# Patient Record
Sex: FEMALE | Race: WHITE | NOT HISPANIC OR LATINO | Employment: OTHER | RURAL
[De-identification: names, ages, dates, MRNs, and addresses within clinical notes are randomized per-mention and may not be internally consistent; named-entity substitution may affect disease eponyms.]

---

## 2017-01-20 ENCOUNTER — HISTORICAL (OUTPATIENT)
Dept: ADMINISTRATIVE | Facility: HOSPITAL | Age: 77
End: 2017-01-20

## 2017-01-23 LAB
LAB AP CLINICAL INFORMATION: NORMAL
LAB AP COMMENTS: NORMAL
LAB AP DIAGNOSIS - HISTORICAL: NORMAL
LAB AP GROSS PATHOLOGY - HISTORICAL: NORMAL
LAB AP SPECIMEN SUBMITTED - HISTORICAL: NORMAL

## 2018-04-06 ENCOUNTER — HISTORICAL (OUTPATIENT)
Dept: ADMINISTRATIVE | Facility: HOSPITAL | Age: 78
End: 2018-04-06

## 2018-04-10 LAB
LAB AP CLINICAL INFORMATION: NORMAL
LAB AP DIAGNOSIS - HISTORICAL: NORMAL
LAB AP GROSS PATHOLOGY - HISTORICAL: NORMAL
LAB AP SPECIMEN SUBMITTED - HISTORICAL: NORMAL

## 2019-03-25 ENCOUNTER — HISTORICAL (OUTPATIENT)
Dept: ADMINISTRATIVE | Facility: HOSPITAL | Age: 79
End: 2019-03-25

## 2019-03-27 LAB
LAB AP COMMENTS: NORMAL
LAB AP DIAGNOSIS - HISTORICAL: NORMAL
LAB AP GROSS PATHOLOGY - HISTORICAL: NORMAL
LAB AP SPECIMEN SUBMITTED - HISTORICAL: NORMAL

## 2019-04-03 ENCOUNTER — HISTORICAL (OUTPATIENT)
Dept: ADMINISTRATIVE | Facility: HOSPITAL | Age: 79
End: 2019-04-03

## 2019-04-09 LAB
LAB AP CAP CHECKLIST - HISTORICAL: NORMAL
LAB AP CLINICAL INFORMATION: NORMAL
LAB AP COMMENTS: NORMAL
LAB AP DIAGNOSIS - HISTORICAL: NORMAL
LAB AP GROSS PATHOLOGY - HISTORICAL: NORMAL
LAB AP SPECIMEN SUBMITTED - HISTORICAL: NORMAL

## 2019-07-16 ENCOUNTER — HISTORICAL (OUTPATIENT)
Dept: ADMINISTRATIVE | Facility: HOSPITAL | Age: 79
End: 2019-07-16

## 2020-03-23 ENCOUNTER — HISTORICAL (OUTPATIENT)
Dept: ADMINISTRATIVE | Facility: HOSPITAL | Age: 80
End: 2020-03-23

## 2020-03-25 LAB

## 2020-07-29 ENCOUNTER — HISTORICAL (OUTPATIENT)
Dept: ADMINISTRATIVE | Facility: HOSPITAL | Age: 80
End: 2020-07-29

## 2020-07-29 LAB
ALBUMIN SERPL BCP-MCNC: 3.8 G/DL (ref 3.5–5)
ALBUMIN/GLOB SERPL: 1 {RATIO}
ALP SERPL-CCNC: 104 U/L (ref 55–142)
ALT SERPL W P-5'-P-CCNC: 46 U/L (ref 13–56)
ANION GAP SERPL CALCULATED.3IONS-SCNC: 12 MMOL/L
APTT PPP: 29.8 SECONDS (ref 25.2–37.3)
AST SERPL W P-5'-P-CCNC: 55 U/L (ref 15–37)
BASOPHILS # BLD AUTO: 0.06 X10E3/UL (ref 0–0.2)
BASOPHILS NFR BLD AUTO: 0.5 % (ref 0–1)
BILIRUB SERPL-MCNC: 0.4 MG/DL (ref 0–1.2)
BILIRUB UR QL STRIP: NEGATIVE MG/DL
BUN SERPL-MCNC: 36 MG/DL (ref 7–18)
BUN/CREAT SERPL: 21.8
CALCIUM SERPL-MCNC: 8.9 MG/DL (ref 8.5–10.1)
CHLORIDE SERPL-SCNC: 96 MMOL/L (ref 98–107)
CK MB SERPL-MCNC: 22.6 NG/ML (ref 1–3.6)
CK SERPL-CCNC: 1158 U/L (ref 26–192)
CLARITY UR: CLEAR
CO2 SERPL-SCNC: 25 MMOL/L (ref 21–32)
COLOR UR: ABNORMAL
CREAT SERPL-MCNC: 1.65 MG/DL (ref 0.55–1.02)
EOSINOPHIL # BLD AUTO: 0.1 X10E3/UL (ref 0–0.5)
EOSINOPHIL NFR BLD AUTO: 0.8 % (ref 1–4)
ERYTHROCYTE [DISTWIDTH] IN BLOOD BY AUTOMATED COUNT: 13.3 % (ref 11.5–14.5)
GLOBULIN SER-MCNC: 3.9 G/DL (ref 2–4)
GLUCOSE SERPL-MCNC: 218 MG/DL (ref 74–106)
GLUCOSE UR STRIP-MCNC: NEGATIVE MG/DL
HCT VFR BLD AUTO: 38.7 % (ref 38–47)
HGB BLD-MCNC: 12.7 G/DL (ref 12–16)
IMM GRANULOCYTES # BLD AUTO: 0.06 X10E3/UL (ref 0–0.04)
IMM GRANULOCYTES NFR BLD: 0.5 % (ref 0–0.4)
INR BLD: 0.99 (ref 0–3.3)
KETONES UR STRIP-SCNC: NEGATIVE MG/DL
LEUKOCYTE ESTERASE UR QL STRIP: NEGATIVE LEU/UL
LYMPHOCYTES # BLD AUTO: 2.39 X10E3/UL (ref 1–4.8)
LYMPHOCYTES NFR BLD AUTO: 19.6 % (ref 27–41)
MAGNESIUM SERPL-MCNC: 2.2 MG/DL (ref 1.7–2.3)
MCH RBC QN AUTO: 30.1 PG (ref 27–31)
MCHC RBC AUTO-ENTMCNC: 32.8 G/DL (ref 32–36)
MCV RBC AUTO: 91.7 FL (ref 80–96)
MONOCYTES # BLD AUTO: 0.94 X10E3/UL (ref 0–0.8)
MONOCYTES NFR BLD AUTO: 7.7 % (ref 2–6)
MPC BLD CALC-MCNC: 10.2 FL (ref 9.4–12.4)
MYOGLOBIN SERPL-MCNC: 500 NG/ML (ref 13–71)
NEUTROPHILS # BLD AUTO: 8.65 X10E3/UL (ref 1.8–7.7)
NEUTROPHILS NFR BLD AUTO: 70.9 % (ref 53–65)
NITRITE UR QL STRIP: NEGATIVE
NRBC # BLD AUTO: 0 X10E3/UL (ref 0–0)
NRBC, AUTO (.00): 0 /100 (ref 0–0)
PH UR STRIP: 6 PH UNITS (ref 5–8)
PLATELET # BLD AUTO: 288 X10E3/UL (ref 150–400)
POTASSIUM SERPL-SCNC: 4.4 MMOL/L (ref 3.5–5.1)
PROT SERPL-MCNC: 7.7 G/DL (ref 6.4–8.2)
PROT UR QL STRIP: NEGATIVE MG/DL
PROTHROMBIN TIME: 12.6 SECONDS (ref 11.7–14.7)
RBC # BLD AUTO: 4.22 X10E6/UL (ref 4.2–5.4)
RBC # UR STRIP: NEGATIVE ERY/UL
SODIUM SERPL-SCNC: 129 MMOL/L (ref 136–145)
SP GR UR STRIP: 1.01 (ref 1–1.03)
TROPONIN I SERPL-MCNC: <0.017 NG/ML (ref 0–0.06)
UROBILINOGEN UR STRIP-ACNC: 0.2 EU/DL
WBC # BLD AUTO: 12.2 X10E3/UL (ref 4.5–11)

## 2020-08-11 ENCOUNTER — HISTORICAL (OUTPATIENT)
Dept: ADMINISTRATIVE | Facility: HOSPITAL | Age: 80
End: 2020-08-11

## 2020-08-11 LAB
ANION GAP SERPL CALCULATED.3IONS-SCNC: 12 MMOL/L (ref 7–16)
BUN SERPL-MCNC: 26 MG/DL (ref 7–18)
CALCIUM SERPL-MCNC: 9 MG/DL (ref 8.5–10.1)
CHLORIDE SERPL-SCNC: 97 MMOL/L (ref 98–107)
CO2 SERPL-SCNC: 27 MMOL/L (ref 21–32)
CREAT SERPL-MCNC: 1.2 MG/DL (ref 0.5–1.02)
EST. AVERAGE GLUCOSE BLD GHB EST-MCNC: 204 MG/DL
GLUCOSE SERPL-MCNC: 298 MG/DL (ref 74–106)
HBA1C MFR BLD HPLC: 8.7 %
POTASSIUM SERPL-SCNC: 4.3 MMOL/L (ref 3.5–5.1)
SODIUM SERPL-SCNC: 132 MMOL/L (ref 136–145)

## 2020-09-04 ENCOUNTER — HISTORICAL (OUTPATIENT)
Dept: ADMINISTRATIVE | Facility: HOSPITAL | Age: 80
End: 2020-09-04

## 2020-10-07 ENCOUNTER — HISTORICAL (OUTPATIENT)
Dept: ADMINISTRATIVE | Facility: HOSPITAL | Age: 80
End: 2020-10-07

## 2020-10-07 LAB — GLUCOSE SERPL-MCNC: 205 MG/DL (ref 70–105)

## 2020-10-13 ENCOUNTER — HISTORICAL (OUTPATIENT)
Dept: ADMINISTRATIVE | Facility: HOSPITAL | Age: 80
End: 2020-10-13

## 2020-10-13 LAB — SARS-COV+SARS-COV-2 AG RESP QL IA.RAPID: NEGATIVE

## 2020-12-11 ENCOUNTER — HISTORICAL (OUTPATIENT)
Dept: ADMINISTRATIVE | Facility: HOSPITAL | Age: 80
End: 2020-12-11

## 2020-12-14 LAB
INSULIN SERPL-ACNC: NORMAL U[IU]/ML
LAB AP CLINICAL INFORMATION: NORMAL
LAB AP COMMENTS: NORMAL
LAB AP DIAGNOSIS - HISTORICAL: NORMAL
LAB AP GROSS PATHOLOGY - HISTORICAL: NORMAL
LAB AP SPECIMEN SUBMITTED - HISTORICAL: NORMAL

## 2021-01-26 ENCOUNTER — HISTORICAL (OUTPATIENT)
Dept: ADMINISTRATIVE | Facility: HOSPITAL | Age: 81
End: 2021-01-26

## 2021-02-18 ENCOUNTER — HISTORICAL (OUTPATIENT)
Dept: ADMINISTRATIVE | Facility: HOSPITAL | Age: 81
End: 2021-02-18

## 2021-02-19 LAB

## 2021-03-15 ENCOUNTER — OFFICE VISIT (OUTPATIENT)
Dept: FAMILY MEDICINE | Facility: CLINIC | Age: 81
End: 2021-03-15
Payer: COMMERCIAL

## 2021-03-15 DIAGNOSIS — Z79.4 TYPE 2 DIABETES MELLITUS WITH HYPERGLYCEMIA, WITH LONG-TERM CURRENT USE OF INSULIN: ICD-10-CM

## 2021-03-15 DIAGNOSIS — Z79.899 ENCOUNTER FOR LONG-TERM (CURRENT) USE OF OTHER MEDICATIONS: Primary | ICD-10-CM

## 2021-03-15 DIAGNOSIS — E11.65 TYPE 2 DIABETES MELLITUS WITH HYPERGLYCEMIA, WITH LONG-TERM CURRENT USE OF INSULIN: ICD-10-CM

## 2021-03-15 PROCEDURE — 83036 HEMOGLOBIN GLYCOSYLATED A1C: CPT

## 2021-03-16 LAB
ALBUMIN SERPL BCP-MCNC: 3.8 G/DL (ref 3.5–5)
ALBUMIN/GLOB SERPL: 1.1 {RATIO}
ALP SERPL-CCNC: 111 U/L (ref 55–142)
ALT SERPL W P-5'-P-CCNC: 15 U/L (ref 13–56)
ANION GAP SERPL CALCULATED.3IONS-SCNC: 14.9 MMOL/L (ref 7–16)
AST SERPL W P-5'-P-CCNC: 12 U/L (ref 15–37)
BASOPHILS # BLD AUTO: 0.09 X10E3/UL (ref 0–0.2)
BASOPHILS NFR BLD AUTO: 0.9 % (ref 0–1)
BILIRUB SERPL-MCNC: 0.4 MG/DL (ref 0–1.2)
BUN SERPL-MCNC: 17 MG/DL (ref 7–18)
BUN/CREAT SERPL: 15
CALCIUM SERPL-MCNC: 9.3 MG/DL (ref 8.5–10.1)
CHLORIDE SERPL-SCNC: 99 MMOL/L (ref 98–107)
CHOLEST SERPL-MCNC: 170 MG/DL
CO2 SERPL-SCNC: 25 MMOL/L (ref 21–32)
CREAT SERPL-MCNC: 1.15 MG/DL (ref 0.5–1.02)
EOSINOPHIL # BLD AUTO: 0.36 X10E3/UL (ref 0–0.5)
EOSINOPHIL NFR BLD AUTO: 3.4 % (ref 1–4)
ERYTHROCYTE [DISTWIDTH] IN BLOOD BY AUTOMATED COUNT: 12.9 % (ref 11.5–14.5)
EST. AVERAGE GLUCOSE BLD GHB EST-MCNC: 184 MG/DL
GLOBULIN SER-MCNC: 3.6 G/DL (ref 2–4)
GLUCOSE SERPL-MCNC: 215 MG/DL (ref 74–106)
HBA1C MFR BLD HPLC: 8.1 %
HCT VFR BLD AUTO: 42.3 % (ref 38–47)
HDLC SERPL-MCNC: 37 MG/DL
HGB BLD-MCNC: 13.3 G/DL (ref 12–16)
IMM GRANULOCYTES # BLD AUTO: 0.03 X10E3/UL (ref 0–0.04)
IMM GRANULOCYTES NFR BLD: 0.3 % (ref 0–0.4)
LDLC SERPL CALC-MCNC: 69 MG/DL
LYMPHOCYTES # BLD AUTO: 2.69 X10E3/UL (ref 1–4.8)
LYMPHOCYTES NFR BLD AUTO: 25.7 % (ref 27–41)
MCH RBC QN AUTO: 29.6 PG (ref 27–31)
MCHC RBC AUTO-ENTMCNC: 31.4 G/DL (ref 32–36)
MCV RBC AUTO: 94 FL (ref 80–96)
MONOCYTES # BLD AUTO: 0.63 X10E3/UL (ref 0–0.8)
MONOCYTES NFR BLD AUTO: 6 % (ref 2–6)
MPC BLD CALC-MCNC: 11.4 FL (ref 9.4–12.4)
NEUTROPHILS # BLD AUTO: 6.68 X10E3/UL (ref 1.8–7.7)
NEUTROPHILS NFR BLD AUTO: 63.7 % (ref 53–65)
NONHDLC SERPL-MCNC: 133 MG/DL
NRBC # BLD AUTO: 0 X10E3/UL (ref 0–0)
NRBC, AUTO (.00): 0 /100 (ref 0–0)
PLATELET # BLD AUTO: 288 X10E3/UL (ref 150–400)
POTASSIUM SERPL-SCNC: 4.9 MMOL/L (ref 3.5–5.1)
PROT SERPL-MCNC: 7.4 G/DL (ref 6.4–8.2)
RBC # BLD AUTO: 4.5 X10E6/UL (ref 4.2–5.4)
SODIUM SERPL-SCNC: 134 MMOL/L (ref 136–145)
TRIGL SERPL-MCNC: 321 MG/DL
VLDLC SERPL-MCNC: 64 MG/DL
WBC # BLD AUTO: 10.48 X10E3/UL (ref 4.5–11)

## 2021-03-24 ENCOUNTER — TELEPHONE (OUTPATIENT)
Dept: FAMILY MEDICINE | Facility: CLINIC | Age: 81
End: 2021-03-24

## 2021-04-20 ENCOUNTER — TELEPHONE (OUTPATIENT)
Dept: FAMILY MEDICINE | Facility: CLINIC | Age: 81
End: 2021-04-20

## 2021-04-20 RX ORDER — GLIPIZIDE 5 MG/1
5 TABLET ORAL
COMMUNITY
End: 2021-08-03 | Stop reason: ALTCHOICE

## 2021-04-20 RX ORDER — LISINOPRIL 5 MG/1
5 TABLET ORAL DAILY
COMMUNITY
End: 2021-08-03 | Stop reason: SDUPTHER

## 2021-04-20 RX ORDER — GABAPENTIN 100 MG/1
100 CAPSULE ORAL 3 TIMES DAILY
COMMUNITY
End: 2021-07-21 | Stop reason: SDUPTHER

## 2021-04-20 RX ORDER — NITROGLYCERIN 0.4 MG/1
0.4 TABLET SUBLINGUAL EVERY 5 MIN PRN
COMMUNITY
End: 2022-03-31 | Stop reason: SDUPTHER

## 2021-04-20 RX ORDER — ASCORBIC ACID 500 MG
500 TABLET ORAL DAILY
COMMUNITY

## 2021-04-20 RX ORDER — ESOMEPRAZOLE MAGNESIUM 40 MG/1
40 CAPSULE, DELAYED RELEASE ORAL
COMMUNITY
End: 2021-07-13

## 2021-04-20 RX ORDER — MONTELUKAST SODIUM 10 MG/1
10 TABLET ORAL NIGHTLY
COMMUNITY
End: 2021-08-03 | Stop reason: SDUPTHER

## 2021-04-20 RX ORDER — ASPIRIN 81 MG/1
81 TABLET ORAL DAILY
COMMUNITY
End: 2022-08-03

## 2021-04-20 RX ORDER — VIT C/E/ZN/COPPR/LUTEIN/ZEAXAN 250MG-90MG
2000 CAPSULE ORAL 2 TIMES DAILY
COMMUNITY

## 2021-04-20 RX ORDER — ROSUVASTATIN CALCIUM 20 MG/1
20 TABLET, COATED ORAL DAILY
COMMUNITY
End: 2021-08-03 | Stop reason: SDUPTHER

## 2021-04-20 RX ORDER — SPIRONOLACTONE 25 MG/1
25 TABLET ORAL DAILY
COMMUNITY
End: 2021-08-03 | Stop reason: SDUPTHER

## 2021-04-20 RX ORDER — CARVEDILOL 3.12 MG/1
3.12 TABLET ORAL 2 TIMES DAILY WITH MEALS
COMMUNITY
End: 2021-08-03 | Stop reason: SDUPTHER

## 2021-04-20 RX ORDER — ONDANSETRON 4 MG/1
4 TABLET, FILM COATED ORAL EVERY 8 HOURS PRN
COMMUNITY
End: 2021-09-20

## 2021-04-20 RX ORDER — FUROSEMIDE 40 MG/1
40 TABLET ORAL DAILY
COMMUNITY
End: 2021-08-03 | Stop reason: SDUPTHER

## 2021-04-20 RX ORDER — SERTRALINE HYDROCHLORIDE 50 MG/1
50 TABLET, FILM COATED ORAL DAILY
COMMUNITY
End: 2021-06-01

## 2021-04-20 RX ORDER — LEVOTHYROXINE SODIUM 25 UG/1
25 TABLET ORAL
COMMUNITY
End: 2022-06-27 | Stop reason: SDUPTHER

## 2021-04-21 ENCOUNTER — OFFICE VISIT (OUTPATIENT)
Dept: PAIN MEDICINE | Facility: CLINIC | Age: 81
End: 2021-04-21
Attending: PAIN MEDICINE
Payer: COMMERCIAL

## 2021-04-21 VITALS
HEART RATE: 98 BPM | BODY MASS INDEX: 28.53 KG/M2 | SYSTOLIC BLOOD PRESSURE: 146 MMHG | DIASTOLIC BLOOD PRESSURE: 80 MMHG | HEIGHT: 63 IN | WEIGHT: 161 LBS

## 2021-04-21 DIAGNOSIS — M54.12 CERVICAL RADICULOPATHY: ICD-10-CM

## 2021-04-21 DIAGNOSIS — Z79.899 ENCOUNTER FOR LONG-TERM (CURRENT) USE OF OTHER MEDICATIONS: Primary | ICD-10-CM

## 2021-04-21 DIAGNOSIS — M47.812 CERVICAL SPONDYLOSIS: ICD-10-CM

## 2021-04-21 DIAGNOSIS — G89.4 CHRONIC PAIN DISORDER: ICD-10-CM

## 2021-04-21 LAB
CTP QC/QA: YES
POC (AMP) AMPHETAMINE: NEGATIVE
POC (BAR) BARBITURATES: NEGATIVE
POC (BUP) BUPRENORPHINE: NEGATIVE
POC (BZO) BENZODIAZEPINES: ABNORMAL
POC (COC) COCAINE: NEGATIVE
POC (MDMA) METHYLENEDIOXYMETHAMPHETAMINE 3,4: NEGATIVE
POC (MET) METHAMPHETAMINE: NEGATIVE
POC (MOP) OPIATES: ABNORMAL
POC (MTD) METHADONE: NEGATIVE
POC (OXY) OXYCODONE: NEGATIVE
POC (PCP) PHENCYCLIDINE: NEGATIVE
POC (TCA) TRICYCLIC ANTIDEPRESSANTS: NEGATIVE
POC TEMPERATURE (URINE): 94
POC THC: NEGATIVE

## 2021-04-21 PROCEDURE — G0481 DRUG SCREEN DEFINITIVE 14, URINE: ICD-10-PCS | Mod: ,,, | Performed by: CLINICAL MEDICAL LABORATORY

## 2021-04-21 PROCEDURE — 99213 OFFICE O/P EST LOW 20 MIN: CPT | Mod: S$PBB,,, | Performed by: PAIN MEDICINE

## 2021-04-21 PROCEDURE — 99215 OFFICE O/P EST HI 40 MIN: CPT | Mod: PBBFAC | Performed by: PAIN MEDICINE

## 2021-04-21 PROCEDURE — 99213 PR OFFICE/OUTPT VISIT, EST, LEVL III, 20-29 MIN: ICD-10-PCS | Mod: S$PBB,,, | Performed by: PAIN MEDICINE

## 2021-04-21 PROCEDURE — 99999 PR PBB SHADOW E&M-EST. PATIENT-LVL V: ICD-10-PCS | Mod: PBBFAC,,, | Performed by: PAIN MEDICINE

## 2021-04-21 PROCEDURE — 99999 PR PBB SHADOW E&M-EST. PATIENT-LVL V: CPT | Mod: PBBFAC,,, | Performed by: PAIN MEDICINE

## 2021-04-21 PROCEDURE — 80305 DRUG TEST PRSMV DIR OPT OBS: CPT | Mod: PBBFAC | Performed by: PAIN MEDICINE

## 2021-04-21 PROCEDURE — G0481 DRUG TEST DEF 8-14 CLASSES: HCPCS | Mod: ,,, | Performed by: CLINICAL MEDICAL LABORATORY

## 2021-04-21 RX ORDER — HYDROCODONE BITARTRATE AND ACETAMINOPHEN 5; 325 MG/1; MG/1
1 TABLET ORAL 3 TIMES DAILY
COMMUNITY
End: 2021-12-15

## 2021-04-21 RX ORDER — HYDROCODONE BITARTRATE AND ACETAMINOPHEN 5; 325 MG/1; MG/1
1 TABLET ORAL EVERY 8 HOURS PRN
Qty: 90 TABLET | Refills: 0 | Status: SHIPPED | OUTPATIENT
Start: 2021-04-23 | End: 2021-05-23

## 2021-04-21 RX ORDER — HYDROCODONE BITARTRATE AND ACETAMINOPHEN 5; 325 MG/1; MG/1
1 TABLET ORAL EVERY 8 HOURS PRN
Qty: 90 TABLET | Refills: 0 | Status: SHIPPED | OUTPATIENT
Start: 2021-05-23 | End: 2021-06-22

## 2021-04-21 RX ORDER — HYDROCODONE BITARTRATE AND ACETAMINOPHEN 5; 325 MG/1; MG/1
1 TABLET ORAL EVERY 8 HOURS PRN
Qty: 90 TABLET | Refills: 0 | Status: SHIPPED | OUTPATIENT
Start: 2021-06-22 | End: 2021-07-22

## 2021-04-23 LAB
6-ACETYLMORPHINE, URINE (RUSH): NEGATIVE 10 NG/ML
7-AMINOCLONAZEPAM, URINE (RUSH): NEGATIVE 25 NG/ML
A-HYDROXYALPRAZOLAM, URINE (RUSH): NEGATIVE 25 NG/ML
ACETYL FENTANYL, URINE (RUSH): NEGATIVE 2.5 NG/ML
ACETYL NORFENTANYL OXALATE, URINE (RUSH): NEGATIVE 5 NG/ML
AMPHET UR QL SCN: NEGATIVE
BENZOYLECGONINE, URINE (RUSH): NEGATIVE 100 NG/ML
BUPRENORPHINE UR QL SCN: NEGATIVE 25 NG/ML
CODEINE, URINE (RUSH): 302.2 25 NG/ML
CREAT UR-MCNC: 78 MG/DL (ref 28–219)
EDDP, URINE (RUSH): NEGATIVE 25 NG/ML
FENTANYL, URINE (RUSH): NEGATIVE 2.5 NG/ML
HYDROCODONE, URINE (RUSH): >250 25 NG/ML
HYDROMORPHONE, URINE (RUSH): >250 25 NG/ML
LORAZEPAM, URINE (RUSH): NEGATIVE 25 NG/ML
METHADONE UR QL SCN: NEGATIVE
METHAMPHET UR QL SCN: NEGATIVE
MORPHINE, URINE (RUSH): 30.2 25 NG/ML
NORBUPRENORPHINE, URINE (RUSH): NEGATIVE 25 NG/ML
NORDIAZEPAM, URINE (RUSH): NEGATIVE 25 NG/ML
NORFENTANYL OXALATE, URINE (RUSH): NEGATIVE 5 NG/ML
NORHYDROCODONE, URINE (RUSH): >500 50 NG/ML
NOROXYCODONE HCL, URINE (RUSH): NEGATIVE 50 NG/ML
OXAZEPAM, URINE (RUSH): NEGATIVE 25 NG/ML
OXYCODONE UR QL SCN: NEGATIVE
OXYMORPHONE, URINE (RUSH): NEGATIVE 25 NG/ML
PH UR STRIP: 6 PH UNITS
SP GR UR STRIP: 1.01
TAPENTADOL, URINE (RUSH): NEGATIVE 25 NG/ML
TEMAZEPAM, URINE (RUSH): NEGATIVE 25 NG/ML
THC-COOH, URINE (RUSH): NEGATIVE 25 NG/ML
TRAMADOL, URINE (RUSH): NEGATIVE 100 NG/ML

## 2021-05-20 ENCOUNTER — TELEPHONE (OUTPATIENT)
Dept: FAMILY MEDICINE | Facility: CLINIC | Age: 81
End: 2021-05-20

## 2021-07-13 RX ORDER — ESOMEPRAZOLE MAGNESIUM 40 MG/1
40 CAPSULE, DELAYED RELEASE ORAL
OUTPATIENT
Start: 2021-07-13

## 2021-07-15 RX ORDER — METHOCARBAMOL 500 MG/1
500 TABLET, FILM COATED ORAL 2 TIMES DAILY
Qty: 40 TABLET | Refills: 0 | Status: SHIPPED | OUTPATIENT
Start: 2021-07-15 | End: 2021-08-03 | Stop reason: SDUPTHER

## 2021-07-21 ENCOUNTER — OFFICE VISIT (OUTPATIENT)
Dept: PAIN MEDICINE | Facility: CLINIC | Age: 81
End: 2021-07-21
Payer: COMMERCIAL

## 2021-07-21 VITALS
DIASTOLIC BLOOD PRESSURE: 84 MMHG | BODY MASS INDEX: 28.53 KG/M2 | WEIGHT: 161 LBS | SYSTOLIC BLOOD PRESSURE: 137 MMHG | HEART RATE: 103 BPM | HEIGHT: 63 IN

## 2021-07-21 DIAGNOSIS — G89.4 CHRONIC PAIN DISORDER: Chronic | ICD-10-CM

## 2021-07-21 DIAGNOSIS — M47.812 CERVICAL SPONDYLOSIS: Primary | Chronic | ICD-10-CM

## 2021-07-21 DIAGNOSIS — Z79.899 OTHER LONG TERM (CURRENT) DRUG THERAPY: ICD-10-CM

## 2021-07-21 LAB
CTP QC/QA: YES
POC (AMP) AMPHETAMINE: NEGATIVE
POC (BAR) BARBITURATES: NEGATIVE
POC (BUP) BUPRENORPHINE: NEGATIVE
POC (BZO) BENZODIAZEPINES: NEGATIVE
POC (COC) COCAINE: NEGATIVE
POC (MDMA) METHYLENEDIOXYMETHAMPHETAMINE 3,4: NEGATIVE
POC (MET) METHAMPHETAMINE: NEGATIVE
POC (MOP) OPIATES: ABNORMAL
POC (MTD) METHADONE: NEGATIVE
POC (OXY) OXYCODONE: NEGATIVE
POC (PCP) PHENCYCLIDINE: NEGATIVE
POC (TCA) TRICYCLIC ANTIDEPRESSANTS: NEGATIVE
POC TEMPERATURE (URINE): 94
POC THC: NEGATIVE

## 2021-07-21 PROCEDURE — 99214 OFFICE O/P EST MOD 30 MIN: CPT | Mod: S$PBB,,, | Performed by: PAIN MEDICINE

## 2021-07-21 PROCEDURE — 99215 OFFICE O/P EST HI 40 MIN: CPT | Mod: PBBFAC | Performed by: PAIN MEDICINE

## 2021-07-21 PROCEDURE — 99214 PR OFFICE/OUTPT VISIT, EST, LEVL IV, 30-39 MIN: ICD-10-PCS | Mod: S$PBB,,, | Performed by: PAIN MEDICINE

## 2021-07-21 PROCEDURE — 80305 DRUG TEST PRSMV DIR OPT OBS: CPT | Mod: PBBFAC | Performed by: PAIN MEDICINE

## 2021-07-21 RX ORDER — HYDROCODONE BITARTRATE AND ACETAMINOPHEN 5; 325 MG/1; MG/1
1 TABLET ORAL EVERY 8 HOURS PRN
Qty: 90 TABLET | Refills: 0 | Status: SHIPPED | OUTPATIENT
Start: 2021-09-20 | End: 2021-10-20

## 2021-07-21 RX ORDER — HYDROCODONE BITARTRATE AND ACETAMINOPHEN 5; 325 MG/1; MG/1
1 TABLET ORAL EVERY 8 HOURS PRN
Qty: 90 TABLET | Refills: 0 | Status: SHIPPED | OUTPATIENT
Start: 2021-08-21 | End: 2021-09-20

## 2021-07-21 RX ORDER — GABAPENTIN 100 MG/1
100 CAPSULE ORAL 3 TIMES DAILY
Qty: 270 CAPSULE | Refills: 0 | Status: SHIPPED | OUTPATIENT
Start: 2021-07-21 | End: 2021-10-19

## 2021-07-21 RX ORDER — HYDROCODONE BITARTRATE AND ACETAMINOPHEN 5; 325 MG/1; MG/1
1 TABLET ORAL EVERY 8 HOURS PRN
Qty: 90 TABLET | Refills: 0 | Status: SHIPPED | OUTPATIENT
Start: 2021-07-22 | End: 2021-08-21

## 2021-08-03 ENCOUNTER — OFFICE VISIT (OUTPATIENT)
Dept: FAMILY MEDICINE | Facility: CLINIC | Age: 81
End: 2021-08-03
Payer: COMMERCIAL

## 2021-08-03 ENCOUNTER — DOCUMENTATION ONLY (OUTPATIENT)
Dept: PAIN MEDICINE | Facility: CLINIC | Age: 81
End: 2021-08-03

## 2021-08-03 VITALS
HEIGHT: 63 IN | TEMPERATURE: 98 F | HEART RATE: 80 BPM | RESPIRATION RATE: 20 BRPM | BODY MASS INDEX: 28.35 KG/M2 | DIASTOLIC BLOOD PRESSURE: 66 MMHG | OXYGEN SATURATION: 96 % | SYSTOLIC BLOOD PRESSURE: 106 MMHG | WEIGHT: 160 LBS

## 2021-08-03 DIAGNOSIS — E03.9 HYPOTHYROIDISM, UNSPECIFIED TYPE: ICD-10-CM

## 2021-08-03 DIAGNOSIS — F32.A DEPRESSION, UNSPECIFIED DEPRESSION TYPE: ICD-10-CM

## 2021-08-03 DIAGNOSIS — E11.9 CONTROLLED TYPE 2 DIABETES MELLITUS WITHOUT COMPLICATION, WITHOUT LONG-TERM CURRENT USE OF INSULIN: Primary | ICD-10-CM

## 2021-08-03 DIAGNOSIS — I25.10 CORONARY ARTERY DISEASE, ANGINA PRESENCE UNSPECIFIED, UNSPECIFIED VESSEL OR LESION TYPE, UNSPECIFIED WHETHER NATIVE OR TRANSPLANTED HEART: ICD-10-CM

## 2021-08-03 DIAGNOSIS — J44.9 CHRONIC OBSTRUCTIVE PULMONARY DISEASE, UNSPECIFIED COPD TYPE: ICD-10-CM

## 2021-08-03 DIAGNOSIS — E78.5 DYSLIPIDEMIA: ICD-10-CM

## 2021-08-03 LAB
ALBUMIN SERPL BCP-MCNC: 3.8 G/DL (ref 3.5–5)
ALBUMIN/GLOB SERPL: 1 {RATIO}
ALP SERPL-CCNC: 99 U/L (ref 55–142)
ALT SERPL W P-5'-P-CCNC: 15 U/L (ref 13–56)
ANION GAP SERPL CALCULATED.3IONS-SCNC: 8 MMOL/L (ref 7–16)
AST SERPL W P-5'-P-CCNC: 10 U/L (ref 15–37)
BASOPHILS # BLD AUTO: 0.09 K/UL (ref 0–0.2)
BASOPHILS NFR BLD AUTO: 1 % (ref 0–1)
BILIRUB SERPL-MCNC: 0.4 MG/DL (ref 0–1.2)
BUN SERPL-MCNC: 18 MG/DL (ref 7–18)
BUN/CREAT SERPL: 16 (ref 6–20)
CALCIUM SERPL-MCNC: 9.5 MG/DL (ref 8.5–10.1)
CHLORIDE SERPL-SCNC: 102 MMOL/L (ref 98–107)
CHOLEST SERPL-MCNC: 168 MG/DL (ref 0–200)
CHOLEST/HDLC SERPL: 4.7 {RATIO}
CO2 SERPL-SCNC: 28 MMOL/L (ref 21–32)
CREAT SERPL-MCNC: 1.14 MG/DL (ref 0.55–1.02)
DIFFERENTIAL METHOD BLD: ABNORMAL
EOSINOPHIL # BLD AUTO: 0.44 K/UL (ref 0–0.5)
EOSINOPHIL NFR BLD AUTO: 5 % (ref 1–4)
ERYTHROCYTE [DISTWIDTH] IN BLOOD BY AUTOMATED COUNT: 13 % (ref 11.5–14.5)
EST. AVERAGE GLUCOSE BLD GHB EST-MCNC: 174 MG/DL
GLOBULIN SER-MCNC: 3.9 G/DL (ref 2–4)
GLUCOSE SERPL-MCNC: 152 MG/DL (ref 74–106)
HBA1C MFR BLD HPLC: 7.8 % (ref 4.5–6.6)
HCT VFR BLD AUTO: 39.2 % (ref 38–47)
HDLC SERPL-MCNC: 36 MG/DL (ref 40–60)
HGB BLD-MCNC: 12.9 G/DL (ref 12–16)
IMM GRANULOCYTES # BLD AUTO: 0.03 K/UL (ref 0–0.04)
IMM GRANULOCYTES NFR BLD: 0.3 % (ref 0–0.4)
LDLC SERPL CALC-MCNC: 74 MG/DL
LDLC/HDLC SERPL: 2.1 {RATIO}
LYMPHOCYTES # BLD AUTO: 2.25 K/UL (ref 1–4.8)
LYMPHOCYTES NFR BLD AUTO: 25.3 % (ref 27–41)
MCH RBC QN AUTO: 30.1 PG (ref 27–31)
MCHC RBC AUTO-ENTMCNC: 32.9 G/DL (ref 32–36)
MCV RBC AUTO: 91.6 FL (ref 80–96)
MONOCYTES # BLD AUTO: 0.55 K/UL (ref 0–0.8)
MONOCYTES NFR BLD AUTO: 6.2 % (ref 2–6)
MPC BLD CALC-MCNC: 11.1 FL (ref 9.4–12.4)
NEUTROPHILS # BLD AUTO: 5.52 K/UL (ref 1.8–7.7)
NEUTROPHILS NFR BLD AUTO: 62.2 % (ref 53–65)
NONHDLC SERPL-MCNC: 132 MG/DL
NRBC # BLD AUTO: 0 X10E3/UL
NRBC, AUTO (.00): 0 %
PLATELET # BLD AUTO: 259 K/UL (ref 150–400)
POTASSIUM SERPL-SCNC: 4 MMOL/L (ref 3.5–5.1)
PROT SERPL-MCNC: 7.7 G/DL (ref 6.4–8.2)
RBC # BLD AUTO: 4.28 M/UL (ref 4.2–5.4)
SODIUM SERPL-SCNC: 134 MMOL/L (ref 136–145)
TRIGL SERPL-MCNC: 291 MG/DL (ref 35–150)
TSH SERPL DL<=0.005 MIU/L-ACNC: 3.13 UIU/ML (ref 0.36–3.74)
VLDLC SERPL-MCNC: 58 MG/DL
WBC # BLD AUTO: 8.88 K/UL (ref 4.5–11)

## 2021-08-03 PROCEDURE — 80061 LIPID PANEL: CPT | Mod: QW,,, | Performed by: CLINICAL MEDICAL LABORATORY

## 2021-08-03 PROCEDURE — 80061 LIPID PANEL: ICD-10-PCS | Mod: QW,,, | Performed by: CLINICAL MEDICAL LABORATORY

## 2021-08-03 PROCEDURE — 85025 CBC WITH DIFFERENTIAL: ICD-10-PCS | Mod: QW,,, | Performed by: CLINICAL MEDICAL LABORATORY

## 2021-08-03 PROCEDURE — 84443 ASSAY THYROID STIM HORMONE: CPT | Mod: QW,,, | Performed by: CLINICAL MEDICAL LABORATORY

## 2021-08-03 PROCEDURE — 99214 OFFICE O/P EST MOD 30 MIN: CPT | Mod: ,,, | Performed by: NURSE PRACTITIONER

## 2021-08-03 PROCEDURE — 80053 COMPREHENSIVE METABOLIC PANEL: ICD-10-PCS | Mod: QW,,, | Performed by: CLINICAL MEDICAL LABORATORY

## 2021-08-03 PROCEDURE — 80053 COMPREHEN METABOLIC PANEL: CPT | Mod: QW,,, | Performed by: CLINICAL MEDICAL LABORATORY

## 2021-08-03 PROCEDURE — 83036 HEMOGLOBIN GLYCOSYLATED A1C: CPT | Mod: QW,,, | Performed by: CLINICAL MEDICAL LABORATORY

## 2021-08-03 PROCEDURE — 84443 TSH: ICD-10-PCS | Mod: QW,,, | Performed by: CLINICAL MEDICAL LABORATORY

## 2021-08-03 PROCEDURE — 99214 PR OFFICE/OUTPT VISIT, EST, LEVL IV, 30-39 MIN: ICD-10-PCS | Mod: ,,, | Performed by: NURSE PRACTITIONER

## 2021-08-03 PROCEDURE — 83036 HEMOGLOBIN A1C: ICD-10-PCS | Mod: QW,,, | Performed by: CLINICAL MEDICAL LABORATORY

## 2021-08-03 PROCEDURE — 85025 COMPLETE CBC W/AUTO DIFF WBC: CPT | Mod: QW,,, | Performed by: CLINICAL MEDICAL LABORATORY

## 2021-08-03 RX ORDER — FUROSEMIDE 40 MG/1
40 TABLET ORAL DAILY
Qty: 90 TABLET | Refills: 1 | Status: SHIPPED | OUTPATIENT
Start: 2021-08-03 | End: 2021-09-20 | Stop reason: SDUPTHER

## 2021-08-03 RX ORDER — AMITRIPTYLINE HYDROCHLORIDE 25 MG/1
25 TABLET, FILM COATED ORAL NIGHTLY
Qty: 90 TABLET | Refills: 1 | Status: SHIPPED | OUTPATIENT
Start: 2021-08-03 | End: 2021-08-03

## 2021-08-03 RX ORDER — MONTELUKAST SODIUM 10 MG/1
10 TABLET ORAL NIGHTLY
Qty: 90 TABLET | Refills: 1 | Status: SHIPPED | OUTPATIENT
Start: 2021-08-03 | End: 2021-09-20 | Stop reason: SDUPTHER

## 2021-08-03 RX ORDER — METHOCARBAMOL 500 MG/1
500 TABLET, FILM COATED ORAL 2 TIMES DAILY
Qty: 40 TABLET | Refills: 2 | Status: SHIPPED | OUTPATIENT
Start: 2021-08-03 | End: 2021-09-20 | Stop reason: SDUPTHER

## 2021-08-03 RX ORDER — ROSUVASTATIN CALCIUM 20 MG/1
20 TABLET, COATED ORAL DAILY
Qty: 90 TABLET | Refills: 1 | Status: SHIPPED | OUTPATIENT
Start: 2021-08-03 | End: 2021-09-20 | Stop reason: SDUPTHER

## 2021-08-03 RX ORDER — CARVEDILOL 3.12 MG/1
3.12 TABLET ORAL 2 TIMES DAILY WITH MEALS
Qty: 90 TABLET | Refills: 1 | Status: SHIPPED | OUTPATIENT
Start: 2021-08-03 | End: 2021-09-20 | Stop reason: SDUPTHER

## 2021-08-03 RX ORDER — SPIRONOLACTONE 25 MG/1
25 TABLET ORAL DAILY
Qty: 90 TABLET | Refills: 1 | Status: SHIPPED | OUTPATIENT
Start: 2021-08-03 | End: 2021-09-20 | Stop reason: SDUPTHER

## 2021-08-03 RX ORDER — LISINOPRIL 5 MG/1
5 TABLET ORAL DAILY
Qty: 90 TABLET | Refills: 1 | Status: SHIPPED | OUTPATIENT
Start: 2021-08-03 | End: 2021-09-20 | Stop reason: SDUPTHER

## 2021-08-03 RX ORDER — SERTRALINE HYDROCHLORIDE 50 MG/1
50 TABLET, FILM COATED ORAL DAILY
Qty: 90 TABLET | Refills: 1 | Status: SHIPPED | OUTPATIENT
Start: 2021-08-03 | End: 2021-09-20 | Stop reason: SDUPTHER

## 2021-08-05 ENCOUNTER — HOSPITAL ENCOUNTER (OUTPATIENT)
Facility: HOSPITAL | Age: 81
Discharge: HOME | End: 2021-08-05
Attending: PAIN MEDICINE | Admitting: PAIN MEDICINE
Payer: COMMERCIAL

## 2021-08-05 ENCOUNTER — ANESTHESIA (OUTPATIENT)
Dept: PAIN MEDICINE | Facility: HOSPITAL | Age: 81
End: 2021-08-05
Payer: COMMERCIAL

## 2021-08-05 ENCOUNTER — ANESTHESIA EVENT (OUTPATIENT)
Dept: PAIN MEDICINE | Facility: HOSPITAL | Age: 81
End: 2021-08-05
Payer: COMMERCIAL

## 2021-08-05 VITALS
TEMPERATURE: 97 F | WEIGHT: 162.81 LBS | HEART RATE: 66 BPM | OXYGEN SATURATION: 98 % | SYSTOLIC BLOOD PRESSURE: 103 MMHG | RESPIRATION RATE: 15 BRPM | DIASTOLIC BLOOD PRESSURE: 56 MMHG | HEIGHT: 63 IN | BODY MASS INDEX: 28.85 KG/M2

## 2021-08-05 DIAGNOSIS — M47.812 SPONDYLOSIS OF CERVICAL REGION WITHOUT MYELOPATHY OR RADICULOPATHY: Primary | ICD-10-CM

## 2021-08-05 DIAGNOSIS — M47.812 SPONDYLOSIS OF CERVICAL JOINT WITHOUT MYELOPATHY: ICD-10-CM

## 2021-08-05 LAB — GLUCOSE SERPL-MCNC: 148 MG/DL (ref 70–110)

## 2021-08-05 PROCEDURE — 64634 DESTROY C/TH FACET JNT ADDL: CPT | Mod: 59,GZ | Performed by: NURSE ANESTHETIST, CERTIFIED REGISTERED

## 2021-08-05 PROCEDURE — 27201423 OPTIME MED/SURG SUP & DEVICES STERILE SUPPLY: Performed by: PAIN MEDICINE

## 2021-08-05 PROCEDURE — 25000003 PHARM REV CODE 250: Performed by: NURSE ANESTHETIST, CERTIFIED REGISTERED

## 2021-08-05 PROCEDURE — 63600175 PHARM REV CODE 636 W HCPCS: Performed by: PAIN MEDICINE

## 2021-08-05 PROCEDURE — 37000009 HC ANESTHESIA EA ADD 15 MINS: Performed by: PAIN MEDICINE

## 2021-08-05 PROCEDURE — D9220A PRA ANESTHESIA: Mod: ,,, | Performed by: NURSE ANESTHETIST, CERTIFIED REGISTERED

## 2021-08-05 PROCEDURE — 64633 DESTROY CERV/THOR FACET JNT: CPT | Mod: LT,,, | Performed by: PAIN MEDICINE

## 2021-08-05 PROCEDURE — 82962 GLUCOSE BLOOD TEST: CPT

## 2021-08-05 PROCEDURE — 63600175 PHARM REV CODE 636 W HCPCS: Performed by: NURSE ANESTHETIST, CERTIFIED REGISTERED

## 2021-08-05 PROCEDURE — 64634 DESTROY C/TH FACET JNT ADDL: CPT | Performed by: PAIN MEDICINE

## 2021-08-05 PROCEDURE — 64633 PR DESTROY CERV/THOR FACET JNT: ICD-10-PCS | Mod: LT,,, | Performed by: PAIN MEDICINE

## 2021-08-05 PROCEDURE — 25000003 PHARM REV CODE 250: Performed by: PAIN MEDICINE

## 2021-08-05 PROCEDURE — 64633 DESTROY CERV/THOR FACET JNT: CPT | Performed by: PAIN MEDICINE

## 2021-08-05 PROCEDURE — 64634 PR DESTROY C/TH FACET JNT ADDL: ICD-10-PCS | Mod: 59,GZ,LT, | Performed by: PAIN MEDICINE

## 2021-08-05 PROCEDURE — 37000008 HC ANESTHESIA 1ST 15 MINUTES: Performed by: PAIN MEDICINE

## 2021-08-05 PROCEDURE — D9220A PRA ANESTHESIA: ICD-10-PCS | Mod: ,,, | Performed by: NURSE ANESTHETIST, CERTIFIED REGISTERED

## 2021-08-05 PROCEDURE — 64634 DESTROY C/TH FACET JNT ADDL: CPT | Mod: LT,,, | Performed by: PAIN MEDICINE

## 2021-08-05 RX ORDER — LIDOCAINE HYDROCHLORIDE 20 MG/ML
INJECTION, SOLUTION EPIDURAL; INFILTRATION; INTRACAUDAL; PERINEURAL
Status: DISCONTINUED | OUTPATIENT
Start: 2021-08-05 | End: 2021-08-05

## 2021-08-05 RX ORDER — TRIAMCINOLONE ACETONIDE 40 MG/ML
INJECTION, SUSPENSION INTRA-ARTICULAR; INTRAMUSCULAR
Status: DISCONTINUED | OUTPATIENT
Start: 2021-08-05 | End: 2021-08-05 | Stop reason: HOSPADM

## 2021-08-05 RX ORDER — PROPOFOL 10 MG/ML
VIAL (ML) INTRAVENOUS
Status: DISCONTINUED | OUTPATIENT
Start: 2021-08-05 | End: 2021-08-05

## 2021-08-05 RX ORDER — BUPIVACAINE HYDROCHLORIDE 2.5 MG/ML
INJECTION, SOLUTION EPIDURAL; INFILTRATION; INTRACAUDAL
Status: DISCONTINUED | OUTPATIENT
Start: 2021-08-05 | End: 2021-08-05 | Stop reason: HOSPADM

## 2021-08-05 RX ORDER — SODIUM CHLORIDE 9 MG/ML
INJECTION, SOLUTION INTRAVENOUS CONTINUOUS
Status: DISCONTINUED | OUTPATIENT
Start: 2021-08-05 | End: 2021-08-05 | Stop reason: HOSPADM

## 2021-08-05 RX ADMIN — SODIUM CHLORIDE: 9 INJECTION, SOLUTION INTRAVENOUS at 08:08

## 2021-08-05 RX ADMIN — PROPOFOL 25 MG: 10 INJECTION, EMULSION INTRAVENOUS at 09:08

## 2021-08-05 RX ADMIN — PROPOFOL 50 MG: 10 INJECTION, EMULSION INTRAVENOUS at 09:08

## 2021-08-05 RX ADMIN — LIDOCAINE HYDROCHLORIDE 100 MG: 20 INJECTION, SOLUTION INTRAVENOUS at 09:08

## 2021-08-05 RX ADMIN — SODIUM CHLORIDE: 9 INJECTION, SOLUTION INTRAVENOUS at 09:08

## 2021-08-06 ENCOUNTER — TELEPHONE (OUTPATIENT)
Dept: FAMILY MEDICINE | Facility: CLINIC | Age: 81
End: 2021-08-06

## 2021-08-24 ENCOUNTER — HOSPITAL ENCOUNTER (OUTPATIENT)
Facility: HOSPITAL | Age: 81
Discharge: HOME | End: 2021-08-24
Attending: PAIN MEDICINE | Admitting: PAIN MEDICINE
Payer: COMMERCIAL

## 2021-08-24 ENCOUNTER — ANESTHESIA EVENT (OUTPATIENT)
Dept: PAIN MEDICINE | Facility: HOSPITAL | Age: 81
End: 2021-08-24
Payer: COMMERCIAL

## 2021-08-24 ENCOUNTER — ANESTHESIA (OUTPATIENT)
Dept: PAIN MEDICINE | Facility: HOSPITAL | Age: 81
End: 2021-08-24
Payer: COMMERCIAL

## 2021-08-24 VITALS
SYSTOLIC BLOOD PRESSURE: 96 MMHG | BODY MASS INDEX: 27.57 KG/M2 | TEMPERATURE: 99 F | RESPIRATION RATE: 8 BRPM | OXYGEN SATURATION: 97 % | DIASTOLIC BLOOD PRESSURE: 52 MMHG | HEART RATE: 77 BPM | HEIGHT: 63 IN | WEIGHT: 155.63 LBS

## 2021-08-24 DIAGNOSIS — M47.812 SPONDYLOSIS OF CERVICAL REGION WITHOUT MYELOPATHY OR RADICULOPATHY: ICD-10-CM

## 2021-08-24 LAB
GLUCOSE SERPL-MCNC: 216 MG/DL (ref 70–105)
GLUCOSE SERPL-MCNC: 216 MG/DL (ref 70–110)

## 2021-08-24 PROCEDURE — 25000003 PHARM REV CODE 250: Performed by: NURSE ANESTHETIST, CERTIFIED REGISTERED

## 2021-08-24 PROCEDURE — 64633 PR DESTROY CERV/THOR FACET JNT: ICD-10-PCS | Mod: RT,,, | Performed by: PAIN MEDICINE

## 2021-08-24 PROCEDURE — D9220A PRA ANESTHESIA: Mod: ,,, | Performed by: NURSE ANESTHETIST, CERTIFIED REGISTERED

## 2021-08-24 PROCEDURE — 63600175 PHARM REV CODE 636 W HCPCS: Performed by: NURSE ANESTHETIST, CERTIFIED REGISTERED

## 2021-08-24 PROCEDURE — 27201423 OPTIME MED/SURG SUP & DEVICES STERILE SUPPLY: Performed by: PAIN MEDICINE

## 2021-08-24 PROCEDURE — 25000003 PHARM REV CODE 250: Performed by: PAIN MEDICINE

## 2021-08-24 PROCEDURE — 64633 DESTROY CERV/THOR FACET JNT: CPT | Performed by: PAIN MEDICINE

## 2021-08-24 PROCEDURE — 63600175 PHARM REV CODE 636 W HCPCS: Performed by: PAIN MEDICINE

## 2021-08-24 PROCEDURE — 82962 GLUCOSE BLOOD TEST: CPT

## 2021-08-24 PROCEDURE — 37000009 HC ANESTHESIA EA ADD 15 MINS: Performed by: PAIN MEDICINE

## 2021-08-24 PROCEDURE — 64634 DESTROY C/TH FACET JNT ADDL: CPT | Mod: RT,,, | Performed by: PAIN MEDICINE

## 2021-08-24 PROCEDURE — D9220A PRA ANESTHESIA: ICD-10-PCS | Mod: ,,, | Performed by: NURSE ANESTHETIST, CERTIFIED REGISTERED

## 2021-08-24 PROCEDURE — 37000008 HC ANESTHESIA 1ST 15 MINUTES: Performed by: PAIN MEDICINE

## 2021-08-24 PROCEDURE — 64634 PR DESTROY C/TH FACET JNT ADDL: ICD-10-PCS | Mod: RT,,, | Performed by: PAIN MEDICINE

## 2021-08-24 PROCEDURE — 64633 DESTROY CERV/THOR FACET JNT: CPT | Mod: RT,,, | Performed by: PAIN MEDICINE

## 2021-08-24 PROCEDURE — 64634 DESTROY C/TH FACET JNT ADDL: CPT | Performed by: PAIN MEDICINE

## 2021-08-24 RX ORDER — TRIAMCINOLONE ACETONIDE 40 MG/ML
INJECTION, SUSPENSION INTRA-ARTICULAR; INTRAMUSCULAR
Status: DISCONTINUED | OUTPATIENT
Start: 2021-08-24 | End: 2021-08-24 | Stop reason: HOSPADM

## 2021-08-24 RX ORDER — LIDOCAINE HYDROCHLORIDE 20 MG/ML
INJECTION, SOLUTION EPIDURAL; INFILTRATION; INTRACAUDAL; PERINEURAL
Status: DISCONTINUED | OUTPATIENT
Start: 2021-08-24 | End: 2021-08-24

## 2021-08-24 RX ORDER — SODIUM CHLORIDE 9 MG/ML
INJECTION, SOLUTION INTRAVENOUS CONTINUOUS
Status: DISCONTINUED | OUTPATIENT
Start: 2021-08-24 | End: 2021-08-24 | Stop reason: HOSPADM

## 2021-08-24 RX ORDER — PHENYLEPHRINE HYDROCHLORIDE 10 MG/ML
INJECTION INTRAVENOUS
Status: DISCONTINUED | OUTPATIENT
Start: 2021-08-24 | End: 2021-08-24

## 2021-08-24 RX ORDER — PROPOFOL 10 MG/ML
VIAL (ML) INTRAVENOUS
Status: DISCONTINUED | OUTPATIENT
Start: 2021-08-24 | End: 2021-08-24

## 2021-08-24 RX ORDER — ORPHENADRINE CITRATE 30 MG/ML
INJECTION INTRAMUSCULAR; INTRAVENOUS
Status: DISCONTINUED | OUTPATIENT
Start: 2021-08-24 | End: 2021-08-24

## 2021-08-24 RX ORDER — BUPIVACAINE HYDROCHLORIDE 2.5 MG/ML
INJECTION, SOLUTION INFILTRATION; PERINEURAL
Status: DISCONTINUED | OUTPATIENT
Start: 2021-08-24 | End: 2021-08-24 | Stop reason: HOSPADM

## 2021-08-24 RX ADMIN — ORPHENADRINE CITRATE 60 MG: 30 INJECTION INTRAMUSCULAR; INTRAVENOUS at 09:08

## 2021-08-24 RX ADMIN — PHENYLEPHRINE HYDROCHLORIDE 100 MCG: 10 INJECTION INTRAVENOUS at 09:08

## 2021-08-24 RX ADMIN — PROPOFOL 75 MG: 10 INJECTION, EMULSION INTRAVENOUS at 08:08

## 2021-08-24 RX ADMIN — PROPOFOL 75 MG: 10 INJECTION, EMULSION INTRAVENOUS at 09:08

## 2021-08-24 RX ADMIN — SODIUM CHLORIDE: 9 INJECTION, SOLUTION INTRAVENOUS at 08:08

## 2021-08-24 RX ADMIN — LIDOCAINE HYDROCHLORIDE 100 MG: 20 INJECTION, SOLUTION INTRAVENOUS at 09:08

## 2021-09-20 ENCOUNTER — OFFICE VISIT (OUTPATIENT)
Dept: FAMILY MEDICINE | Facility: CLINIC | Age: 81
End: 2021-09-20
Payer: COMMERCIAL

## 2021-09-20 ENCOUNTER — OFFICE VISIT (OUTPATIENT)
Dept: PAIN MEDICINE | Facility: CLINIC | Age: 81
End: 2021-09-20
Payer: COMMERCIAL

## 2021-09-20 VITALS
WEIGHT: 150 LBS | DIASTOLIC BLOOD PRESSURE: 66 MMHG | SYSTOLIC BLOOD PRESSURE: 99 MMHG | BODY MASS INDEX: 26.58 KG/M2 | HEART RATE: 110 BPM | HEIGHT: 63 IN

## 2021-09-20 VITALS
RESPIRATION RATE: 18 BRPM | OXYGEN SATURATION: 96 % | HEIGHT: 63 IN | HEART RATE: 87 BPM | BODY MASS INDEX: 26.58 KG/M2 | DIASTOLIC BLOOD PRESSURE: 76 MMHG | TEMPERATURE: 98 F | WEIGHT: 150 LBS | SYSTOLIC BLOOD PRESSURE: 116 MMHG

## 2021-09-20 DIAGNOSIS — R73.9 HYPERGLYCEMIA: ICD-10-CM

## 2021-09-20 DIAGNOSIS — M47.812 SPONDYLOSIS OF CERVICAL REGION WITHOUT MYELOPATHY OR RADICULOPATHY: Primary | Chronic | ICD-10-CM

## 2021-09-20 DIAGNOSIS — E11.69 TYPE 2 DIABETES MELLITUS WITH OTHER SPECIFIED COMPLICATION, WITHOUT LONG-TERM CURRENT USE OF INSULIN: Primary | ICD-10-CM

## 2021-09-20 DIAGNOSIS — G89.4 CHRONIC PAIN DISORDER: Chronic | ICD-10-CM

## 2021-09-20 DIAGNOSIS — G89.29 CHRONIC PAIN OF BOTH KNEES: Chronic | ICD-10-CM

## 2021-09-20 DIAGNOSIS — M25.562 CHRONIC PAIN OF BOTH KNEES: Chronic | ICD-10-CM

## 2021-09-20 DIAGNOSIS — Z79.899 OTHER LONG TERM (CURRENT) DRUG THERAPY: ICD-10-CM

## 2021-09-20 DIAGNOSIS — M25.561 CHRONIC PAIN OF BOTH KNEES: Chronic | ICD-10-CM

## 2021-09-20 LAB
BASOPHILS # BLD AUTO: 0.08 K/UL (ref 0–0.2)
BASOPHILS NFR BLD AUTO: 0.6 % (ref 0–1)
BILIRUB SERPL-MCNC: ABNORMAL MG/DL
BLOOD URINE, POC: ABNORMAL
COLOR, POC UA: ABNORMAL
DIFFERENTIAL METHOD BLD: ABNORMAL
EOSINOPHIL # BLD AUTO: 0.08 K/UL (ref 0–0.5)
EOSINOPHIL NFR BLD AUTO: 0.6 % (ref 1–4)
ERYTHROCYTE [DISTWIDTH] IN BLOOD BY AUTOMATED COUNT: 12.8 % (ref 11.5–14.5)
GLUCOSE UR QL STRIP: 100
HCT VFR BLD AUTO: 41 % (ref 38–47)
HGB BLD-MCNC: 13.7 G/DL (ref 12–16)
IMM GRANULOCYTES # BLD AUTO: 0.06 K/UL (ref 0–0.04)
IMM GRANULOCYTES NFR BLD: 0.4 % (ref 0–0.4)
KETONES UR QL STRIP: ABNORMAL
LEUKOCYTE ESTERASE URINE, POC: ABNORMAL
LYMPHOCYTES # BLD AUTO: 3.65 K/UL (ref 1–4.8)
LYMPHOCYTES NFR BLD AUTO: 26.6 % (ref 27–41)
MCH RBC QN AUTO: 30.8 PG (ref 27–31)
MCHC RBC AUTO-ENTMCNC: 33.4 G/DL (ref 32–36)
MCV RBC AUTO: 92.1 FL (ref 80–96)
MONOCYTES # BLD AUTO: 0.83 K/UL (ref 0–0.8)
MONOCYTES NFR BLD AUTO: 6 % (ref 2–6)
MPC BLD CALC-MCNC: 11.8 FL (ref 9.4–12.4)
NEUTROPHILS # BLD AUTO: 9.02 K/UL (ref 1.8–7.7)
NEUTROPHILS NFR BLD AUTO: 65.8 % (ref 53–65)
NITRITE, POC UA: ABNORMAL
NRBC # BLD AUTO: 0 X10E3/UL
NRBC, AUTO (.00): 0 %
PH, POC UA: 6
PLATELET # BLD AUTO: 315 K/UL (ref 150–400)
PROTEIN, POC: 30
RBC # BLD AUTO: 4.45 M/UL (ref 4.2–5.4)
SPECIFIC GRAVITY, POC UA: 1.01
UROBILINOGEN, POC UA: 1
WBC # BLD AUTO: 13.72 K/UL (ref 4.5–11)

## 2021-09-20 PROCEDURE — 85025 CBC WITH DIFFERENTIAL: ICD-10-PCS | Mod: QW,,, | Performed by: CLINICAL MEDICAL LABORATORY

## 2021-09-20 PROCEDURE — 99213 OFFICE O/P EST LOW 20 MIN: CPT | Mod: ,,, | Performed by: NURSE PRACTITIONER

## 2021-09-20 PROCEDURE — 99213 PR OFFICE/OUTPT VISIT, EST, LEVL III, 20-29 MIN: ICD-10-PCS | Mod: ,,, | Performed by: NURSE PRACTITIONER

## 2021-09-20 PROCEDURE — 99215 OFFICE O/P EST HI 40 MIN: CPT | Mod: PBBFAC | Performed by: PAIN MEDICINE

## 2021-09-20 PROCEDURE — 81003 POCT URINALYSIS W/O SCOPE: ICD-10-PCS | Mod: QW,,, | Performed by: NURSE PRACTITIONER

## 2021-09-20 PROCEDURE — 99214 PR OFFICE/OUTPT VISIT, EST, LEVL IV, 30-39 MIN: ICD-10-PCS | Mod: S$PBB,,, | Performed by: PAIN MEDICINE

## 2021-09-20 PROCEDURE — 85025 COMPLETE CBC W/AUTO DIFF WBC: CPT | Mod: QW,,, | Performed by: CLINICAL MEDICAL LABORATORY

## 2021-09-20 PROCEDURE — 81003 URINALYSIS AUTO W/O SCOPE: CPT | Mod: QW,,, | Performed by: NURSE PRACTITIONER

## 2021-09-20 PROCEDURE — 99214 OFFICE O/P EST MOD 30 MIN: CPT | Mod: S$PBB,,, | Performed by: PAIN MEDICINE

## 2021-09-20 RX ORDER — METHOCARBAMOL 500 MG/1
500 TABLET, FILM COATED ORAL 2 TIMES DAILY
Qty: 180 TABLET | Refills: 1 | Status: SHIPPED | OUTPATIENT
Start: 2021-09-20 | End: 2022-02-03 | Stop reason: SDUPTHER

## 2021-09-20 RX ORDER — HYDROCODONE BITARTRATE AND ACETAMINOPHEN 5; 325 MG/1; MG/1
1 TABLET ORAL EVERY 8 HOURS PRN
Qty: 90 TABLET | Refills: 0 | Status: SHIPPED | OUTPATIENT
Start: 2021-11-25 | End: 2021-12-15

## 2021-09-20 RX ORDER — GABAPENTIN 100 MG/1
100 CAPSULE ORAL 3 TIMES DAILY
Qty: 270 CAPSULE | Refills: 1 | Status: SHIPPED | OUTPATIENT
Start: 2021-09-20 | End: 2021-12-15 | Stop reason: SDUPTHER

## 2021-09-20 RX ORDER — CARVEDILOL 3.12 MG/1
3.12 TABLET ORAL 2 TIMES DAILY WITH MEALS
Qty: 90 TABLET | Refills: 1 | Status: SHIPPED | OUTPATIENT
Start: 2021-09-20 | End: 2022-02-03 | Stop reason: SDUPTHER

## 2021-09-20 RX ORDER — DULAGLUTIDE 0.75 MG/.5ML
INJECTION, SOLUTION SUBCUTANEOUS
Qty: 4 PEN | Refills: 2 | Status: SHIPPED | OUTPATIENT
Start: 2021-09-20 | End: 2021-10-06

## 2021-09-20 RX ORDER — HYDROCODONE BITARTRATE AND ACETAMINOPHEN 5; 325 MG/1; MG/1
1 TABLET ORAL EVERY 8 HOURS PRN
Qty: 90 TABLET | Refills: 0 | Status: SHIPPED | OUTPATIENT
Start: 2021-10-26 | End: 2021-11-25

## 2021-09-20 RX ORDER — HYDROCODONE BITARTRATE AND ACETAMINOPHEN 5; 325 MG/1; MG/1
1 TABLET ORAL EVERY 8 HOURS PRN
Qty: 90 TABLET | Refills: 0 | Status: SHIPPED | OUTPATIENT
Start: 2021-09-26 | End: 2021-10-26

## 2021-09-20 RX ORDER — AMITRIPTYLINE HYDROCHLORIDE 25 MG/1
25 TABLET, FILM COATED ORAL NIGHTLY PRN
COMMUNITY
End: 2021-09-20 | Stop reason: SDUPTHER

## 2021-09-20 RX ORDER — FUROSEMIDE 40 MG/1
40 TABLET ORAL DAILY
Qty: 90 TABLET | Refills: 1 | Status: SHIPPED | OUTPATIENT
Start: 2021-09-20 | End: 2022-02-03 | Stop reason: SDUPTHER

## 2021-09-20 RX ORDER — SERTRALINE HYDROCHLORIDE 50 MG/1
50 TABLET, FILM COATED ORAL DAILY
Qty: 90 TABLET | Refills: 1 | Status: SHIPPED | OUTPATIENT
Start: 2021-09-20 | End: 2022-01-20 | Stop reason: SDUPTHER

## 2021-09-20 RX ORDER — ESOMEPRAZOLE MAGNESIUM 40 MG/1
40 CAPSULE, DELAYED RELEASE ORAL DAILY
Qty: 90 CAPSULE | Refills: 1 | Status: SHIPPED | OUTPATIENT
Start: 2021-09-20 | End: 2022-02-03 | Stop reason: SDUPTHER

## 2021-09-20 RX ORDER — LISINOPRIL 5 MG/1
5 TABLET ORAL DAILY
Qty: 90 TABLET | Refills: 1 | Status: SHIPPED | OUTPATIENT
Start: 2021-09-20 | End: 2021-12-21 | Stop reason: SDUPTHER

## 2021-09-20 RX ORDER — SPIRONOLACTONE 25 MG/1
25 TABLET ORAL DAILY
Qty: 90 TABLET | Refills: 1 | Status: SHIPPED | OUTPATIENT
Start: 2021-09-20 | End: 2021-12-21 | Stop reason: SDUPTHER

## 2021-09-20 RX ORDER — METOCLOPRAMIDE 10 MG/1
10 TABLET ORAL 3 TIMES DAILY PRN
Qty: 270 TABLET | Refills: 1 | Status: CANCELLED | OUTPATIENT
Start: 2021-09-20

## 2021-09-20 RX ORDER — AMITRIPTYLINE HYDROCHLORIDE 25 MG/1
25 TABLET, FILM COATED ORAL NIGHTLY PRN
Qty: 90 TABLET | Refills: 1 | Status: SHIPPED | OUTPATIENT
Start: 2021-09-20 | End: 2022-02-03 | Stop reason: SDUPTHER

## 2021-09-20 RX ORDER — MONTELUKAST SODIUM 10 MG/1
10 TABLET ORAL NIGHTLY
Qty: 90 TABLET | Refills: 1 | Status: SHIPPED | OUTPATIENT
Start: 2021-09-20 | End: 2021-12-21 | Stop reason: SDUPTHER

## 2021-09-20 RX ORDER — ROSUVASTATIN CALCIUM 20 MG/1
20 TABLET, COATED ORAL DAILY
Qty: 90 TABLET | Refills: 1 | Status: SHIPPED | OUTPATIENT
Start: 2021-09-20 | End: 2022-01-20 | Stop reason: SDUPTHER

## 2021-09-23 ENCOUNTER — OFFICE VISIT (OUTPATIENT)
Dept: FAMILY MEDICINE | Facility: CLINIC | Age: 81
End: 2021-09-23
Payer: COMMERCIAL

## 2021-09-23 VITALS
TEMPERATURE: 99 F | HEART RATE: 83 BPM | OXYGEN SATURATION: 95 % | SYSTOLIC BLOOD PRESSURE: 118 MMHG | WEIGHT: 153 LBS | DIASTOLIC BLOOD PRESSURE: 70 MMHG | HEIGHT: 63 IN | BODY MASS INDEX: 27.11 KG/M2 | RESPIRATION RATE: 18 BRPM

## 2021-09-23 DIAGNOSIS — E11.69 TYPE 2 DIABETES MELLITUS WITH OTHER SPECIFIED COMPLICATION, WITHOUT LONG-TERM CURRENT USE OF INSULIN: ICD-10-CM

## 2021-09-23 DIAGNOSIS — R73.9 HYPERGLYCEMIA: Primary | ICD-10-CM

## 2021-09-23 PROCEDURE — 99212 OFFICE O/P EST SF 10 MIN: CPT | Mod: ,,, | Performed by: NURSE PRACTITIONER

## 2021-09-23 PROCEDURE — 99212 PR OFFICE/OUTPT VISIT, EST, LEVL II, 10-19 MIN: ICD-10-PCS | Mod: ,,, | Performed by: NURSE PRACTITIONER

## 2021-10-01 ENCOUNTER — TELEPHONE (OUTPATIENT)
Dept: FAMILY MEDICINE | Facility: CLINIC | Age: 81
End: 2021-10-01

## 2021-10-04 ENCOUNTER — TELEPHONE (OUTPATIENT)
Dept: FAMILY MEDICINE | Facility: CLINIC | Age: 81
End: 2021-10-04

## 2021-10-06 ENCOUNTER — OFFICE VISIT (OUTPATIENT)
Dept: FAMILY MEDICINE | Facility: CLINIC | Age: 81
End: 2021-10-06
Payer: COMMERCIAL

## 2021-10-06 VITALS
HEART RATE: 87 BPM | BODY MASS INDEX: 27.11 KG/M2 | OXYGEN SATURATION: 98 % | SYSTOLIC BLOOD PRESSURE: 100 MMHG | WEIGHT: 153 LBS | RESPIRATION RATE: 18 BRPM | DIASTOLIC BLOOD PRESSURE: 62 MMHG | TEMPERATURE: 99 F | HEIGHT: 63 IN

## 2021-10-06 DIAGNOSIS — Z23 NEEDS FLU SHOT: Primary | ICD-10-CM

## 2021-10-06 DIAGNOSIS — E11.9 DIABETES MELLITUS WITHOUT COMPLICATION: ICD-10-CM

## 2021-10-06 PROCEDURE — G0008 FLU VACCINE - QUADRIVALENT - HIGH DOSE (65+) PRESERVATIVE FREE IM: ICD-10-PCS | Mod: ,,, | Performed by: FAMILY MEDICINE

## 2021-10-06 PROCEDURE — 99213 PR OFFICE/OUTPT VISIT, EST, LEVL III, 20-29 MIN: ICD-10-PCS | Mod: ,,, | Performed by: FAMILY MEDICINE

## 2021-10-06 PROCEDURE — G0008 ADMIN INFLUENZA VIRUS VAC: HCPCS | Mod: ,,, | Performed by: FAMILY MEDICINE

## 2021-10-06 PROCEDURE — 90662 IIV NO PRSV INCREASED AG IM: CPT | Mod: ,,, | Performed by: FAMILY MEDICINE

## 2021-10-06 PROCEDURE — 90662 FLU VACCINE - QUADRIVALENT - HIGH DOSE (65+) PRESERVATIVE FREE IM: ICD-10-PCS | Mod: ,,, | Performed by: FAMILY MEDICINE

## 2021-10-06 PROCEDURE — 99213 OFFICE O/P EST LOW 20 MIN: CPT | Mod: ,,, | Performed by: FAMILY MEDICINE

## 2021-10-06 RX ORDER — INSULIN DEGLUDEC 100 U/ML
10 INJECTION, SOLUTION SUBCUTANEOUS
COMMUNITY
End: 2022-02-03 | Stop reason: SDUPTHER

## 2021-10-06 RX ORDER — DULAGLUTIDE 1.5 MG/.5ML
INJECTION, SOLUTION SUBCUTANEOUS
COMMUNITY
End: 2021-10-06 | Stop reason: SDUPTHER

## 2021-10-06 RX ORDER — DULAGLUTIDE 1.5 MG/.5ML
1.5 INJECTION, SOLUTION SUBCUTANEOUS
Qty: 4 PEN | Refills: 3 | Status: SHIPPED | OUTPATIENT
Start: 2021-10-06 | End: 2022-02-03 | Stop reason: SDUPTHER

## 2021-10-21 ENCOUNTER — OFFICE VISIT (OUTPATIENT)
Dept: FAMILY MEDICINE | Facility: CLINIC | Age: 81
End: 2021-10-21
Payer: COMMERCIAL

## 2021-10-21 VITALS
WEIGHT: 153 LBS | HEART RATE: 102 BPM | DIASTOLIC BLOOD PRESSURE: 60 MMHG | BODY MASS INDEX: 27.11 KG/M2 | SYSTOLIC BLOOD PRESSURE: 88 MMHG | TEMPERATURE: 99 F | HEIGHT: 63 IN | OXYGEN SATURATION: 96 % | RESPIRATION RATE: 20 BRPM

## 2021-10-21 DIAGNOSIS — E11.9 DIABETES MELLITUS WITHOUT COMPLICATION: Primary | ICD-10-CM

## 2021-10-21 PROCEDURE — 99212 OFFICE O/P EST SF 10 MIN: CPT | Mod: ,,, | Performed by: FAMILY MEDICINE

## 2021-10-21 PROCEDURE — 99212 PR OFFICE/OUTPT VISIT, EST, LEVL II, 10-19 MIN: ICD-10-PCS | Mod: ,,, | Performed by: FAMILY MEDICINE

## 2021-12-15 PROBLEM — M47.812 SPONDYLOSIS OF CERVICAL JOINT WITHOUT MYELOPATHY: Chronic | Status: ACTIVE | Noted: 2021-08-05

## 2021-12-20 ENCOUNTER — OFFICE VISIT (OUTPATIENT)
Dept: PAIN MEDICINE | Facility: CLINIC | Age: 81
End: 2021-12-20
Payer: COMMERCIAL

## 2021-12-20 VITALS
WEIGHT: 152 LBS | DIASTOLIC BLOOD PRESSURE: 56 MMHG | HEART RATE: 102 BPM | BODY MASS INDEX: 26.93 KG/M2 | SYSTOLIC BLOOD PRESSURE: 113 MMHG | RESPIRATION RATE: 20 BRPM | HEIGHT: 63 IN

## 2021-12-20 DIAGNOSIS — Z79.899 ENCOUNTER FOR LONG-TERM (CURRENT) USE OF OTHER MEDICATIONS: ICD-10-CM

## 2021-12-20 DIAGNOSIS — M47.812 SPONDYLOSIS OF CERVICAL JOINT WITHOUT MYELOPATHY: Primary | Chronic | ICD-10-CM

## 2021-12-20 DIAGNOSIS — M54.16 LUMBAR RADICULOPATHY: Chronic | ICD-10-CM

## 2021-12-20 DIAGNOSIS — M89.49 OSTEOARTHROSIS MULTIPLE SITES, NOT SPECIFIED AS GENERALIZED: Chronic | ICD-10-CM

## 2021-12-20 LAB
CTP QC/QA: YES
POC (AMP) AMPHETAMINE: NEGATIVE
POC (BAR) BARBITURATES: NEGATIVE
POC (BUP) BUPRENORPHINE: NEGATIVE
POC (BZO) BENZODIAZEPINES: NEGATIVE
POC (COC) COCAINE: NEGATIVE
POC (MDMA) METHYLENEDIOXYMETHAMPHETAMINE 3,4: NEGATIVE
POC (MET) METHAMPHETAMINE: NEGATIVE
POC (MOP) OPIATES: ABNORMAL
POC (MTD) METHADONE: NEGATIVE
POC (OXY) OXYCODONE: NEGATIVE
POC (PCP) PHENCYCLIDINE: NEGATIVE
POC (TCA) TRICYCLIC ANTIDEPRESSANTS: NEGATIVE
POC TEMPERATURE (URINE): 92
POC THC: NEGATIVE

## 2021-12-20 PROCEDURE — 80305 DRUG TEST PRSMV DIR OPT OBS: CPT | Mod: PBBFAC | Performed by: PHYSICIAN ASSISTANT

## 2021-12-20 PROCEDURE — 99214 PR OFFICE/OUTPT VISIT, EST, LEVL IV, 30-39 MIN: ICD-10-PCS | Mod: S$PBB,,, | Performed by: PHYSICIAN ASSISTANT

## 2021-12-20 PROCEDURE — 99214 OFFICE O/P EST MOD 30 MIN: CPT | Mod: S$PBB,,, | Performed by: PHYSICIAN ASSISTANT

## 2021-12-20 PROCEDURE — 99215 OFFICE O/P EST HI 40 MIN: CPT | Mod: PBBFAC | Performed by: PHYSICIAN ASSISTANT

## 2021-12-20 RX ORDER — GABAPENTIN 100 MG/1
100 CAPSULE ORAL EVERY 8 HOURS
Qty: 270 CAPSULE | Refills: 0 | Status: SHIPPED | OUTPATIENT
Start: 2021-12-20 | End: 2022-03-24 | Stop reason: SDUPTHER

## 2021-12-20 RX ORDER — HYDROCODONE BITARTRATE AND ACETAMINOPHEN 5; 325 MG/1; MG/1
1 TABLET ORAL EVERY 8 HOURS
Qty: 90 TABLET | Refills: 0 | Status: SHIPPED | OUTPATIENT
Start: 2022-02-24 | End: 2022-03-24 | Stop reason: SDUPTHER

## 2021-12-20 RX ORDER — HYDROCODONE BITARTRATE AND ACETAMINOPHEN 5; 325 MG/1; MG/1
1 TABLET ORAL EVERY 8 HOURS
Qty: 90 TABLET | Refills: 0 | Status: SHIPPED | OUTPATIENT
Start: 2021-12-24 | End: 2022-01-23

## 2021-12-20 RX ORDER — HYDROCODONE BITARTRATE AND ACETAMINOPHEN 5; 325 MG/1; MG/1
1 TABLET ORAL EVERY 8 HOURS
Qty: 90 TABLET | Refills: 0 | Status: SHIPPED | OUTPATIENT
Start: 2022-01-25 | End: 2022-02-24

## 2021-12-21 ENCOUNTER — OFFICE VISIT (OUTPATIENT)
Dept: FAMILY MEDICINE | Facility: CLINIC | Age: 81
End: 2021-12-21
Payer: COMMERCIAL

## 2021-12-21 VITALS
TEMPERATURE: 97 F | SYSTOLIC BLOOD PRESSURE: 104 MMHG | WEIGHT: 153 LBS | HEART RATE: 86 BPM | RESPIRATION RATE: 18 BRPM | HEIGHT: 63 IN | OXYGEN SATURATION: 97 % | DIASTOLIC BLOOD PRESSURE: 66 MMHG | BODY MASS INDEX: 27.11 KG/M2

## 2021-12-21 DIAGNOSIS — E03.9 HYPOTHYROIDISM, UNSPECIFIED TYPE: ICD-10-CM

## 2021-12-21 DIAGNOSIS — E11.9 CONTROLLED TYPE 2 DIABETES MELLITUS WITHOUT COMPLICATION, WITHOUT LONG-TERM CURRENT USE OF INSULIN: ICD-10-CM

## 2021-12-21 DIAGNOSIS — E78.5 DYSLIPIDEMIA: ICD-10-CM

## 2021-12-21 DIAGNOSIS — F32.A DEPRESSION, UNSPECIFIED DEPRESSION TYPE: ICD-10-CM

## 2021-12-21 DIAGNOSIS — J44.9 CHRONIC OBSTRUCTIVE PULMONARY DISEASE, UNSPECIFIED COPD TYPE: ICD-10-CM

## 2021-12-21 DIAGNOSIS — I10 HYPERTENSION, UNSPECIFIED TYPE: Primary | ICD-10-CM

## 2021-12-21 PROCEDURE — 99214 PR OFFICE/OUTPT VISIT, EST, LEVL IV, 30-39 MIN: ICD-10-PCS | Mod: ,,, | Performed by: NURSE PRACTITIONER

## 2021-12-21 PROCEDURE — 99214 OFFICE O/P EST MOD 30 MIN: CPT | Mod: ,,, | Performed by: NURSE PRACTITIONER

## 2021-12-21 RX ORDER — LISINOPRIL 5 MG/1
5 TABLET ORAL DAILY
Qty: 90 TABLET | Refills: 1 | Status: SHIPPED | OUTPATIENT
Start: 2021-12-21 | End: 2022-02-03 | Stop reason: SDUPTHER

## 2021-12-21 RX ORDER — MONTELUKAST SODIUM 10 MG/1
10 TABLET ORAL NIGHTLY
Qty: 90 TABLET | Refills: 1 | Status: SHIPPED | OUTPATIENT
Start: 2021-12-21 | End: 2022-02-03 | Stop reason: SDUPTHER

## 2021-12-21 RX ORDER — SPIRONOLACTONE 25 MG/1
25 TABLET ORAL DAILY
Qty: 90 TABLET | Refills: 1 | Status: SHIPPED | OUTPATIENT
Start: 2021-12-21 | End: 2022-02-03 | Stop reason: SDUPTHER

## 2022-01-20 ENCOUNTER — OFFICE VISIT (OUTPATIENT)
Dept: FAMILY MEDICINE | Facility: CLINIC | Age: 82
End: 2022-01-20
Payer: COMMERCIAL

## 2022-01-20 VITALS
DIASTOLIC BLOOD PRESSURE: 56 MMHG | WEIGHT: 153 LBS | BODY MASS INDEX: 27.11 KG/M2 | HEIGHT: 63 IN | HEART RATE: 90 BPM | OXYGEN SATURATION: 98 % | TEMPERATURE: 98 F | SYSTOLIC BLOOD PRESSURE: 92 MMHG | RESPIRATION RATE: 17 BRPM

## 2022-01-20 DIAGNOSIS — U07.1 COVID: ICD-10-CM

## 2022-01-20 DIAGNOSIS — Z20.822 CLOSE EXPOSURE TO COVID-19 VIRUS: Primary | ICD-10-CM

## 2022-01-20 DIAGNOSIS — E78.5 DYSLIPIDEMIA: ICD-10-CM

## 2022-01-20 DIAGNOSIS — F32.A DEPRESSION, UNSPECIFIED DEPRESSION TYPE: ICD-10-CM

## 2022-01-20 LAB
CTP QC/QA: YES
SARS-COV-2 AG RESP QL IA.RAPID: POSITIVE

## 2022-01-20 PROCEDURE — 3074F SYST BP LT 130 MM HG: CPT | Mod: ,,, | Performed by: FAMILY MEDICINE

## 2022-01-20 PROCEDURE — 99213 OFFICE O/P EST LOW 20 MIN: CPT | Mod: ,,, | Performed by: FAMILY MEDICINE

## 2022-01-20 PROCEDURE — 3074F PR MOST RECENT SYSTOLIC BLOOD PRESSURE < 130 MM HG: ICD-10-PCS | Mod: ,,, | Performed by: FAMILY MEDICINE

## 2022-01-20 PROCEDURE — 87426 SARSCOV CORONAVIRUS AG IA: CPT | Mod: QW,,, | Performed by: FAMILY MEDICINE

## 2022-01-20 PROCEDURE — 1159F PR MEDICATION LIST DOCUMENTED IN MEDICAL RECORD: ICD-10-PCS | Mod: ,,, | Performed by: FAMILY MEDICINE

## 2022-01-20 PROCEDURE — 3288F PR FALLS RISK ASSESSMENT DOCUMENTED: ICD-10-PCS | Mod: ,,, | Performed by: FAMILY MEDICINE

## 2022-01-20 PROCEDURE — 1101F PR PT FALLS ASSESS DOC 0-1 FALLS W/OUT INJ PAST YR: ICD-10-PCS | Mod: ,,, | Performed by: FAMILY MEDICINE

## 2022-01-20 PROCEDURE — 99213 PR OFFICE/OUTPT VISIT, EST, LEVL III, 20-29 MIN: ICD-10-PCS | Mod: ,,, | Performed by: FAMILY MEDICINE

## 2022-01-20 PROCEDURE — 1101F PT FALLS ASSESS-DOCD LE1/YR: CPT | Mod: ,,, | Performed by: FAMILY MEDICINE

## 2022-01-20 PROCEDURE — 3078F DIAST BP <80 MM HG: CPT | Mod: ,,, | Performed by: FAMILY MEDICINE

## 2022-01-20 PROCEDURE — 1160F RVW MEDS BY RX/DR IN RCRD: CPT | Mod: ,,, | Performed by: FAMILY MEDICINE

## 2022-01-20 PROCEDURE — 3078F PR MOST RECENT DIASTOLIC BLOOD PRESSURE < 80 MM HG: ICD-10-PCS | Mod: ,,, | Performed by: FAMILY MEDICINE

## 2022-01-20 PROCEDURE — 1160F PR REVIEW ALL MEDS BY PRESCRIBER/CLIN PHARMACIST DOCUMENTED: ICD-10-PCS | Mod: ,,, | Performed by: FAMILY MEDICINE

## 2022-01-20 PROCEDURE — 87426 SARS CORONAVIRUS 2 ANTIGEN POCT: ICD-10-PCS | Mod: QW,,, | Performed by: FAMILY MEDICINE

## 2022-01-20 PROCEDURE — 3288F FALL RISK ASSESSMENT DOCD: CPT | Mod: ,,, | Performed by: FAMILY MEDICINE

## 2022-01-20 PROCEDURE — 1159F MED LIST DOCD IN RCRD: CPT | Mod: ,,, | Performed by: FAMILY MEDICINE

## 2022-01-20 RX ORDER — AZITHROMYCIN 250 MG/1
TABLET, FILM COATED ORAL
Qty: 6 TABLET | Refills: 0 | Status: SHIPPED | OUTPATIENT
Start: 2022-01-20 | End: 2022-01-25

## 2022-01-20 RX ORDER — SERTRALINE HYDROCHLORIDE 50 MG/1
50 TABLET, FILM COATED ORAL DAILY
Qty: 90 TABLET | Refills: 1 | Status: SHIPPED | OUTPATIENT
Start: 2022-01-20 | End: 2022-06-27 | Stop reason: SDUPTHER

## 2022-01-20 RX ORDER — BENZONATATE 100 MG/1
100 CAPSULE ORAL 3 TIMES DAILY PRN
Qty: 60 CAPSULE | Refills: 1 | Status: SHIPPED | OUTPATIENT
Start: 2022-01-20 | End: 2022-01-30

## 2022-01-20 RX ORDER — ROSUVASTATIN CALCIUM 20 MG/1
20 TABLET, COATED ORAL DAILY
Qty: 90 TABLET | Refills: 1 | Status: SHIPPED | OUTPATIENT
Start: 2022-01-20 | End: 2022-02-10

## 2022-01-20 NOTE — PROGRESS NOTES
Sharee Elizondo is a 81 y.o. female seen today for follow-up on her depression and elevated cholesterol.  She has been directly exposed to COVID and unfortunately did test positive today.  She was seen as a car visit today.  She reports some minor fatigue but no fever and she has had her vaccine x2.      Past Medical History:   Diagnosis Date    Atrial fibrillation     Automatic implantable cardiac defibrillator in situ     Bilateral primary osteoarthritis of knee     Cervical spondylosis without myelopathy     CHF (congestive heart failure)     Chronic ischemic heart disease     Chronic kidney disease (CKD), stage III (moderate)     Chronic pain syndrome     COPD (chronic obstructive pulmonary disease)     Coronary arteriosclerosis     Depressive disorder     GERD (gastroesophageal reflux disease)     Hypothyroidism     Low back pain     Lumbar radiculopathy     Metabolic syndrome     Myocardial ischemia     Other long term (current) drug therapy     Radiculopathy, cervical region     Type 2 diabetes mellitus      Family History   Problem Relation Age of Onset    Hypertension Mother     Diabetes Mellitus Mother      Current Outpatient Medications on File Prior to Visit   Medication Sig Dispense Refill    amitriptyline (ELAVIL) 25 MG tablet Take 1 tablet (25 mg total) by mouth nightly as needed for Insomnia. 90 tablet 1    ascorbic acid, vitamin C, (VITAMIN C) 500 MG tablet Take 500 mg by mouth once daily.      aspirin (ECOTRIN) 81 MG EC tablet Take 81 mg by mouth once daily.      carvediloL (COREG) 3.125 MG tablet Take 1 tablet (3.125 mg total) by mouth 2 (two) times daily with meals. 90 tablet 1    cholecalciferol, vitamin D3, (VITAMIN D3) 25 mcg (1,000 unit) capsule Take 1,000 Units by mouth once daily.      dulaglutide (TRULICITY) 1.5 mg/0.5 mL pen injector Inject 1.5 mg into the skin every 7 days. 4 pen 3    esomeprazole (NEXIUM) 40 MG capsule Take 1 capsule (40 mg total) by mouth once  daily. 90 capsule 1    furosemide (LASIX) 40 MG tablet Take 1 tablet (40 mg total) by mouth once daily. 90 tablet 1    gabapentin (NEURONTIN) 100 MG capsule Take 1 capsule (100 mg total) by mouth every 8 (eight) hours. 270 capsule 0    HYDROcodone-acetaminophen (NORCO) 5-325 mg per tablet Take 1 tablet by mouth every 8 (eight) hours. 90 tablet 0    [START ON 1/25/2022] HYDROcodone-acetaminophen (NORCO) 5-325 mg per tablet Take 1 tablet by mouth every 8 (eight) hours. 90 tablet 0    [START ON 2/24/2022] HYDROcodone-acetaminophen (NORCO) 5-325 mg per tablet Take 1 tablet by mouth every 8 (eight) hours. 90 tablet 0    insulin degludec (TRESIBA FLEXTOUCH U-100) 100 unit/mL (3 mL) insulin pen Inject 10 Units into the skin. Inject 10 units into skin daily      levothyroxine (SYNTHROID) 25 MCG tablet Take 12.5 mcg by mouth before breakfast.      lisinopriL (PRINIVIL,ZESTRIL) 5 MG tablet Take 1 tablet (5 mg total) by mouth once daily. 90 tablet 1    MELATONIN, BULK, MISC by Misc.(Non-Drug; Combo Route) route.      methocarbamoL (ROBAXIN) 500 MG Tab Take 1 tablet (500 mg total) by mouth 2 (two) times daily. 180 tablet 1    metoclopramide HCl (REGLAN) 10 MG tablet TAKE 1 TABLET BY MOUTH 3 TIMES A DAY AS NEEDED 90 tablet 0    montelukast (SINGULAIR) 10 mg tablet Take 1 tablet (10 mg total) by mouth every evening. 90 tablet 1    nitroGLYCERIN (NITROSTAT) 0.4 MG SL tablet Place 0.4 mg under the tongue every 5 (five) minutes as needed.      spironolactone (ALDACTONE) 25 MG tablet Take 1 tablet (25 mg total) by mouth once daily. 90 tablet 1    [DISCONTINUED] rosuvastatin (CRESTOR) 20 MG tablet Take 1 tablet (20 mg total) by mouth once daily. 90 tablet 1    [DISCONTINUED] sertraline (ZOLOFT) 50 MG tablet Take 1 tablet (50 mg total) by mouth once daily. 90 tablet 1     No current facility-administered medications on file prior to visit.     Immunization History   Administered Date(s) Administered    COVID-19, MRNA,  LN-S, PF (MODERNA FULL 0.5 ML DOSE) 03/10/2021, 04/07/2021    Influenza - Quadrivalent - High Dose - PF (65 years and older) 10/06/2021       Review of Systems   Constitutional: Positive for malaise/fatigue. Negative for fever and weight loss.   HENT: Negative for congestion.    Respiratory: Negative for cough and shortness of breath.    Cardiovascular: Negative for chest pain and palpitations.   Gastrointestinal: Negative for nausea and vomiting.   Psychiatric/Behavioral: Negative for depression.        Vitals:    01/20/22 0950   BP: (!) 92/56   Pulse: 90   Resp: 17   Temp: 97.9 °F (36.6 °C)       Physical Exam  Eyes:      Conjunctiva/sclera: Conjunctivae normal.      Pupils: Pupils are equal, round, and reactive to light.   Pulmonary:      Effort: Pulmonary effort is normal.   Neurological:      Mental Status: She is alert.          Assessment and Plan  Close exposure to COVID-19 virus  -     SARS Coronavirus 2 Antigen, POCT    COVID  -     azithromycin (Z-MARIE) 250 MG tablet; Take 2 tablets by mouth on day 1; Take 1 tablet by mouth on days 2-5  Dispense: 6 tablet; Refill: 0  -     benzonatate (TESSALON) 100 MG capsule; Take 1 capsule (100 mg total) by mouth 3 (three) times daily as needed for Cough.  Dispense: 60 capsule; Refill: 1    Dyslipidemia  -     rosuvastatin (CRESTOR) 20 MG tablet; Take 1 tablet (20 mg total) by mouth once daily.  Dispense: 90 tablet; Refill: 1    Depression, unspecified depression type  -     sertraline (ZOLOFT) 50 MG tablet; Take 1 tablet (50 mg total) by mouth once daily.  Dispense: 90 tablet; Refill: 1            Return to clinic if symptoms start to worsen.  At that point may consider the infusion.  Patient will rest, push her fluids and use Mucinex 600 mg b.i.d. She will follow-up in 2 weeks for re-evaluation and lab work.    Health Maintenance Topics with due status: Not Due       Topic Last Completion Date    Lipid Panel 08/03/2021    Hemoglobin A1c 08/03/2021

## 2022-01-31 ENCOUNTER — HOSPITAL ENCOUNTER (OUTPATIENT)
Dept: RADIOLOGY | Facility: HOSPITAL | Age: 82
Discharge: HOME | End: 2022-01-31
Payer: COMMERCIAL

## 2022-01-31 DIAGNOSIS — Z12.31 VISIT FOR SCREENING MAMMOGRAM: ICD-10-CM

## 2022-01-31 PROCEDURE — 77067 SCR MAMMO BI INCL CAD: CPT | Mod: TC

## 2022-02-03 ENCOUNTER — OFFICE VISIT (OUTPATIENT)
Dept: FAMILY MEDICINE | Facility: CLINIC | Age: 82
End: 2022-02-03
Payer: COMMERCIAL

## 2022-02-03 VITALS
RESPIRATION RATE: 18 BRPM | TEMPERATURE: 98 F | WEIGHT: 149 LBS | SYSTOLIC BLOOD PRESSURE: 106 MMHG | DIASTOLIC BLOOD PRESSURE: 62 MMHG | BODY MASS INDEX: 26.4 KG/M2 | HEART RATE: 71 BPM | OXYGEN SATURATION: 97 % | HEIGHT: 63 IN

## 2022-02-03 DIAGNOSIS — E11.9 CONTROLLED TYPE 2 DIABETES MELLITUS WITHOUT COMPLICATION, WITHOUT LONG-TERM CURRENT USE OF INSULIN: ICD-10-CM

## 2022-02-03 DIAGNOSIS — E11.9 DIABETES MELLITUS WITHOUT COMPLICATION: ICD-10-CM

## 2022-02-03 DIAGNOSIS — F32.A DEPRESSION, UNSPECIFIED DEPRESSION TYPE: ICD-10-CM

## 2022-02-03 DIAGNOSIS — E03.9 HYPOTHYROIDISM, UNSPECIFIED TYPE: ICD-10-CM

## 2022-02-03 DIAGNOSIS — I10 HYPERTENSION, UNSPECIFIED TYPE: Primary | ICD-10-CM

## 2022-02-03 DIAGNOSIS — E78.5 DYSLIPIDEMIA: ICD-10-CM

## 2022-02-03 LAB
ALBUMIN SERPL BCP-MCNC: 3.7 G/DL (ref 3.5–5)
ALBUMIN/GLOB SERPL: 1 {RATIO}
ALP SERPL-CCNC: 101 U/L (ref 55–142)
ALT SERPL W P-5'-P-CCNC: 15 U/L (ref 13–56)
ANION GAP SERPL CALCULATED.3IONS-SCNC: 12 MMOL/L (ref 7–16)
AST SERPL W P-5'-P-CCNC: 10 U/L (ref 15–37)
BASOPHILS # BLD AUTO: 0.11 K/UL (ref 0–0.2)
BASOPHILS NFR BLD AUTO: 1.3 % (ref 0–1)
BILIRUB SERPL-MCNC: 0.4 MG/DL (ref 0–1.2)
BUN SERPL-MCNC: 19 MG/DL (ref 7–18)
BUN/CREAT SERPL: 18 (ref 6–20)
CALCIUM SERPL-MCNC: 9.5 MG/DL (ref 8.5–10.1)
CHLORIDE SERPL-SCNC: 101 MMOL/L (ref 98–107)
CHOLEST SERPL-MCNC: 294 MG/DL (ref 0–200)
CHOLEST/HDLC SERPL: 8.4 {RATIO}
CO2 SERPL-SCNC: 24 MMOL/L (ref 21–32)
CREAT SERPL-MCNC: 1.04 MG/DL (ref 0.55–1.02)
DIFFERENTIAL METHOD BLD: ABNORMAL
EOSINOPHIL # BLD AUTO: 0.32 K/UL (ref 0–0.5)
EOSINOPHIL NFR BLD AUTO: 3.7 % (ref 1–4)
ERYTHROCYTE [DISTWIDTH] IN BLOOD BY AUTOMATED COUNT: 13.2 % (ref 11.5–14.5)
EST. AVERAGE GLUCOSE BLD GHB EST-MCNC: 127 MG/DL
GLOBULIN SER-MCNC: 3.8 G/DL (ref 2–4)
GLUCOSE SERPL-MCNC: 106 MG/DL (ref 74–106)
HBA1C MFR BLD HPLC: 6.4 % (ref 4.5–6.6)
HCT VFR BLD AUTO: 40 % (ref 38–47)
HDLC SERPL-MCNC: 35 MG/DL (ref 40–60)
HGB BLD-MCNC: 13.4 G/DL (ref 12–16)
IMM GRANULOCYTES # BLD AUTO: 0.03 K/UL (ref 0–0.04)
IMM GRANULOCYTES NFR BLD: 0.4 % (ref 0–0.4)
LDLC SERPL CALC-MCNC: 200 MG/DL
LDLC/HDLC SERPL: 5.7 {RATIO}
LYMPHOCYTES # BLD AUTO: 2.52 K/UL (ref 1–4.8)
LYMPHOCYTES NFR BLD AUTO: 29.4 % (ref 27–41)
MCH RBC QN AUTO: 30 PG (ref 27–31)
MCHC RBC AUTO-ENTMCNC: 33.5 G/DL (ref 32–36)
MCV RBC AUTO: 89.7 FL (ref 80–96)
MONOCYTES # BLD AUTO: 0.55 K/UL (ref 0–0.8)
MONOCYTES NFR BLD AUTO: 6.4 % (ref 2–6)
MPC BLD CALC-MCNC: 11.3 FL (ref 9.4–12.4)
NEUTROPHILS # BLD AUTO: 5.03 K/UL (ref 1.8–7.7)
NEUTROPHILS NFR BLD AUTO: 58.8 % (ref 53–65)
NONHDLC SERPL-MCNC: 259 MG/DL
NRBC # BLD AUTO: 0 X10E3/UL
NRBC, AUTO (.00): 0 %
PLATELET # BLD AUTO: 311 K/UL (ref 150–400)
POTASSIUM SERPL-SCNC: 3.9 MMOL/L (ref 3.5–5.1)
PROT SERPL-MCNC: 7.5 G/DL (ref 6.4–8.2)
RBC # BLD AUTO: 4.46 M/UL (ref 4.2–5.4)
SODIUM SERPL-SCNC: 133 MMOL/L (ref 136–145)
TRIGL SERPL-MCNC: 296 MG/DL (ref 35–150)
TSH SERPL DL<=0.005 MIU/L-ACNC: 4.95 UIU/ML (ref 0.36–3.74)
VLDLC SERPL-MCNC: 59 MG/DL
WBC # BLD AUTO: 8.56 K/UL (ref 4.5–11)

## 2022-02-03 PROCEDURE — 99214 PR OFFICE/OUTPT VISIT, EST, LEVL IV, 30-39 MIN: ICD-10-PCS | Mod: ,,, | Performed by: NURSE PRACTITIONER

## 2022-02-03 PROCEDURE — 1126F PR PAIN SEVERITY QUANTIFIED, NO PAIN PRESENT: ICD-10-PCS | Mod: ,,, | Performed by: NURSE PRACTITIONER

## 2022-02-03 PROCEDURE — 1159F MED LIST DOCD IN RCRD: CPT | Mod: ,,, | Performed by: NURSE PRACTITIONER

## 2022-02-03 PROCEDURE — 1159F PR MEDICATION LIST DOCUMENTED IN MEDICAL RECORD: ICD-10-PCS | Mod: ,,, | Performed by: NURSE PRACTITIONER

## 2022-02-03 PROCEDURE — 1160F PR REVIEW ALL MEDS BY PRESCRIBER/CLIN PHARMACIST DOCUMENTED: ICD-10-PCS | Mod: ,,, | Performed by: NURSE PRACTITIONER

## 2022-02-03 PROCEDURE — 3074F SYST BP LT 130 MM HG: CPT | Mod: ,,, | Performed by: NURSE PRACTITIONER

## 2022-02-03 PROCEDURE — 3078F DIAST BP <80 MM HG: CPT | Mod: ,,, | Performed by: NURSE PRACTITIONER

## 2022-02-03 PROCEDURE — 3288F PR FALLS RISK ASSESSMENT DOCUMENTED: ICD-10-PCS | Mod: ,,, | Performed by: NURSE PRACTITIONER

## 2022-02-03 PROCEDURE — 80053 COMPREHENSIVE METABOLIC PANEL: ICD-10-PCS | Mod: ,,, | Performed by: CLINICAL MEDICAL LABORATORY

## 2022-02-03 PROCEDURE — 1100F PR PT FALLS ASSESS DOC 2+ FALLS/FALL W/INJURY/YR: ICD-10-PCS | Mod: ,,, | Performed by: NURSE PRACTITIONER

## 2022-02-03 PROCEDURE — 99214 OFFICE O/P EST MOD 30 MIN: CPT | Mod: ,,, | Performed by: NURSE PRACTITIONER

## 2022-02-03 PROCEDURE — 1160F RVW MEDS BY RX/DR IN RCRD: CPT | Mod: ,,, | Performed by: NURSE PRACTITIONER

## 2022-02-03 PROCEDURE — 83036 HEMOGLOBIN A1C: ICD-10-PCS | Mod: ,,, | Performed by: CLINICAL MEDICAL LABORATORY

## 2022-02-03 PROCEDURE — 85025 CBC WITH DIFFERENTIAL: ICD-10-PCS | Mod: ,,, | Performed by: CLINICAL MEDICAL LABORATORY

## 2022-02-03 PROCEDURE — 3051F HG A1C>EQUAL 7.0%<8.0%: CPT | Mod: ,,, | Performed by: NURSE PRACTITIONER

## 2022-02-03 PROCEDURE — 3074F PR MOST RECENT SYSTOLIC BLOOD PRESSURE < 130 MM HG: ICD-10-PCS | Mod: ,,, | Performed by: NURSE PRACTITIONER

## 2022-02-03 PROCEDURE — 3288F FALL RISK ASSESSMENT DOCD: CPT | Mod: ,,, | Performed by: NURSE PRACTITIONER

## 2022-02-03 PROCEDURE — 84443 ASSAY THYROID STIM HORMONE: CPT | Mod: ,,, | Performed by: CLINICAL MEDICAL LABORATORY

## 2022-02-03 PROCEDURE — 85025 COMPLETE CBC W/AUTO DIFF WBC: CPT | Mod: ,,, | Performed by: CLINICAL MEDICAL LABORATORY

## 2022-02-03 PROCEDURE — 83036 HEMOGLOBIN GLYCOSYLATED A1C: CPT | Mod: ,,, | Performed by: CLINICAL MEDICAL LABORATORY

## 2022-02-03 PROCEDURE — 1126F AMNT PAIN NOTED NONE PRSNT: CPT | Mod: ,,, | Performed by: NURSE PRACTITIONER

## 2022-02-03 PROCEDURE — 80053 COMPREHEN METABOLIC PANEL: CPT | Mod: ,,, | Performed by: CLINICAL MEDICAL LABORATORY

## 2022-02-03 PROCEDURE — 80061 LIPID PANEL: ICD-10-PCS | Mod: ,,, | Performed by: CLINICAL MEDICAL LABORATORY

## 2022-02-03 PROCEDURE — 1100F PTFALLS ASSESS-DOCD GE2>/YR: CPT | Mod: ,,, | Performed by: NURSE PRACTITIONER

## 2022-02-03 PROCEDURE — 80061 LIPID PANEL: CPT | Mod: ,,, | Performed by: CLINICAL MEDICAL LABORATORY

## 2022-02-03 PROCEDURE — 84443 TSH: ICD-10-PCS | Mod: ,,, | Performed by: CLINICAL MEDICAL LABORATORY

## 2022-02-03 PROCEDURE — 3078F PR MOST RECENT DIASTOLIC BLOOD PRESSURE < 80 MM HG: ICD-10-PCS | Mod: ,,, | Performed by: NURSE PRACTITIONER

## 2022-02-03 PROCEDURE — 3051F PR MOST RECENT HEMOGLOBIN A1C LEVEL 7.0 - < 8.0%: ICD-10-PCS | Mod: ,,, | Performed by: NURSE PRACTITIONER

## 2022-02-03 RX ORDER — LISINOPRIL 5 MG/1
5 TABLET ORAL DAILY
Qty: 90 TABLET | Refills: 1 | Status: SHIPPED | OUTPATIENT
Start: 2022-02-03 | End: 2022-06-27 | Stop reason: SDUPTHER

## 2022-02-03 RX ORDER — METHOCARBAMOL 500 MG/1
500 TABLET, FILM COATED ORAL 2 TIMES DAILY
Qty: 180 TABLET | Refills: 1 | Status: SHIPPED | OUTPATIENT
Start: 2022-02-03 | End: 2022-06-27 | Stop reason: SDUPTHER

## 2022-02-03 RX ORDER — AMITRIPTYLINE HYDROCHLORIDE 25 MG/1
25 TABLET, FILM COATED ORAL NIGHTLY PRN
Qty: 90 TABLET | Refills: 1 | Status: SHIPPED | OUTPATIENT
Start: 2022-02-03 | End: 2022-06-27 | Stop reason: SDUPTHER

## 2022-02-03 RX ORDER — INSULIN DEGLUDEC 100 U/ML
INJECTION, SOLUTION SUBCUTANEOUS
Qty: 12 PEN | Refills: 1 | Status: SHIPPED | OUTPATIENT
Start: 2022-02-03 | End: 2022-08-03 | Stop reason: SDUPTHER

## 2022-02-03 RX ORDER — FUROSEMIDE 40 MG/1
40 TABLET ORAL DAILY
Qty: 90 TABLET | Refills: 1 | Status: SHIPPED | OUTPATIENT
Start: 2022-02-03 | End: 2022-06-27 | Stop reason: SDUPTHER

## 2022-02-03 RX ORDER — ESOMEPRAZOLE MAGNESIUM 40 MG/1
40 CAPSULE, DELAYED RELEASE ORAL DAILY
Qty: 90 CAPSULE | Refills: 1 | Status: SHIPPED | OUTPATIENT
Start: 2022-02-03 | End: 2022-06-27 | Stop reason: SDUPTHER

## 2022-02-03 RX ORDER — MONTELUKAST SODIUM 10 MG/1
10 TABLET ORAL NIGHTLY
Qty: 90 TABLET | Refills: 1 | Status: SHIPPED | OUTPATIENT
Start: 2022-02-03 | End: 2022-06-27 | Stop reason: SDUPTHER

## 2022-02-03 RX ORDER — CARVEDILOL 3.12 MG/1
3.12 TABLET ORAL 2 TIMES DAILY WITH MEALS
Qty: 90 TABLET | Refills: 1 | Status: SHIPPED | OUTPATIENT
Start: 2022-02-03 | End: 2022-06-27 | Stop reason: SDUPTHER

## 2022-02-03 RX ORDER — DULAGLUTIDE 1.5 MG/.5ML
1.5 INJECTION, SOLUTION SUBCUTANEOUS
Qty: 4 PEN | Refills: 3 | Status: SHIPPED | OUTPATIENT
Start: 2022-02-03 | End: 2022-06-27 | Stop reason: SDUPTHER

## 2022-02-03 RX ORDER — SPIRONOLACTONE 25 MG/1
25 TABLET ORAL DAILY
Qty: 90 TABLET | Refills: 1 | Status: SHIPPED | OUTPATIENT
Start: 2022-02-03 | End: 2022-06-27 | Stop reason: SDUPTHER

## 2022-02-03 NOTE — PROGRESS NOTES
Subjective:       Patient ID: Sharee Elizondo is a 82 y.o. female.    Chief Complaint: Follow-up (6 month, htn\dm, reports she is fasting) and Medication Refill    Ms. Elizondo presents to clinic for fasting labs, she had HTN, DM, HLD, hypothyroidism, depression - overall she reports she is doing well, she notes that she recently made dinner for her great grandchildren. She was positive for Covid recently but states she had very mild symptoms.    Review of Systems   Constitutional: Negative for appetite change and unexpected weight change.   Respiratory: Negative.    Cardiovascular: Negative.    Gastrointestinal: Negative.    Genitourinary: Negative.    Musculoskeletal: Negative.    Neurological: Negative.    Psychiatric/Behavioral: Negative for confusion and dysphoric mood.         Objective:      Physical Exam  Vitals and nursing note reviewed.   Constitutional:       Appearance: Normal appearance.   HENT:      Head: Normocephalic and atraumatic.   Eyes:      Pupils: Pupils are equal, round, and reactive to light.   Cardiovascular:      Rate and Rhythm: Normal rate and regular rhythm.      Pulses: Normal pulses.      Heart sounds: Normal heart sounds.   Pulmonary:      Effort: Pulmonary effort is normal.      Breath sounds: Normal breath sounds.   Abdominal:      General: Bowel sounds are normal.      Palpations: Abdomen is soft.   Musculoskeletal:         General: Normal range of motion.      Cervical back: Normal range of motion.   Skin:     General: Skin is warm and dry.   Neurological:      Mental Status: She is alert and oriented to person, place, and time.   Psychiatric:         Behavior: Behavior normal.         Assessment:       Problem List Items Addressed This Visit    None     Visit Diagnoses     Hypertension, unspecified type    -  Primary    Depression, unspecified depression type        Diabetes mellitus without complication        Relevant Medications    insulin degludec (TRESIBA FLEXTOUCH U-100) 100 unit/mL  (3 mL) insulin pen    dulaglutide (TRULICITY) 1.5 mg/0.5 mL pen injector    Dyslipidemia        Hypothyroidism, unspecified type        Controlled type 2 diabetes mellitus without complication, without long-term current use of insulin        Relevant Medications    insulin degludec (TRESIBA FLEXTOUCH U-100) 100 unit/mL (3 mL) insulin pen    dulaglutide (TRULICITY) 1.5 mg/0.5 mL pen injector          Plan:        1. Discussed mammogram results, benign repeat in one year.   2. Fasting labs today, we will notify you of results.   3. Medication refills, 3 month follow up .

## 2022-02-09 ENCOUNTER — TELEPHONE (OUTPATIENT)
Dept: FAMILY MEDICINE | Facility: CLINIC | Age: 82
End: 2022-02-09
Payer: COMMERCIAL

## 2022-02-09 NOTE — TELEPHONE ENCOUNTER
----- Message from JOSELITO Mora sent at 2/7/2022  4:55 PM CST -----  TSH is elevated, cholesterol very high - is she taking statin medication? May need appt. To address and change levothyroxine dose.

## 2022-02-10 ENCOUNTER — OFFICE VISIT (OUTPATIENT)
Dept: FAMILY MEDICINE | Facility: CLINIC | Age: 82
End: 2022-02-10
Payer: COMMERCIAL

## 2022-02-10 VITALS
WEIGHT: 150 LBS | HEART RATE: 83 BPM | TEMPERATURE: 99 F | OXYGEN SATURATION: 98 % | RESPIRATION RATE: 18 BRPM | HEIGHT: 63 IN | BODY MASS INDEX: 26.58 KG/M2 | SYSTOLIC BLOOD PRESSURE: 110 MMHG | DIASTOLIC BLOOD PRESSURE: 60 MMHG

## 2022-02-10 DIAGNOSIS — L81.9 DISCOLORATION OF SKIN: ICD-10-CM

## 2022-02-10 DIAGNOSIS — E78.5 DYSLIPIDEMIA: ICD-10-CM

## 2022-02-10 DIAGNOSIS — I10 HYPERTENSION, UNSPECIFIED TYPE: ICD-10-CM

## 2022-02-10 DIAGNOSIS — E03.9 HYPOTHYROIDISM, UNSPECIFIED TYPE: ICD-10-CM

## 2022-02-10 DIAGNOSIS — I25.10 CORONARY ARTERY DISEASE, UNSPECIFIED VESSEL OR LESION TYPE, UNSPECIFIED WHETHER ANGINA PRESENT, UNSPECIFIED WHETHER NATIVE OR TRANSPLANTED HEART: ICD-10-CM

## 2022-02-10 DIAGNOSIS — Z85.820 H/O MALIGNANT MELANOMA OF SKIN: ICD-10-CM

## 2022-02-10 DIAGNOSIS — E78.1 HYPERTRIGLYCERIDEMIA: Primary | ICD-10-CM

## 2022-02-10 PROCEDURE — 99213 PR OFFICE/OUTPT VISIT, EST, LEVL III, 20-29 MIN: ICD-10-PCS | Mod: ,,, | Performed by: NURSE PRACTITIONER

## 2022-02-10 PROCEDURE — 1160F RVW MEDS BY RX/DR IN RCRD: CPT | Mod: ,,, | Performed by: NURSE PRACTITIONER

## 2022-02-10 PROCEDURE — 3078F DIAST BP <80 MM HG: CPT | Mod: ,,, | Performed by: NURSE PRACTITIONER

## 2022-02-10 PROCEDURE — 3288F PR FALLS RISK ASSESSMENT DOCUMENTED: ICD-10-PCS | Mod: ,,, | Performed by: NURSE PRACTITIONER

## 2022-02-10 PROCEDURE — 99213 OFFICE O/P EST LOW 20 MIN: CPT | Mod: ,,, | Performed by: NURSE PRACTITIONER

## 2022-02-10 PROCEDURE — 1100F PR PT FALLS ASSESS DOC 2+ FALLS/FALL W/INJURY/YR: ICD-10-PCS | Mod: ,,, | Performed by: NURSE PRACTITIONER

## 2022-02-10 PROCEDURE — 3288F FALL RISK ASSESSMENT DOCD: CPT | Mod: ,,, | Performed by: NURSE PRACTITIONER

## 2022-02-10 PROCEDURE — 1126F PR PAIN SEVERITY QUANTIFIED, NO PAIN PRESENT: ICD-10-PCS | Mod: ,,, | Performed by: NURSE PRACTITIONER

## 2022-02-10 PROCEDURE — 1160F PR REVIEW ALL MEDS BY PRESCRIBER/CLIN PHARMACIST DOCUMENTED: ICD-10-PCS | Mod: ,,, | Performed by: NURSE PRACTITIONER

## 2022-02-10 PROCEDURE — 1159F PR MEDICATION LIST DOCUMENTED IN MEDICAL RECORD: ICD-10-PCS | Mod: ,,, | Performed by: NURSE PRACTITIONER

## 2022-02-10 PROCEDURE — 3074F SYST BP LT 130 MM HG: CPT | Mod: ,,, | Performed by: NURSE PRACTITIONER

## 2022-02-10 PROCEDURE — 1126F AMNT PAIN NOTED NONE PRSNT: CPT | Mod: ,,, | Performed by: NURSE PRACTITIONER

## 2022-02-10 PROCEDURE — 1159F MED LIST DOCD IN RCRD: CPT | Mod: ,,, | Performed by: NURSE PRACTITIONER

## 2022-02-10 PROCEDURE — 3074F PR MOST RECENT SYSTOLIC BLOOD PRESSURE < 130 MM HG: ICD-10-PCS | Mod: ,,, | Performed by: NURSE PRACTITIONER

## 2022-02-10 PROCEDURE — 3078F PR MOST RECENT DIASTOLIC BLOOD PRESSURE < 80 MM HG: ICD-10-PCS | Mod: ,,, | Performed by: NURSE PRACTITIONER

## 2022-02-10 PROCEDURE — 1100F PTFALLS ASSESS-DOCD GE2>/YR: CPT | Mod: ,,, | Performed by: NURSE PRACTITIONER

## 2022-02-10 RX ORDER — CARVEDILOL 3.12 MG/1
3.12 TABLET ORAL 2 TIMES DAILY WITH MEALS
Qty: 90 TABLET | Refills: 1 | Status: CANCELLED | OUTPATIENT
Start: 2022-02-10

## 2022-02-10 RX ORDER — ICOSAPENT ETHYL 500 MG/1
1 CAPSULE ORAL 2 TIMES DAILY
Qty: 180 CAPSULE | Refills: 1 | Status: SHIPPED | OUTPATIENT
Start: 2022-02-10 | End: 2022-06-27 | Stop reason: SDUPTHER

## 2022-02-10 RX ORDER — AMITRIPTYLINE HYDROCHLORIDE 25 MG/1
25 TABLET, FILM COATED ORAL NIGHTLY PRN
Qty: 90 TABLET | Refills: 1 | Status: CANCELLED | OUTPATIENT
Start: 2022-02-10

## 2022-02-10 RX ORDER — ROSUVASTATIN CALCIUM 40 MG/1
40 TABLET, COATED ORAL NIGHTLY
Qty: 90 TABLET | Refills: 1 | Status: SHIPPED | OUTPATIENT
Start: 2022-02-10 | End: 2022-06-27 | Stop reason: SDUPTHER

## 2022-02-10 RX ORDER — SPIRONOLACTONE 25 MG/1
25 TABLET ORAL DAILY
Qty: 90 TABLET | Refills: 1 | Status: CANCELLED | OUTPATIENT
Start: 2022-02-10

## 2022-02-10 RX ORDER — ESOMEPRAZOLE MAGNESIUM 40 MG/1
40 CAPSULE, DELAYED RELEASE ORAL DAILY
Qty: 90 CAPSULE | Refills: 1 | Status: CANCELLED | OUTPATIENT
Start: 2022-02-10

## 2022-02-10 RX ORDER — LISINOPRIL 5 MG/1
5 TABLET ORAL DAILY
Qty: 90 TABLET | Refills: 1 | Status: CANCELLED | OUTPATIENT
Start: 2022-02-10

## 2022-02-10 NOTE — PROGRESS NOTES
Subjective:       Patient ID: Sharee Elizondo is a 82 y.o. female.    Chief Complaint: Results (Lab\medication discussion)    Ms. Elizondo presents in follow up for recent labs, TSH was slightly elevated, also her triglycerides and LDL are significantly elevated from last check. Trigs have trended downward in last years but LDL is 200. She has a h/o chronic ischemic heart disease and we will start Vascepa and increase rosuvastatin to 40mg. She is not currently taking levothyroxine on an empty stomach and 30mins prior to other meds, she will adjust this and repeat labs in 3 months. She needs to have esophageal stretching procedure and will call to schedule with Dr. Long. She also had a melanoma removed from the top of her scalp, they have noticed some of the skin turning black in the scar area, will refer to dermatology for biopsy.    Review of Systems   Constitutional: Negative.    Respiratory: Negative.    Gastrointestinal: Negative.    Genitourinary: Negative.    Integumentary:  Positive for color change (scalp) and mole/lesion.   Neurological: Negative.    Psychiatric/Behavioral: Negative.          Objective:      Physical Exam  Vitals and nursing note reviewed.   Constitutional:       Appearance: Normal appearance.   HENT:      Head: Normocephalic.     Eyes:      Pupils: Pupils are equal, round, and reactive to light.   Pulmonary:      Effort: Pulmonary effort is normal.   Skin:     General: Skin is warm and dry.      Findings: Lesion present.   Neurological:      Mental Status: She is alert and oriented to person, place, and time.   Psychiatric:         Behavior: Behavior normal.         Assessment:       Problem List Items Addressed This Visit    None     Visit Diagnoses     Hypertriglyceridemia    -  Primary    Hypertension, unspecified type        H/O malignant melanoma of skin        Relevant Orders    Ambulatory referral/consult to Dermatology    Discoloration of skin        Relevant Orders    Ambulatory  referral/consult to Dermatology    Dyslipidemia        Coronary artery disease, unspecified vessel or lesion type, unspecified whether angina present, unspecified whether native or transplanted heart        Hypothyroidism, unspecified type              Plan:        1. Hypothyroidism - mild elevation of TSH, take levothyroxine first thing on an empty stomach and wait 30 mins before taking meds or eating.   2. Dyslipidemia and hypertriglyceridemia - she appears to be taking rosuvastatin 20mg daily - increase to 40mg QD. Start vascepa 0.5gm BID.   3. Derm referral due to h/o melanoma on scalp, multiple black lesions on scar where cancer was removed.   4. Repeat labs in 3 months.

## 2022-02-16 DIAGNOSIS — Z11.59 SPECIAL SCREENING EXAMINATION FOR VIRAL DISEASE: Primary | ICD-10-CM

## 2022-02-16 DIAGNOSIS — R13.10 DYSPHAGIA: Primary | ICD-10-CM

## 2022-02-18 ENCOUNTER — HOSPITAL ENCOUNTER (OUTPATIENT)
Dept: GASTROENTEROLOGY | Facility: HOSPITAL | Age: 82
Discharge: HOME | End: 2022-02-18
Attending: INTERNAL MEDICINE
Payer: COMMERCIAL

## 2022-02-18 ENCOUNTER — ANESTHESIA (OUTPATIENT)
Dept: GASTROENTEROLOGY | Facility: HOSPITAL | Age: 82
End: 2022-02-18
Payer: COMMERCIAL

## 2022-02-18 ENCOUNTER — ANESTHESIA EVENT (OUTPATIENT)
Dept: GASTROENTEROLOGY | Facility: HOSPITAL | Age: 82
End: 2022-02-18
Payer: COMMERCIAL

## 2022-02-18 VITALS
DIASTOLIC BLOOD PRESSURE: 53 MMHG | SYSTOLIC BLOOD PRESSURE: 96 MMHG | WEIGHT: 150 LBS | HEIGHT: 63 IN | BODY MASS INDEX: 26.58 KG/M2 | RESPIRATION RATE: 18 BRPM | TEMPERATURE: 97 F | HEART RATE: 71 BPM | OXYGEN SATURATION: 97 %

## 2022-02-18 DIAGNOSIS — R13.10 DYSPHAGIA: ICD-10-CM

## 2022-02-18 DIAGNOSIS — R13.19 ESOPHAGEAL DYSPHAGIA: ICD-10-CM

## 2022-02-18 DIAGNOSIS — K29.00 ACUTE SUPERFICIAL GASTRITIS WITHOUT HEMORRHAGE: ICD-10-CM

## 2022-02-18 DIAGNOSIS — K44.9 HH (HIATUS HERNIA): ICD-10-CM

## 2022-02-18 PROCEDURE — D9220A PRA ANESTHESIA: ICD-10-PCS | Mod: ,,, | Performed by: NURSE ANESTHETIST, CERTIFIED REGISTERED

## 2022-02-18 PROCEDURE — 25000003 PHARM REV CODE 250: Performed by: NURSE ANESTHETIST, CERTIFIED REGISTERED

## 2022-02-18 PROCEDURE — 88341 IMHCHEM/IMCYTCHM EA ADD ANTB: CPT | Mod: 26,,, | Performed by: PATHOLOGY

## 2022-02-18 PROCEDURE — 88341 SURGICAL PATHOLOGY: ICD-10-PCS | Mod: 26,,, | Performed by: PATHOLOGY

## 2022-02-18 PROCEDURE — 88342 SURGICAL PATHOLOGY: ICD-10-PCS | Mod: 26,,, | Performed by: PATHOLOGY

## 2022-02-18 PROCEDURE — 88342 IMHCHEM/IMCYTCHM 1ST ANTB: CPT | Mod: 26,,, | Performed by: PATHOLOGY

## 2022-02-18 PROCEDURE — 88305 SURGICAL PATHOLOGY: ICD-10-PCS | Mod: 26,XU,, | Performed by: PATHOLOGY

## 2022-02-18 PROCEDURE — D9220A PRA ANESTHESIA: Mod: ,,, | Performed by: NURSE ANESTHETIST, CERTIFIED REGISTERED

## 2022-02-18 PROCEDURE — 43239 EGD BIOPSY SINGLE/MULTIPLE: CPT

## 2022-02-18 PROCEDURE — 37000008 HC ANESTHESIA 1ST 15 MINUTES

## 2022-02-18 PROCEDURE — 88305 TISSUE EXAM BY PATHOLOGIST: CPT | Mod: 26,XU,, | Performed by: PATHOLOGY

## 2022-02-18 PROCEDURE — 63600175 PHARM REV CODE 636 W HCPCS: Performed by: NURSE ANESTHETIST, CERTIFIED REGISTERED

## 2022-02-18 PROCEDURE — 88305 TISSUE EXAM BY PATHOLOGIST: CPT | Mod: SUR | Performed by: INTERNAL MEDICINE

## 2022-02-18 PROCEDURE — 43450 DILATE ESOPHAGUS 1/MULT PASS: CPT

## 2022-02-18 PROCEDURE — 27201423 OPTIME MED/SURG SUP & DEVICES STERILE SUPPLY

## 2022-02-18 RX ORDER — PROPOFOL 10 MG/ML
VIAL (ML) INTRAVENOUS
Status: DISCONTINUED | OUTPATIENT
Start: 2022-02-18 | End: 2022-02-18

## 2022-02-18 RX ORDER — LIDOCAINE HYDROCHLORIDE 20 MG/ML
INJECTION, SOLUTION EPIDURAL; INFILTRATION; INTRACAUDAL; PERINEURAL
Status: DISCONTINUED | OUTPATIENT
Start: 2022-02-18 | End: 2022-02-18

## 2022-02-18 RX ORDER — SODIUM CHLORIDE 0.9 % (FLUSH) 0.9 %
10 SYRINGE (ML) INJECTION
Status: CANCELLED | OUTPATIENT
Start: 2022-02-18

## 2022-02-18 RX ADMIN — SODIUM CHLORIDE: 9 INJECTION, SOLUTION INTRAVENOUS at 01:02

## 2022-02-18 RX ADMIN — PROPOFOL 75 MG: 10 INJECTION, EMULSION INTRAVENOUS at 01:02

## 2022-02-18 RX ADMIN — LIDOCAINE HYDROCHLORIDE 75 MG: 20 INJECTION, SOLUTION INTRAVENOUS at 01:02

## 2022-02-18 NOTE — ANESTHESIA PREPROCEDURE EVALUATION
02/18/2022  Sharee Elizondo is a 82 y.o., female.  Medication List with Changes/Refills   Current Medications    AMITRIPTYLINE (ELAVIL) 25 MG TABLET    Take 1 tablet (25 mg total) by mouth nightly as needed for Insomnia.    ASCORBIC ACID, VITAMIN C, (VITAMIN C) 500 MG TABLET    Take 500 mg by mouth once daily.    ASPIRIN (ECOTRIN) 81 MG EC TABLET    Take 81 mg by mouth once daily.    CARVEDILOL (COREG) 3.125 MG TABLET    Take 1 tablet (3.125 mg total) by mouth 2 (two) times daily with meals.    CHOLECALCIFEROL, VITAMIN D3, (VITAMIN D3) 25 MCG (1,000 UNIT) CAPSULE    Take 1,000 Units by mouth once daily.    DULAGLUTIDE (TRULICITY) 1.5 MG/0.5 ML PEN INJECTOR    Inject 1.5 mg into the skin every 7 days.    ESOMEPRAZOLE (NEXIUM) 40 MG CAPSULE    Take 1 capsule (40 mg total) by mouth once daily.    FUROSEMIDE (LASIX) 40 MG TABLET    Take 1 tablet (40 mg total) by mouth once daily.    GABAPENTIN (NEURONTIN) 100 MG CAPSULE    Take 1 capsule (100 mg total) by mouth every 8 (eight) hours.    HYDROCODONE-ACETAMINOPHEN (NORCO) 5-325 MG PER TABLET    Take 1 tablet by mouth every 8 (eight) hours.    HYDROCODONE-ACETAMINOPHEN (NORCO) 5-325 MG PER TABLET    Take 1 tablet by mouth every 8 (eight) hours.    ICOSAPENT ETHYL (VASCEPA) 0.5 GRAM CAP    Take 1 capsule by mouth 2 (two) times a day.    INSULIN DEGLUDEC (TRESIBA FLEXTOUCH U-100) 100 UNIT/ML (3 ML) INSULIN PEN    Inject 10 units into skin daily    LEVOTHYROXINE (SYNTHROID) 25 MCG TABLET    Take 25 mcg by mouth before breakfast.    LISINOPRIL (PRINIVIL,ZESTRIL) 5 MG TABLET    Take 1 tablet (5 mg total) by mouth once daily.    MELATONIN, BULK, MISC    by Misc.(Non-Drug; Combo Route) route.    METHOCARBAMOL (ROBAXIN) 500 MG TAB    Take 1 tablet (500 mg total) by mouth 2 (two) times daily.    METOCLOPRAMIDE HCL (REGLAN) 10 MG TABLET    TAKE 1 TABLET BY MOUTH 3 TIMES A DAY AS  NEEDED    MONTELUKAST (SINGULAIR) 10 MG TABLET    Take 1 tablet (10 mg total) by mouth every evening.    NITROGLYCERIN (NITROSTAT) 0.4 MG SL TABLET    Place 0.4 mg under the tongue every 5 (five) minutes as needed.    ROSUVASTATIN (CRESTOR) 40 MG TAB    Take 1 tablet (40 mg total) by mouth every evening.    SERTRALINE (ZOLOFT) 50 MG TABLET    Take 1 tablet (50 mg total) by mouth once daily.    SPIRONOLACTONE (ALDACTONE) 25 MG TABLET    Take 1 tablet (25 mg total) by mouth once daily.     Lab Results   Component Value Date    WBC 8.56 02/03/2022    HGB 13.4 02/03/2022    HCT 40.0 02/03/2022    MCV 89.7 02/03/2022     02/03/2022         Active Ambulatory Problems     Diagnosis Date Noted    Spondylosis of cervical joint without myelopathy 08/05/2021    Spondylosis of cervical region without myelopathy or radiculopathy 08/24/2021    Lumbar radiculopathy     Osteoarthrosis multiple sites, not specified as generalized      Resolved Ambulatory Problems     Diagnosis Date Noted    No Resolved Ambulatory Problems     Past Medical History:   Diagnosis Date    Atrial fibrillation     Automatic implantable cardiac defibrillator in situ     Bilateral primary osteoarthritis of knee     Cervical spondylosis without myelopathy     CHF (congestive heart failure)     Chronic ischemic heart disease     Chronic kidney disease (CKD), stage III (moderate)     Chronic pain syndrome     COPD (chronic obstructive pulmonary disease)     Coronary arteriosclerosis     Depressive disorder     GERD (gastroesophageal reflux disease)     Hypothyroidism     Low back pain     Metabolic syndrome     Myocardial ischemia     Other long term (current) drug therapy     Radiculopathy, cervical region     Type 2 diabetes mellitus      AICD    Anesthesia Evaluation          Review of Systems  Cardiovascular:   Pacemaker CAD   CHF Orthopnea    Pulmonary:   COPD    Renal/:   Chronic Renal Disease    Hepatic/GI:   GERD     Musculoskeletal:   Arthritis     Neurological:   Neuromuscular Disease,    Endocrine:   Diabetes, well controlled Hypothyroidism    Psych:   Psychiatric History          Physical Exam  General:  Well nourished       Chest/Lungs:  Chest/Lungs Findings: Normal Respiratory Rate     Heart/Vascular:  Heart Findings: Rate: Normal             Anesthesia Plan  Type of Anesthesia, risks & benefits discussed:  Anesthesia Type:  MAC, general    Patient's Preference:   Plan Factors:          Intra-op Monitoring Plan: standard ASA monitors  Intra-op Monitoring Plan Comments:   Post Op Pain Control Plan: per primary service following discharge from PACU  Post Op Pain Control Plan Comments:     Induction:   IV  Beta Blocker:         Informed Consent: Patient understands risks and agrees with Anesthesia plan.  Questions answered. Anesthesia consent signed with patient.  ASA Score: 4     Day of Surgery Review of History & Physical: I have interviewed and examined the patient. I have reviewed the patient's H&P dated:  There are no significant changes.          Ready For Surgery From Anesthesia Perspective.

## 2022-02-18 NOTE — ANESTHESIA POSTPROCEDURE EVALUATION
Anesthesia Post Evaluation    Patient: Sharee Elizondo    Procedure(s) Performed: *EGD  Final Anesthesia Type: general      Patient location during evaluation: GI PACU  Patient participation: Yes- Able to Participate  Level of consciousness: awake and alert  Post-procedure vital signs: reviewed and stable  Pain management: adequate  Airway patency: patent    PONV status at discharge: No PONV  Anesthetic complications: no      Cardiovascular status: blood pressure returned to baseline and hemodynamically stable  Respiratory status: spontaneous ventilation  Hydration status: euvolemic  Follow-up not needed.  Comments: Pt voices appreciation for care          Vitals Value Taken Time   BP 96/53 02/18/22 1433   Temp 97f 02/18/22 1505   Pulse 69 02/18/22 1433   Resp 18 02/18/22 1433   SpO2 95 % 02/18/22 1430   Vitals shown include unvalidated device data.      Event Time   Out of Recovery 14:45:00         Pain/Christiano Score: Christiano Score: 10 (2/18/2022  2:33 PM)        
CT pending/ walk trial if CT ok

## 2022-02-18 NOTE — DISCHARGE INSTRUCTIONS
Impression  Overall Impression: Mucosa of the esophagus was normal. There appeared to be esophageal spasm. The distal esophagus was biopsied and then dilated with a 48F Bejarano. A small hiatus hernia was present. The stomach had gastritis without ulcers, biopsies were obtained. Mucosa of the duodenum was normal.     Recommendation  Await pathology results; continue ppi; repeat dilation prn    DO NOT DRIVE FOR 24 HOURS OR OPERATE HEAVY MACHINERY.   DO NOT SIGN LEGAL DOCUMENTS.  DRINK PLENTY OF FLUIDS. RESUME DIET.

## 2022-02-18 NOTE — H&P
[facility]  Gastroenterology  H&P    Patient Name: Sharee Elizondo  MRN: [id number]  Admission Date: 2/18/2022  Code Status: No Order    Attending Provider: Michael Long MD  Primary Care Physician: JOSELITO Mora  Principal Problem:<principal problem not specified>    Subjective:     History of Present Illness: Pt has esophageal dysphagia; for egd with dilation.    Past Medical History:   Diagnosis Date    Atrial fibrillation     Automatic implantable cardiac defibrillator in situ     Bilateral primary osteoarthritis of knee     Cervical spondylosis without myelopathy     CHF (congestive heart failure)     Chronic ischemic heart disease     Chronic kidney disease (CKD), stage III (moderate)     Chronic pain syndrome     COPD (chronic obstructive pulmonary disease)     Coronary arteriosclerosis     Depressive disorder     GERD (gastroesophageal reflux disease)     Hypothyroidism     Low back pain     Lumbar radiculopathy     Metabolic syndrome     Myocardial ischemia     Other long term (current) drug therapy     Radiculopathy, cervical region     Type 2 diabetes mellitus        Past Surgical History:   Procedure Laterality Date    BACK SURGERY      bilateral knee injection/ aspiration of joint/ bursa-large  Bilateral 4/4/2017 3/27/2017 3/20/2017    bilateral knee orthovisc injection    CARDIAC SURGERY      EPIDURAL STEROID INJECTION INTO CERVICAL SPINE  6/19/2019 5/17/2017 4/26/2017    C6-7 LONNIE, Dr Farah    EPIDURAL STEROID INJECTION INTO LUMBAR SPINE  03/15/2018    L4-5 LONNIE, Dr Farah    HYSTERECTOMY      INJECTION OF FACET JOINT Bilateral 8/17/2017 7/19/2017    Bilateral C3-7 FI, Dr Farah    INJECTION OF FACET JOINT Left 10/18/2018    left C3-7 FI, Dr Farah    INJECTION OF FACET JOINT Bilateral 8/22/2018 6/29/2018    bilateral L3-S1 FI, Dr Farah    RADIOFREQUENCY ABLATION Right 12/12/2018    Right C3-7 RF, Dr Farah    RADIOFREQUENCY ABLATION Left 01/9/2019  10/16/2017 10/7/2020    Left C3-7 RF, Dr Farah    RADIOFREQUENCY ABLATION Left 1/9/2019 10/16/2017    left C3-7 RF, Dr Farah    RADIOFREQUENCY THERMAL COAGULATION OF MEDIAL BRANCH OF POSTERIOR RAMUS OF CERVICAL SPINAL NERVE Left 8/5/2021    Procedure: RADIOFREQUENCY THERMAL COAGULATION, NERVE, SPINAL, CERVICAL, POSTERIOR RAMUS, MEDIAL BRANCH, Left C3-4,4-5  only 2 levels resubmitted dt denied all levels on 8-3-2021;  Surgeon: Angie Farah MD;  Location: [facility];  Service: Pain Management;  Laterality: Left;    RADIOFREQUENCY THERMAL COAGULATION OF MEDIAL BRANCH OF POSTERIOR RAMUS OF CERVICAL SPINAL NERVE Right 8/24/2021    Procedure: RADIOFREQUENCY THERMAL COAGULATION, NERVE, SPINAL, CERVICAL, POSTERIOR RAMUS, MEDIAL BRANCH RIGHT C3-C5 RFTC;  Surgeon: Angie Farah MD;  Location: [facility];  Service: Pain Management;  Laterality: Right;  HAD VAC WILL BRING CARD    TRANSFORAMINAL EPIDURAL INJECTION OF STEROID Right 5/30/2018 5/2/2018    Right L4-5 TFESI, Dr Farah       Review of patient's allergies indicates:  No Known Allergies  Family History     Problem Relation (Age of Onset)    Diabetes Mellitus Mother    Hypertension Mother        Tobacco Use    Smoking status: Current Every Day Smoker     Types: Cigarettes    Smokeless tobacco: Never Used   Substance and Sexual Activity    Alcohol use: Not Currently    Drug use: Never    Sexual activity: Not on file     Review of Systems   Respiratory: Negative.    Cardiovascular: Negative.    Gastrointestinal: Negative.      Objective:     Vital Signs (Most Recent):  Temp: 97.6 °F (36.4 °C) (02/18/22 1230)  Pulse: 86 (02/18/22 1230)  Resp: 12 (02/18/22 1230)  BP: 126/68 (02/18/22 1230)  SpO2: 96 % (02/18/22 1230) Vital Signs (24h Range):  Temp:  [97.6 °F (36.4 °C)] 97.6 °F (36.4 °C)  Pulse:  [86] 86  Resp:  [12] 12  SpO2:  [96 %] 96 %  BP: (126)/(68) 126/68     Weight: 68 kg (150 lb) (02/18/22 1230)  Body mass index is 26.57 kg/m².    No  intake or output data in the 24 hours ending 02/18/22 1349    Lines/Drains/Airways     Peripheral Intravenous Line                 Peripheral IV - Single Lumen 02/18/22 1232 22 G Right Antecubital <1 day                Physical Exam  Vitals reviewed.   Constitutional:       General: She is not in acute distress.     Appearance: Normal appearance. She is well-developed. She is not ill-appearing.   HENT:      Head: Normocephalic and atraumatic.      Nose: Nose normal.   Eyes:      Pupils: Pupils are equal, round, and reactive to light.   Cardiovascular:      Rate and Rhythm: Normal rate and regular rhythm.   Pulmonary:      Effort: Pulmonary effort is normal.      Breath sounds: Normal breath sounds. No wheezing.   Abdominal:      General: Abdomen is flat. Bowel sounds are normal. There is no distension.      Palpations: Abdomen is soft.      Tenderness: There is no abdominal tenderness. There is no guarding.   Skin:     General: Skin is warm and dry.      Coloration: Skin is not jaundiced.   Neurological:      Mental Status: She is alert.   Psychiatric:         Attention and Perception: Attention normal.         Mood and Affect: Affect normal.         Speech: Speech normal.         Behavior: Behavior is cooperative.      Comments: Pt was calm while speaking.         Significant Labs:  CBC: No results for input(s): WBC, HGB, HCT, PLT in the last 48 hours.  CMP: No results for input(s): GLU, CALCIUM, ALBUMIN, PROT, NA, K, CO2, CL, BUN, CREATININE, ALKPHOS, ALT, AST, BILITOT in the last 48 hours.    Significant Imaging:  Imaging results within the past 24 hours have been reviewed.    Assessment/Plan:     There are no hospital problems to display for this patient.        Imp: esophageal dysphagia  Plan: egd with dilation    Michael Long MD  Gastroenterology  [facility]

## 2022-02-18 NOTE — TRANSFER OF CARE
"Anesthesia Transfer of Care Note    Patient: Sharee Elizondo    Procedure(s) Performed: egd dilation  Patient location: GI    Anesthesia Type: general    Transport from OR: Transported from OR on room air with adequate spontaneous ventilation. Continuous ECG monitoring in transport. Continuous SpO2 monitoring in transport    Post pain: adequate analgesia    Post assessment: no apparent anesthetic complications    Post vital signs: stable    Level of consciousness: sedated and responds to stimulation    Nausea/Vomiting: no nausea/vomiting    Complications: none    Transfer of care protocol was followedComments: Good SV continue, NAD, VSS, RTRN      Last vitals:   Visit Vitals  /71 (BP Location: Right arm)   Pulse 92   Temp 36.1 °C (97 °F)   Resp 18   Ht 5' 3" (1.6 m)   Wt 68 kg (150 lb)   SpO2 99%   Breastfeeding No   BMI 26.57 kg/m²     "

## 2022-02-21 LAB
DHEA SERPL-MCNC: NORMAL
ESTROGEN SERPL-MCNC: NORMAL PG/ML
INSULIN SERPL-ACNC: NORMAL U[IU]/ML
LAB AP GROSS DESCRIPTION: NORMAL
LAB AP LABORATORY NOTES: NORMAL
T3RU NFR SERPL: NORMAL %

## 2022-02-22 ENCOUNTER — TELEPHONE (OUTPATIENT)
Dept: GASTROENTEROLOGY | Facility: CLINIC | Age: 82
End: 2022-02-22
Payer: COMMERCIAL

## 2022-02-22 NOTE — TELEPHONE ENCOUNTER
Attempted to call results with no answer. Left message for patient to call back.      ----- Message from Michael Long MD sent at 2/21/2022  4:33 PM CST -----  EGD bx shows gastritis w/o H.pylori.

## 2022-02-23 ENCOUNTER — TELEPHONE (OUTPATIENT)
Dept: GASTROENTEROLOGY | Facility: CLINIC | Age: 82
End: 2022-02-23
Payer: COMMERCIAL

## 2022-02-23 NOTE — TELEPHONE ENCOUNTER
Called EGD path results. Patient verbalized good understanding. Patient denies dysphagia and states she will call if she has any problems.      ----- Message from Michael Long MD sent at 2/21/2022  4:33 PM CST -----  EGD bx shows gastritis w/o H.pylori.

## 2022-03-11 ENCOUNTER — OFFICE VISIT (OUTPATIENT)
Dept: DERMATOLOGY | Facility: CLINIC | Age: 82
End: 2022-03-11
Payer: COMMERCIAL

## 2022-03-11 VITALS — BODY MASS INDEX: 26.58 KG/M2 | RESPIRATION RATE: 18 BRPM | WEIGHT: 150 LBS | HEIGHT: 63 IN

## 2022-03-11 DIAGNOSIS — L81.9 DISCOLORATION OF SKIN: ICD-10-CM

## 2022-03-11 DIAGNOSIS — Z85.820 H/O MALIGNANT MELANOMA OF SKIN: ICD-10-CM

## 2022-03-11 DIAGNOSIS — L85.9 HYPERKERATOSIS: ICD-10-CM

## 2022-03-11 DIAGNOSIS — L82.1 SEBORRHEIC KERATOSES: ICD-10-CM

## 2022-03-11 DIAGNOSIS — L57.0 ACTINIC KERATOSES: Primary | ICD-10-CM

## 2022-03-11 PROCEDURE — 1101F PT FALLS ASSESS-DOCD LE1/YR: CPT | Mod: CPTII,,, | Performed by: STUDENT IN AN ORGANIZED HEALTH CARE EDUCATION/TRAINING PROGRAM

## 2022-03-11 PROCEDURE — 17000 DESTRUCT PREMALG LESION: CPT | Mod: ,,, | Performed by: STUDENT IN AN ORGANIZED HEALTH CARE EDUCATION/TRAINING PROGRAM

## 2022-03-11 PROCEDURE — 99203 PR OFFICE/OUTPT VISIT, NEW, LEVL III, 30-44 MIN: ICD-10-PCS | Mod: 25,,, | Performed by: STUDENT IN AN ORGANIZED HEALTH CARE EDUCATION/TRAINING PROGRAM

## 2022-03-11 PROCEDURE — 17003 DESTRUCT PREMALG LES 2-14: CPT | Mod: ,,, | Performed by: STUDENT IN AN ORGANIZED HEALTH CARE EDUCATION/TRAINING PROGRAM

## 2022-03-11 PROCEDURE — 1126F PR PAIN SEVERITY QUANTIFIED, NO PAIN PRESENT: ICD-10-PCS | Mod: CPTII,,, | Performed by: STUDENT IN AN ORGANIZED HEALTH CARE EDUCATION/TRAINING PROGRAM

## 2022-03-11 PROCEDURE — 17003 DESTRUCTION, PREMALIGNANT LESIONS; SECOND THROUGH 14 LESIONS: ICD-10-PCS | Mod: ,,, | Performed by: STUDENT IN AN ORGANIZED HEALTH CARE EDUCATION/TRAINING PROGRAM

## 2022-03-11 PROCEDURE — 3288F FALL RISK ASSESSMENT DOCD: CPT | Mod: CPTII,,, | Performed by: STUDENT IN AN ORGANIZED HEALTH CARE EDUCATION/TRAINING PROGRAM

## 2022-03-11 PROCEDURE — 3288F PR FALLS RISK ASSESSMENT DOCUMENTED: ICD-10-PCS | Mod: CPTII,,, | Performed by: STUDENT IN AN ORGANIZED HEALTH CARE EDUCATION/TRAINING PROGRAM

## 2022-03-11 PROCEDURE — 1159F MED LIST DOCD IN RCRD: CPT | Mod: CPTII,,, | Performed by: STUDENT IN AN ORGANIZED HEALTH CARE EDUCATION/TRAINING PROGRAM

## 2022-03-11 PROCEDURE — 17000 PR DESTRUCTION(LASER SURGERY,CRYOSURGERY,CHEMOSURGERY),PREMALIGNANT LESIONS,FIRST LESION: ICD-10-PCS | Mod: ,,, | Performed by: STUDENT IN AN ORGANIZED HEALTH CARE EDUCATION/TRAINING PROGRAM

## 2022-03-11 PROCEDURE — 99203 OFFICE O/P NEW LOW 30 MIN: CPT | Mod: 25,,, | Performed by: STUDENT IN AN ORGANIZED HEALTH CARE EDUCATION/TRAINING PROGRAM

## 2022-03-11 PROCEDURE — 1160F PR REVIEW ALL MEDS BY PRESCRIBER/CLIN PHARMACIST DOCUMENTED: ICD-10-PCS | Mod: CPTII,,, | Performed by: STUDENT IN AN ORGANIZED HEALTH CARE EDUCATION/TRAINING PROGRAM

## 2022-03-11 PROCEDURE — 1159F PR MEDICATION LIST DOCUMENTED IN MEDICAL RECORD: ICD-10-PCS | Mod: CPTII,,, | Performed by: STUDENT IN AN ORGANIZED HEALTH CARE EDUCATION/TRAINING PROGRAM

## 2022-03-11 PROCEDURE — 1126F AMNT PAIN NOTED NONE PRSNT: CPT | Mod: CPTII,,, | Performed by: STUDENT IN AN ORGANIZED HEALTH CARE EDUCATION/TRAINING PROGRAM

## 2022-03-11 PROCEDURE — 1101F PR PT FALLS ASSESS DOC 0-1 FALLS W/OUT INJ PAST YR: ICD-10-PCS | Mod: CPTII,,, | Performed by: STUDENT IN AN ORGANIZED HEALTH CARE EDUCATION/TRAINING PROGRAM

## 2022-03-11 PROCEDURE — 1160F RVW MEDS BY RX/DR IN RCRD: CPT | Mod: CPTII,,, | Performed by: STUDENT IN AN ORGANIZED HEALTH CARE EDUCATION/TRAINING PROGRAM

## 2022-03-11 NOTE — PROGRESS NOTES
[facility]  Otilio Anthony MD    [address] [address]  [phone]    Fax: [phone]    Patient Name: Sharee Elizonod  Medical Record Number: [id number]  PCP: JOSELITO Mora  Age: 82 y.o. : 1940  Contact: [phone] (home)     CC: Scar discoloration  History of Present Illness:     Sharee Elizondo is a 82 y.o.  female with history of skin cancer melanoma of the scalp (unknown Breslow, excised by Dr. Lyon 2019 with [facility]) who presents to clinic today for discoloration of the scar on the scalp.  This has been present for three year. Symptoms include change in color. Previous treatments include none.      The patient has no other concerns today.    Review of Systems:     Unremarkable other than mentioned above.     Physical Exam:     General: Relaxed, oriented, alert    Skin examination of the scalp, face, neck, chest, back, abdomen, upper extremities and lower extremities were normal except for as listed below      Assessment and Plan:     1. History of malignant melanoma  - well healed scar with NER. There appears to abundant hyperkeratosis around the scar in damaged hair follicles     Plan: Counseling  Patients with a history of melanoma should wear broad spectrum sunscreen and sun protective clothing.  Ecars from excisional sites of melanoma should be monitored for any recurrences. Monthly self-skin checks should be performed to monitor for any moles that change in size, shape or color, itch burn or bleed.  Contact Office if: Patient notices any new or changing moles, develops constitutional symptoms or develops new lesions within or around the previous melanoma scar.      2. Actinic Keratoses  Erythematous, scaly papules on right hand, right fourth digit, left forearm     Plan: Liquid Nitrogen.  A total of 6 lesions were treated with liquid nitrogen for 2 freeze-thaw cycles lasting 5 seconds, located on the above locations.   The patient's consent was  obtained including but not limited to risks of crusting, scabbing,  blistering, scarring, darker or lighter pigmentary change, recurrence, incomplete removal and infection.    Counseling.  Sun protective clothing and broad spectrum sunscreen can prevent the formation of AK.   AKs can be treated with cryotherapy, photodynamic therapy, imiquimod, topical 5-FU.  Actinic Keratoses are precancerous proliferations that occur within sun damaged skin. If untreated,  a small subset of AKs can develop into Squamous Cell Carcinoma.    3. Seborrheic keratoses   - brown stuck on appearing papules/plaques  - patient educated on benign nature. No treatment necessary unless they become irritated or inflamed         Return to clinic in 6 months FSE      AVS printed with patient instructions     Otilio Anthony MD   Mohs Surgery/Dermatologic Oncology  Dermatology

## 2022-03-11 NOTE — PATIENT INSTRUCTIONS
"Liquid Nitrogen Wound Care     - the areas treated with liquid nitrogen may form sores, blisters, and scabs. This is normal   - if a blister forms, please keep it intact and do not rupture it. It will resolve within a few days   - please keep petrolatum ointment to the area once to twice daily. You can cover with a bandage if you wish, but it is usually not necessary   - the area may be painful for a few days. We recommend ice, or over the counter tylenol or NSAIDs (such as ibuprofen or naproxen, if you are able to take these)   - this may result in a scar or a "hypopigmented" or lighter area of skin. This may or may not resolve with time   - please call our clinic if the lesion does not resolve, as this can be treated again     "

## 2022-03-24 ENCOUNTER — OFFICE VISIT (OUTPATIENT)
Dept: PAIN MEDICINE | Facility: CLINIC | Age: 82
End: 2022-03-24
Payer: COMMERCIAL

## 2022-03-24 VITALS
BODY MASS INDEX: 26.4 KG/M2 | DIASTOLIC BLOOD PRESSURE: 49 MMHG | HEART RATE: 76 BPM | SYSTOLIC BLOOD PRESSURE: 73 MMHG | HEIGHT: 63 IN | WEIGHT: 149 LBS | RESPIRATION RATE: 14 BRPM

## 2022-03-24 DIAGNOSIS — M89.49 OSTEOARTHROSIS MULTIPLE SITES, NOT SPECIFIED AS GENERALIZED: Chronic | ICD-10-CM

## 2022-03-24 DIAGNOSIS — M47.817 LUMBOSACRAL SPONDYLOSIS WITHOUT MYELOPATHY: Primary | Chronic | ICD-10-CM

## 2022-03-24 DIAGNOSIS — M47.812 SPONDYLOSIS OF CERVICAL JOINT WITHOUT MYELOPATHY: Chronic | ICD-10-CM

## 2022-03-24 DIAGNOSIS — Z11.59 SCREENING FOR VIRAL DISEASE: ICD-10-CM

## 2022-03-24 DIAGNOSIS — Z79.899 ENCOUNTER FOR LONG-TERM (CURRENT) USE OF OTHER MEDICATIONS: ICD-10-CM

## 2022-03-24 PROCEDURE — 1159F MED LIST DOCD IN RCRD: CPT | Mod: CPTII,,, | Performed by: PHYSICIAN ASSISTANT

## 2022-03-24 PROCEDURE — 1101F PR PT FALLS ASSESS DOC 0-1 FALLS W/OUT INJ PAST YR: ICD-10-PCS | Mod: CPTII,,, | Performed by: PHYSICIAN ASSISTANT

## 2022-03-24 PROCEDURE — 80305 DRUG TEST PRSMV DIR OPT OBS: CPT | Mod: PBBFAC | Performed by: PHYSICIAN ASSISTANT

## 2022-03-24 PROCEDURE — 3288F PR FALLS RISK ASSESSMENT DOCUMENTED: ICD-10-PCS | Mod: CPTII,,, | Performed by: PHYSICIAN ASSISTANT

## 2022-03-24 PROCEDURE — 1159F PR MEDICATION LIST DOCUMENTED IN MEDICAL RECORD: ICD-10-PCS | Mod: CPTII,,, | Performed by: PHYSICIAN ASSISTANT

## 2022-03-24 PROCEDURE — 3078F DIAST BP <80 MM HG: CPT | Mod: CPTII,,, | Performed by: PHYSICIAN ASSISTANT

## 2022-03-24 PROCEDURE — 3074F SYST BP LT 130 MM HG: CPT | Mod: CPTII,,, | Performed by: PHYSICIAN ASSISTANT

## 2022-03-24 PROCEDURE — 1125F AMNT PAIN NOTED PAIN PRSNT: CPT | Mod: CPTII,,, | Performed by: PHYSICIAN ASSISTANT

## 2022-03-24 PROCEDURE — 1101F PT FALLS ASSESS-DOCD LE1/YR: CPT | Mod: CPTII,,, | Performed by: PHYSICIAN ASSISTANT

## 2022-03-24 PROCEDURE — 99214 PR OFFICE/OUTPT VISIT, EST, LEVL IV, 30-39 MIN: ICD-10-PCS | Mod: S$PBB,,, | Performed by: PHYSICIAN ASSISTANT

## 2022-03-24 PROCEDURE — 99215 OFFICE O/P EST HI 40 MIN: CPT | Mod: PBBFAC | Performed by: PHYSICIAN ASSISTANT

## 2022-03-24 PROCEDURE — 3078F PR MOST RECENT DIASTOLIC BLOOD PRESSURE < 80 MM HG: ICD-10-PCS | Mod: CPTII,,, | Performed by: PHYSICIAN ASSISTANT

## 2022-03-24 PROCEDURE — 1125F PR PAIN SEVERITY QUANTIFIED, PAIN PRESENT: ICD-10-PCS | Mod: CPTII,,, | Performed by: PHYSICIAN ASSISTANT

## 2022-03-24 PROCEDURE — 3074F PR MOST RECENT SYSTOLIC BLOOD PRESSURE < 130 MM HG: ICD-10-PCS | Mod: CPTII,,, | Performed by: PHYSICIAN ASSISTANT

## 2022-03-24 PROCEDURE — 99214 OFFICE O/P EST MOD 30 MIN: CPT | Mod: S$PBB,,, | Performed by: PHYSICIAN ASSISTANT

## 2022-03-24 PROCEDURE — 3288F FALL RISK ASSESSMENT DOCD: CPT | Mod: CPTII,,, | Performed by: PHYSICIAN ASSISTANT

## 2022-03-24 RX ORDER — HYDROCODONE BITARTRATE AND ACETAMINOPHEN 5; 325 MG/1; MG/1
1 TABLET ORAL EVERY 8 HOURS
Qty: 90 TABLET | Refills: 0 | Status: CANCELLED | OUTPATIENT
Start: 2022-03-24 | End: 2022-04-23

## 2022-03-24 RX ORDER — HYDROCODONE BITARTRATE AND ACETAMINOPHEN 5; 325 MG/1; MG/1
1 TABLET ORAL EVERY 8 HOURS
Qty: 90 TABLET | Refills: 0 | Status: SHIPPED | OUTPATIENT
Start: 2022-03-28 | End: 2022-04-28 | Stop reason: SDUPTHER

## 2022-03-24 RX ORDER — GABAPENTIN 100 MG/1
100 CAPSULE ORAL EVERY 8 HOURS
Qty: 270 CAPSULE | Refills: 2 | Status: SHIPPED | OUTPATIENT
Start: 2022-03-24 | End: 2022-04-28 | Stop reason: SDUPTHER

## 2022-03-24 NOTE — PATIENT INSTRUCTIONS
ALL PATIENTS TO HAVE COVID SCREENING DONE 48-72 HOURS PRIOR TO PROCEDURE INCLUDING PATIENTS THAT WHOM HAVE HAD COVID VACCINE!!!     IF YOUR  PROCEDURE IS ON A Tuesday, GET COVID TESTING ON THE  Friday BEFORE PROCEDURE.    IF YOUR PROCEDURE IS ON Thursday, HAVE COVID TESTING ON THE Monday OR Tuesday PRIOR TO PROCEDURE    COVID TESTING TO BE DONE AT [facility] IN THE LAB DEPARTMENT OR YOUR PRIMARY CARE DOCTOR MAY ORDER IT FOR YOU.    IF YOUR PRIMARY  CARE DOCTOR ORDERS YOUR COVID TESTING YOU MUST BRING YOUR RESULTS WITH YOU TO YOUR PROCEDURE.    Procedure Instructions:    Nothing to eat or drink for 8 hours or after midnight including gum, candy, mints, or tobacco products.  If you are scheduled for 1:30 or later nothing to eat or drink after 5 a.m. the morning of the procedure, including gum, candy, mints, or tobacco products.  Must have a  at least 18 yrs of age to stay present at all times  No Diabetic medications the morning of procedure, check blood sugar the morning of procedure, if it is greater than 200 call the office at [phone]  If you are started on antibiotics or have been prescribed antibiotics, have a fever, or have any other type of infection call to reschedule procedure.  If you take blood pressure medications you can take it at your regular scheduled time with a small sip of WATER!    HOLD ASPIRIN AND ASPIRIN PRODUCTS  (ASPIRIN, BC POWDER ETC. ) FOR 7 DAYS  PRIOR TO PROCEDURE  HOLD NSAIDS( ibuprofen, mobic, meloxicam, advil, diclofenac, methotrexate, aleve etc....)  FOR 3 DAYS   PRIOR TO PROCEDURE

## 2022-03-24 NOTE — PROGRESS NOTES
Disclaimer:  This note has been generated using voice recognition software.  There may be type of graft focal areas that have been missed during a proof reading      Subjective:      Patient ID: Sharee Elizondo is a 82 y.o. female.    Chief Complaint: Leg Pain and Neck Pain      Pain  This is a chronic problem. The current episode started more than 1 year ago. The problem occurs daily. The problem has been unchanged. Associated symptoms include arthralgias. Pertinent negatives include no change in bowel habit, chest pain, chills, coughing, diaphoresis, fever, neck pain, rash, sore throat, vertigo or vomiting.     Review of Systems   Constitutional: Negative for activity change, appetite change, chills, diaphoresis, fever and unexpected weight change.   HENT: Negative for drooling, ear pain, facial swelling, nosebleeds, sore throat, trouble swallowing, voice change and goiter.    Eyes: Negative for photophobia, pain, discharge, redness and visual disturbance.   Respiratory: Negative for apnea, cough, choking, chest tightness, shortness of breath, wheezing and stridor.    Cardiovascular: Negative for chest pain, palpitations and leg swelling.   Gastrointestinal: Negative for abdominal distention, change in bowel habit, diarrhea, rectal pain, vomiting, fecal incontinence and change in bowel habit.   Endocrine: Negative for cold intolerance, heat intolerance, polydipsia, polyphagia and polyuria.   Genitourinary: Negative for bladder incontinence, dysuria, flank pain, frequency and hot flashes.   Musculoskeletal: Positive for arthralgias, back pain, gait problem and leg pain. Negative for neck pain and neck stiffness.   Integumentary:  Negative for color change, pallor and rash.   Allergic/Immunologic: Negative for immunocompromised state.   Neurological: Negative for dizziness, vertigo, seizures, syncope, facial asymmetry, speech difficulty, light-headedness, disturbances in coordination, memory loss and coordination  "difficulties.   Hematological: Negative for adenopathy. Does not bruise/bleed easily.   Psychiatric/Behavioral: Negative for agitation, behavioral problems, confusion, decreased concentration, dysphoric mood, hallucinations, self-injury and suicidal ideas. The patient is not nervous/anxious and is not hyperactive.             Objective:  Vitals:    03/24/22 1104 03/24/22 1105   BP: (!) 73/49    Pulse: 76    Resp: 14    Weight: 67.6 kg (149 lb)    Height: 5' 3" (1.6 m)    PainSc:   6   6         Physical Exam  Vitals and nursing note reviewed. Exam conducted with a chaperone present.   Constitutional:       General: She is awake. She is not in acute distress.     Appearance: Normal appearance. She is not toxic-appearing or diaphoretic.   HENT:      Head: Normocephalic and atraumatic.      Nose: Nose normal.      Mouth/Throat:      Mouth: Mucous membranes are moist.      Pharynx: Oropharynx is clear.   Eyes:      Conjunctiva/sclera: Conjunctivae normal.      Pupils: Pupils are equal, round, and reactive to light.   Cardiovascular:      Rate and Rhythm: Normal rate.   Pulmonary:      Effort: Pulmonary effort is normal. No respiratory distress.   Abdominal:      Palpations: Abdomen is soft.      Tenderness: There is no guarding.   Musculoskeletal:         General: Normal range of motion.      Cervical back: Normal range of motion and neck supple. Tenderness present. No rigidity.      Thoracic back: Tenderness present.      Lumbar back: Tenderness present.   Skin:     General: Skin is warm and dry.      Coloration: Skin is not jaundiced or pale.   Neurological:      General: No focal deficit present.      Mental Status: She is alert and oriented to person, place, and time. Mental status is at baseline.      Cranial Nerves: Cranial nerves are intact. No cranial nerve deficit (II-XII).      Gait: Gait abnormal.   Psychiatric:         Mood and Affect: Mood normal.         Behavior: Behavior normal. Behavior is cooperative.  "        Thought Content: Thought content normal.           EGD  Narrative: Procedure Date  2/18/22    Impression  Overall   Impression:  Mucosa of the esophagus was normal. There appeared to   be esophageal spasm. The distal esophagus was biopsied and then dilated   with a 48F Bejarano. A small hiatus hernia was present. The stomach had   gastritis without ulcers, biopsies were obtained. Mucosa of the duodenum   was normal.    Recommendation  Await pathology results; continue ppi; repeat dilation prn.    Indication  Dysphagia    Providers  Attending: Michael Long MD  Fellow:    Other Staff: Bret Mccormick RN, Terry Cabezas RN, Parmjit Ramirez CRNA    Medications  Moderate sedation administered by anesthesia staff - See anesthesia   record.    Preprocedure  A history and physical has been performed, and patient medication   allergies have been reviewed. The patient's tolerance of previous   anesthesia has been reviewed. The risks and benefits of the procedure and   the sedation options and risks were discussed with the patient. All   questions were answered and informed consent obtained.    ASA Score: ASA 2 - Patient with mild systemic disease with no functional   limitations  Mallampati Airway Score: II (hard and soft palate, upper portion of   tonsils anduvula visible)    Details of the Procedure  The patient underwent monitored anesthesia care, which was administered by   an anesthesia professional. The patient's blood pressure, heart rate,   level of consciousness, oxygen, respirations, ECG and ETCO2 were monitored   throughout the procedure. The scope was introduced through the mouth and   advanced to the third part of the duodenum. Retroflexion was performed in   the cardia. Prior to the procedure, the patient's H. Pylori status was   unknown. The patient's estimated blood loss was minimal (<5 mL). The   procedure was not difficult. The patient tolerated the procedure well.   There were no  apparent complications.     Scope: Gastroscope  Scope Serial: [id number]    Events  Procedure Events   Event Event Time     Procedure Events   Event Event Time   SCOPE IN 2/18/2022  1:52 PM   SCOPE OUT 2/18/2022  1:55 PM     Findings  Small hiatal hernia  Erythematous mucosa in the fundus of the stomach and antrum; performed   cold forceps biopsy       Hospital Outpatient Visit on 02/18/2022   Component Date Value Ref Range Status    Case Report 02/18/2022    Final                    Value:Surgical Pathology                                Case: [id number]                                   Authorizing Provider:  Michael Long MD      Collected:           02/18/2022 01:54 PM          Ordering Location:     [facility] - Endoscopy       Received:            02/18/2022 02:34 PM          Pathologist:           David Harris MD                                                       Specimens:   A) - Stomach, A- Stomach bx                                                                         B) - Esophagus, B- Esophaugus bx                                                           Final Diagnosis 02/18/2022    Final                    Value:This result contains rich text formatting which cannot be displayed here.    Comments 02/18/2022    Final                    Value:This result contains rich text formatting which cannot be displayed here.    Gross Description 02/18/2022    Final                    Value:This result contains rich text formatting which cannot be displayed here.    Microscopic Description 02/18/2022    Final                    Value:This result contains rich text formatting which cannot be displayed here.    Laboratory Notes 02/18/2022    Final                    Value:This result contains rich text formatting which cannot be displayed here.   Appointment on 02/17/2022   Component Date Value Ref Range Status    SARS Coronavirus 2 Antigen 02/17/2022 Negative  Negative Final      Acceptable 02/17/2022 Yes   Final   Office Visit on 02/03/2022   Component Date Value Ref Range Status    Triglycerides 02/03/2022 296 (A) 35 - 150 mg/dL Final    Cholesterol 02/03/2022 294 (A) 0 - 200 mg/dL Final    HDL Cholesterol 02/03/2022 35 (A) 40 - 60 mg/dL Final    Cholesterol/HDL Ratio (Risk Factor) 02/03/2022 8.4   Final    Non-HDL 02/03/2022 259  mg/dL Final    LDL Calculated 02/03/2022 200  mg/dL Final    LDL/HDL 02/03/2022 5.7   Final    VLDL 02/03/2022 59  mg/dL Final    TSH 02/03/2022 4.950 (A) 0.358 - 3.740 uIU/mL Final    Sodium 02/03/2022 133 (A) 136 - 145 mmol/L Final    Potassium 02/03/2022 3.9  3.5 - 5.1 mmol/L Final    Chloride 02/03/2022 101  98 - 107 mmol/L Final    CO2 02/03/2022 24  21 - 32 mmol/L Final    Anion Gap 02/03/2022 12  7 - 16 mmol/L Final    Glucose 02/03/2022 106  74 - 106 mg/dL Final    BUN 02/03/2022 19 (A) 7 - 18 mg/dL Final    Creatinine 02/03/2022 1.04 (A) 0.55 - 1.02 mg/dL Final    BUN/Creatinine Ratio 02/03/2022 18  6 - 20 Final    Calcium 02/03/2022 9.5  8.5 - 10.1 mg/dL Final    Total Protein 02/03/2022 7.5  6.4 - 8.2 g/dL Final    Albumin 02/03/2022 3.7  3.5 - 5.0 g/dL Final    Globulin 02/03/2022 3.8  2.0 - 4.0 g/dL Final    A/G Ratio 02/03/2022 1.0   Final    Bilirubin, Total 02/03/2022 0.4  0.0 - 1.2 mg/dL Final    Alk Phos 02/03/2022 101  55 - 142 U/L Final    ALT 02/03/2022 15  13 - 56 U/L Final    AST 02/03/2022 10 (A) 15 - 37 U/L Final    eGFR 02/03/2022 54 (A) >=60 mL/min/1.73m² Final    WBC 02/03/2022 8.56  4.50 - 11.00 K/uL Final    RBC 02/03/2022 4.46  4.20 - 5.40 M/uL Final    Hemoglobin 02/03/2022 13.4  12.0 - 16.0 g/dL Final    Hematocrit 02/03/2022 40.0  38.0 - 47.0 % Final    MCV 02/03/2022 89.7  80.0 - 96.0 fL Final    MCH 02/03/2022 30.0  27.0 - 31.0 pg Final    MCHC 02/03/2022 33.5  32.0 - 36.0 g/dL Final    RDW 02/03/2022 13.2  11.5 - 14.5 % Final    Platelet Count 02/03/2022 311  150 - 400 K/uL Final    MPV  02/03/2022 11.3  9.4 - 12.4 fL Final    Neutrophils % 02/03/2022 58.8  53.0 - 65.0 % Final    Lymphocytes % 02/03/2022 29.4  27.0 - 41.0 % Final    Monocytes % 02/03/2022 6.4 (A) 2.0 - 6.0 % Final    Eosinophils % 02/03/2022 3.7  1.0 - 4.0 % Final    Basophils % 02/03/2022 1.3 (A) 0.0 - 1.0 % Final    Immature Granulocytes % 02/03/2022 0.4  0.0 - 0.4 % Final    nRBC, Auto 02/03/2022 0.0  <=0.0 % Final    Neutrophils, Abs 02/03/2022 5.03  1.80 - 7.70 K/uL Final    Lymphocytes, Absolute 02/03/2022 2.52  1.00 - 4.80 K/uL Final    Monocytes, Absolute 02/03/2022 0.55  0.00 - 0.80 K/uL Final    Eosinophils, Absolute 02/03/2022 0.32  0.00 - 0.50 K/uL Final    Basophils, Absolute 02/03/2022 0.11  0.00 - 0.20 K/uL Final    Immature Granulocytes, Absolute 02/03/2022 0.03  0.00 - 0.04 K/uL Final    nRBC, Absolute 02/03/2022 0.00  <=0.00 x10e3/uL Final    Diff Type 02/03/2022 Auto   Final    Hemoglobin A1C 02/03/2022 6.4  4.5 - 6.6 % Final    Estimated Average Glucose 02/03/2022 127  mg/dL Final   Office Visit on 01/20/2022   Component Date Value Ref Range Status    SARS Coronavirus 2 Antigen 01/20/2022 Positive (A) Negative Final     Acceptable 01/20/2022 Yes   Final   Office Visit on 12/20/2021   Component Date Value Ref Range Status    POC Amphetamines 12/20/2021 Negative  Negative, Inconclusive Final    POC Barbiturates 12/20/2021 Negative  Negative, Inconclusive Final    POC Benzodiazepines 12/20/2021 Negative  Negative, Inconclusive Final    POC Cocaine 12/20/2021 Negative  Negative, Inconclusive Final    POC THC 12/20/2021 Negative  Negative, Inconclusive Final    POC Methadone 12/20/2021 Negative  Negative, Inconclusive Final    POC Methamphetamine 12/20/2021 Negative  Negative, Inconclusive Final    POC Opiates 12/20/2021 Presumptive Positive (A) Negative, Inconclusive Final    POC Oxycodone 12/20/2021 Negative  Negative, Inconclusive Final    POC Phencyclidine 12/20/2021  Negative  Negative, Inconclusive Final    POC Methylenedioxymethamphetamine * 12/20/2021 Negative  Negative, Inconclusive Final    POC Tricyclic Antidepressants 12/20/2021 Negative  Negative, Inconclusive Final    POC Buprenorphine 12/20/2021 Negative   Final     Acceptable 12/20/2021 Yes   Final    POC Temperature (Urine) 12/20/2021 92   Final           Assessment:      1. Lumbar radiculopathy    2. 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 Encounter for long-term (current) use of other medications          Orders Placed This Encounter   Procedures    POCT Urine Drug Screen Presump     Interpretive Information:     Negative:  No drug detected at the cut off level.   Positive:  This result represents presumptive positive for the   tested drug, other substances may yield a positive response other   than the analyte of interest. This result should be utilized for   diagnostic purpose only. Confirmation testing will be performed upon physician request only.            A's of Opioid Risk Assessment  Activity:Patient can perform ADL.   Analgesia:Patients pain is partially controlled by current medication. Patient has tried OTC medications such as Tylenol and Ibuprofen with out relief.   Adverse Effects: Patient denies constipation or sedation.  Aberrant Behavior:  reviewed with no aberrant drug seeking/taking behavior.  Overdose reversal drug naloxone discussed    Drug screen reviewed      Requested Prescriptions     Pending Prescriptions Disp Refills    gabapentin (NEURONTIN) 100 MG capsule 270 capsule 2     Sig: Take 1 capsule (100 mg total) by mouth every 8 (eight) hours.    HYDROcodone-acetaminophen (NORCO) 5-325 mg per tablet 90 tablet 0     Sig: Take 1 tablet by mouth every 8 (eight) hours.    HYDROcodone-acetaminophen (NORCO) 5-325 mg per tablet 90 tablet 0     Sig: Take 1 tablet by mouth every 8 (eight)  hours.    HYDROcodone-acetaminophen (NORCO) 5-325 mg per tablet 90 tablet 0     Sig: Take 1 tablet by mouth every 8 (eight) hours.         Plan:    Complaint low back pain worse with flexion extension lumbar spine ongoing for more than 3 months tenderness along the lumbar facet area lower area bilateral facet joint in nature    She is requesting lumbar procedure for her lower back pain    She will continue home exercise program as directed    Continue current medication    Indications for this procedure for this specific patient include the following   - Pt has had symptoms for three months with moderate to severe pain with functional impairment rated of 7/10 pain.   - Pain non-responsive to conservative care.    - Pain predominately axial and not associated with radiculopathy or claudication.    - No non-facet pathology as source of pain.    - Clinical assessment implicates facet joint as putative pain source.    - Pain is exacerbated by extension or prolonged sitting/standing and relieved by rest.    - No unexplained neurologic deficit.    - No history of coagulopathy, infection or unstable medical conditions.    - Pain is causing significant functional limitation resulting in diminished quality of life and impaired age appropriate ADL's.   - Clinical assessment implicates facet joint as putative source of pain  - Repeat injections not done prior to 7 days   - no more than 2 levels will be done      Monitor anesthesia request is medically indicated for the scheduled nerve block procedure due to:  1- needle phobia and anxiety, placing  the patient at risk during the provided service.  2-patient has an ASA class greater than 3 and requires constant presence of an anesthesiologist during the procedure,   3-patient has severe problems hard to lie still  4-patient suffers from chronic pain and is unable to function due to  diminished ADLs    Schedule bilateral lumbar L4 through S1 medial branch block # 1, lumbosacral  spondylosis    Dr. Farah    Bring original prescription medication bottles/container/box with labels to each visit

## 2022-03-24 NOTE — H&P (VIEW-ONLY)
Disclaimer:  This note has been generated using voice recognition software.  There may be type of graft focal areas that have been missed during a proof reading      Subjective:      Patient ID: Sharee Elizondo is a 82 y.o. female.    Chief Complaint: Leg Pain and Neck Pain      Pain  This is a chronic problem. The current episode started more than 1 year ago. The problem occurs daily. The problem has been unchanged. Associated symptoms include arthralgias. Pertinent negatives include no change in bowel habit, chest pain, chills, coughing, diaphoresis, fever, neck pain, rash, sore throat, vertigo or vomiting.     Review of Systems   Constitutional: Negative for activity change, appetite change, chills, diaphoresis, fever and unexpected weight change.   HENT: Negative for drooling, ear pain, facial swelling, nosebleeds, sore throat, trouble swallowing, voice change and goiter.    Eyes: Negative for photophobia, pain, discharge, redness and visual disturbance.   Respiratory: Negative for apnea, cough, choking, chest tightness, shortness of breath, wheezing and stridor.    Cardiovascular: Negative for chest pain, palpitations and leg swelling.   Gastrointestinal: Negative for abdominal distention, change in bowel habit, diarrhea, rectal pain, vomiting, fecal incontinence and change in bowel habit.   Endocrine: Negative for cold intolerance, heat intolerance, polydipsia, polyphagia and polyuria.   Genitourinary: Negative for bladder incontinence, dysuria, flank pain, frequency and hot flashes.   Musculoskeletal: Positive for arthralgias, back pain, gait problem and leg pain. Negative for neck pain and neck stiffness.   Integumentary:  Negative for color change, pallor and rash.   Allergic/Immunologic: Negative for immunocompromised state.   Neurological: Negative for dizziness, vertigo, seizures, syncope, facial asymmetry, speech difficulty, light-headedness, disturbances in coordination, memory loss and coordination  "difficulties.   Hematological: Negative for adenopathy. Does not bruise/bleed easily.   Psychiatric/Behavioral: Negative for agitation, behavioral problems, confusion, decreased concentration, dysphoric mood, hallucinations, self-injury and suicidal ideas. The patient is not nervous/anxious and is not hyperactive.             Objective:  Vitals:    03/24/22 1104 03/24/22 1105   BP: (!) 73/49    Pulse: 76    Resp: 14    Weight: 67.6 kg (149 lb)    Height: 5' 3" (1.6 m)    PainSc:   6   6         Physical Exam  Vitals and nursing note reviewed. Exam conducted with a chaperone present.   Constitutional:       General: She is awake. She is not in acute distress.     Appearance: Normal appearance. She is not toxic-appearing or diaphoretic.   HENT:      Head: Normocephalic and atraumatic.      Nose: Nose normal.      Mouth/Throat:      Mouth: Mucous membranes are moist.      Pharynx: Oropharynx is clear.   Eyes:      Conjunctiva/sclera: Conjunctivae normal.      Pupils: Pupils are equal, round, and reactive to light.   Cardiovascular:      Rate and Rhythm: Normal rate.   Pulmonary:      Effort: Pulmonary effort is normal. No respiratory distress.   Abdominal:      Palpations: Abdomen is soft.      Tenderness: There is no guarding.   Musculoskeletal:         General: Normal range of motion.      Cervical back: Normal range of motion and neck supple. Tenderness present. No rigidity.      Thoracic back: Tenderness present.      Lumbar back: Tenderness present.   Skin:     General: Skin is warm and dry.      Coloration: Skin is not jaundiced or pale.   Neurological:      General: No focal deficit present.      Mental Status: She is alert and oriented to person, place, and time. Mental status is at baseline.      Cranial Nerves: Cranial nerves are intact. No cranial nerve deficit (II-XII).      Gait: Gait abnormal.   Psychiatric:         Mood and Affect: Mood normal.         Behavior: Behavior normal. Behavior is cooperative.  "        Thought Content: Thought content normal.           EGD  Narrative: Procedure Date  2/18/22    Impression  Overall   Impression:  Mucosa of the esophagus was normal. There appeared to   be esophageal spasm. The distal esophagus was biopsied and then dilated   with a 48F Bejarano. A small hiatus hernia was present. The stomach had   gastritis without ulcers, biopsies were obtained. Mucosa of the duodenum   was normal.    Recommendation  Await pathology results; continue ppi; repeat dilation prn.    Indication  Dysphagia    Providers  Attending: Michael Long MD  Fellow:    Other Staff: Bret Mccormick RN, Terry Cabezas RN, Parmjit Ramirez CRNA    Medications  Moderate sedation administered by anesthesia staff - See anesthesia   record.    Preprocedure  A history and physical has been performed, and patient medication   allergies have been reviewed. The patient's tolerance of previous   anesthesia has been reviewed. The risks and benefits of the procedure and   the sedation options and risks were discussed with the patient. All   questions were answered and informed consent obtained.    ASA Score: ASA 2 - Patient with mild systemic disease with no functional   limitations  Mallampati Airway Score: II (hard and soft palate, upper portion of   tonsils anduvula visible)    Details of the Procedure  The patient underwent monitored anesthesia care, which was administered by   an anesthesia professional. The patient's blood pressure, heart rate,   level of consciousness, oxygen, respirations, ECG and ETCO2 were monitored   throughout the procedure. The scope was introduced through the mouth and   advanced to the third part of the duodenum. Retroflexion was performed in   the cardia. Prior to the procedure, the patient's H. Pylori status was   unknown. The patient's estimated blood loss was minimal (<5 mL). The   procedure was not difficult. The patient tolerated the procedure well.   There were no  apparent complications.     Scope: Gastroscope  Scope Serial: [id number]    Events  Procedure Events   Event Event Time     Procedure Events   Event Event Time   SCOPE IN 2/18/2022  1:52 PM   SCOPE OUT 2/18/2022  1:55 PM     Findings  Small hiatal hernia  Erythematous mucosa in the fundus of the stomach and antrum; performed   cold forceps biopsy       Hospital Outpatient Visit on 02/18/2022   Component Date Value Ref Range Status    Case Report 02/18/2022    Final                    Value:Surgical Pathology                                Case: [id number]                                   Authorizing Provider:  Michael Long MD      Collected:           02/18/2022 01:54 PM          Ordering Location:     [facility] - Endoscopy       Received:            02/18/2022 02:34 PM          Pathologist:           David Harris MD                                                       Specimens:   A) - Stomach, A- Stomach bx                                                                         B) - Esophagus, B- Esophaugus bx                                                           Final Diagnosis 02/18/2022    Final                    Value:This result contains rich text formatting which cannot be displayed here.    Comments 02/18/2022    Final                    Value:This result contains rich text formatting which cannot be displayed here.    Gross Description 02/18/2022    Final                    Value:This result contains rich text formatting which cannot be displayed here.    Microscopic Description 02/18/2022    Final                    Value:This result contains rich text formatting which cannot be displayed here.    Laboratory Notes 02/18/2022    Final                    Value:This result contains rich text formatting which cannot be displayed here.   Appointment on 02/17/2022   Component Date Value Ref Range Status    SARS Coronavirus 2 Antigen 02/17/2022 Negative  Negative Final      Acceptable 02/17/2022 Yes   Final   Office Visit on 02/03/2022   Component Date Value Ref Range Status    Triglycerides 02/03/2022 296 (A) 35 - 150 mg/dL Final    Cholesterol 02/03/2022 294 (A) 0 - 200 mg/dL Final    HDL Cholesterol 02/03/2022 35 (A) 40 - 60 mg/dL Final    Cholesterol/HDL Ratio (Risk Factor) 02/03/2022 8.4   Final    Non-HDL 02/03/2022 259  mg/dL Final    LDL Calculated 02/03/2022 200  mg/dL Final    LDL/HDL 02/03/2022 5.7   Final    VLDL 02/03/2022 59  mg/dL Final    TSH 02/03/2022 4.950 (A) 0.358 - 3.740 uIU/mL Final    Sodium 02/03/2022 133 (A) 136 - 145 mmol/L Final    Potassium 02/03/2022 3.9  3.5 - 5.1 mmol/L Final    Chloride 02/03/2022 101  98 - 107 mmol/L Final    CO2 02/03/2022 24  21 - 32 mmol/L Final    Anion Gap 02/03/2022 12  7 - 16 mmol/L Final    Glucose 02/03/2022 106  74 - 106 mg/dL Final    BUN 02/03/2022 19 (A) 7 - 18 mg/dL Final    Creatinine 02/03/2022 1.04 (A) 0.55 - 1.02 mg/dL Final    BUN/Creatinine Ratio 02/03/2022 18  6 - 20 Final    Calcium 02/03/2022 9.5  8.5 - 10.1 mg/dL Final    Total Protein 02/03/2022 7.5  6.4 - 8.2 g/dL Final    Albumin 02/03/2022 3.7  3.5 - 5.0 g/dL Final    Globulin 02/03/2022 3.8  2.0 - 4.0 g/dL Final    A/G Ratio 02/03/2022 1.0   Final    Bilirubin, Total 02/03/2022 0.4  0.0 - 1.2 mg/dL Final    Alk Phos 02/03/2022 101  55 - 142 U/L Final    ALT 02/03/2022 15  13 - 56 U/L Final    AST 02/03/2022 10 (A) 15 - 37 U/L Final    eGFR 02/03/2022 54 (A) >=60 mL/min/1.73m² Final    WBC 02/03/2022 8.56  4.50 - 11.00 K/uL Final    RBC 02/03/2022 4.46  4.20 - 5.40 M/uL Final    Hemoglobin 02/03/2022 13.4  12.0 - 16.0 g/dL Final    Hematocrit 02/03/2022 40.0  38.0 - 47.0 % Final    MCV 02/03/2022 89.7  80.0 - 96.0 fL Final    MCH 02/03/2022 30.0  27.0 - 31.0 pg Final    MCHC 02/03/2022 33.5  32.0 - 36.0 g/dL Final    RDW 02/03/2022 13.2  11.5 - 14.5 % Final    Platelet Count 02/03/2022 311  150 - 400 K/uL Final    MPV  02/03/2022 11.3  9.4 - 12.4 fL Final    Neutrophils % 02/03/2022 58.8  53.0 - 65.0 % Final    Lymphocytes % 02/03/2022 29.4  27.0 - 41.0 % Final    Monocytes % 02/03/2022 6.4 (A) 2.0 - 6.0 % Final    Eosinophils % 02/03/2022 3.7  1.0 - 4.0 % Final    Basophils % 02/03/2022 1.3 (A) 0.0 - 1.0 % Final    Immature Granulocytes % 02/03/2022 0.4  0.0 - 0.4 % Final    nRBC, Auto 02/03/2022 0.0  <=0.0 % Final    Neutrophils, Abs 02/03/2022 5.03  1.80 - 7.70 K/uL Final    Lymphocytes, Absolute 02/03/2022 2.52  1.00 - 4.80 K/uL Final    Monocytes, Absolute 02/03/2022 0.55  0.00 - 0.80 K/uL Final    Eosinophils, Absolute 02/03/2022 0.32  0.00 - 0.50 K/uL Final    Basophils, Absolute 02/03/2022 0.11  0.00 - 0.20 K/uL Final    Immature Granulocytes, Absolute 02/03/2022 0.03  0.00 - 0.04 K/uL Final    nRBC, Absolute 02/03/2022 0.00  <=0.00 x10e3/uL Final    Diff Type 02/03/2022 Auto   Final    Hemoglobin A1C 02/03/2022 6.4  4.5 - 6.6 % Final    Estimated Average Glucose 02/03/2022 127  mg/dL Final   Office Visit on 01/20/2022   Component Date Value Ref Range Status    SARS Coronavirus 2 Antigen 01/20/2022 Positive (A) Negative Final     Acceptable 01/20/2022 Yes   Final   Office Visit on 12/20/2021   Component Date Value Ref Range Status    POC Amphetamines 12/20/2021 Negative  Negative, Inconclusive Final    POC Barbiturates 12/20/2021 Negative  Negative, Inconclusive Final    POC Benzodiazepines 12/20/2021 Negative  Negative, Inconclusive Final    POC Cocaine 12/20/2021 Negative  Negative, Inconclusive Final    POC THC 12/20/2021 Negative  Negative, Inconclusive Final    POC Methadone 12/20/2021 Negative  Negative, Inconclusive Final    POC Methamphetamine 12/20/2021 Negative  Negative, Inconclusive Final    POC Opiates 12/20/2021 Presumptive Positive (A) Negative, Inconclusive Final    POC Oxycodone 12/20/2021 Negative  Negative, Inconclusive Final    POC Phencyclidine 12/20/2021  Negative  Negative, Inconclusive Final    POC Methylenedioxymethamphetamine * 12/20/2021 Negative  Negative, Inconclusive Final    POC Tricyclic Antidepressants 12/20/2021 Negative  Negative, Inconclusive Final    POC Buprenorphine 12/20/2021 Negative   Final     Acceptable 12/20/2021 Yes   Final    POC Temperature (Urine) 12/20/2021 92   Final           Assessment:      1. Lumbar radiculopathy    2. 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 Encounter for long-term (current) use of other medications          Orders Placed This Encounter   Procedures    POCT Urine Drug Screen Presump     Interpretive Information:     Negative:  No drug detected at the cut off level.   Positive:  This result represents presumptive positive for the   tested drug, other substances may yield a positive response other   than the analyte of interest. This result should be utilized for   diagnostic purpose only. Confirmation testing will be performed upon physician request only.            A's of Opioid Risk Assessment  Activity:Patient can perform ADL.   Analgesia:Patients pain is partially controlled by current medication. Patient has tried OTC medications such as Tylenol and Ibuprofen with out relief.   Adverse Effects: Patient denies constipation or sedation.  Aberrant Behavior:  reviewed with no aberrant drug seeking/taking behavior.  Overdose reversal drug naloxone discussed    Drug screen reviewed      Requested Prescriptions     Pending Prescriptions Disp Refills    gabapentin (NEURONTIN) 100 MG capsule 270 capsule 2     Sig: Take 1 capsule (100 mg total) by mouth every 8 (eight) hours.    HYDROcodone-acetaminophen (NORCO) 5-325 mg per tablet 90 tablet 0     Sig: Take 1 tablet by mouth every 8 (eight) hours.    HYDROcodone-acetaminophen (NORCO) 5-325 mg per tablet 90 tablet 0     Sig: Take 1 tablet by mouth every 8 (eight)  hours.    HYDROcodone-acetaminophen (NORCO) 5-325 mg per tablet 90 tablet 0     Sig: Take 1 tablet by mouth every 8 (eight) hours.         Plan:    Complaint low back pain worse with flexion extension lumbar spine ongoing for more than 3 months tenderness along the lumbar facet area lower area bilateral facet joint in nature    She is requesting lumbar procedure for her lower back pain    She will continue home exercise program as directed    Continue current medication    Indications for this procedure for this specific patient include the following   - Pt has had symptoms for three months with moderate to severe pain with functional impairment rated of 7/10 pain.   - Pain non-responsive to conservative care.    - Pain predominately axial and not associated with radiculopathy or claudication.    - No non-facet pathology as source of pain.    - Clinical assessment implicates facet joint as putative pain source.    - Pain is exacerbated by extension or prolonged sitting/standing and relieved by rest.    - No unexplained neurologic deficit.    - No history of coagulopathy, infection or unstable medical conditions.    - Pain is causing significant functional limitation resulting in diminished quality of life and impaired age appropriate ADL's.   - Clinical assessment implicates facet joint as putative source of pain  - Repeat injections not done prior to 7 days   - no more than 2 levels will be done      Monitor anesthesia request is medically indicated for the scheduled nerve block procedure due to:  1- needle phobia and anxiety, placing  the patient at risk during the provided service.  2-patient has an ASA class greater than 3 and requires constant presence of an anesthesiologist during the procedure,   3-patient has severe problems hard to lie still  4-patient suffers from chronic pain and is unable to function due to  diminished ADLs    Schedule bilateral lumbar L4 through S1 medial branch block # 1, lumbosacral  spondylosis    Dr. Farah    Bring original prescription medication bottles/container/box with labels to each visit

## 2022-03-31 RX ORDER — NITROGLYCERIN 0.4 MG/1
0.4 TABLET SUBLINGUAL EVERY 5 MIN PRN
Qty: 30 TABLET | Refills: 2 | Status: SHIPPED | OUTPATIENT
Start: 2022-03-31 | End: 2023-06-14 | Stop reason: SDUPTHER

## 2022-04-08 ENCOUNTER — CLINICAL SUPPORT (OUTPATIENT)
Dept: FAMILY MEDICINE | Facility: CLINIC | Age: 82
End: 2022-04-08
Payer: MEDICAID

## 2022-04-08 NOTE — PROGRESS NOTES
Nurse visit only O2 Check pt brought her personal machine. Clinic o2 was showing 99% . Pt denies any sob or any other problems.

## 2022-04-14 ENCOUNTER — ANESTHESIA (OUTPATIENT)
Dept: PAIN MEDICINE | Facility: HOSPITAL | Age: 82
End: 2022-04-14
Payer: COMMERCIAL

## 2022-04-14 ENCOUNTER — ANESTHESIA EVENT (OUTPATIENT)
Dept: PAIN MEDICINE | Facility: HOSPITAL | Age: 82
End: 2022-04-14
Payer: COMMERCIAL

## 2022-04-14 ENCOUNTER — HOSPITAL ENCOUNTER (OUTPATIENT)
Facility: HOSPITAL | Age: 82
Discharge: HOME | End: 2022-04-14
Attending: PAIN MEDICINE | Admitting: PAIN MEDICINE
Payer: COMMERCIAL

## 2022-04-14 VITALS
DIASTOLIC BLOOD PRESSURE: 47 MMHG | HEART RATE: 67 BPM | HEIGHT: 63 IN | RESPIRATION RATE: 14 BRPM | BODY MASS INDEX: 26.58 KG/M2 | OXYGEN SATURATION: 96 % | WEIGHT: 150 LBS | TEMPERATURE: 99 F | SYSTOLIC BLOOD PRESSURE: 95 MMHG

## 2022-04-14 DIAGNOSIS — M47.817 LUMBOSACRAL SPONDYLOSIS WITHOUT MYELOPATHY: ICD-10-CM

## 2022-04-14 PROCEDURE — 64494 PR INJ DX/THER AGNT PARAVERT FACET JOINT,IMG GUIDE,LUMBAR/SAC, 2ND LEVEL: ICD-10-PCS | Mod: 50,,, | Performed by: PAIN MEDICINE

## 2022-04-14 PROCEDURE — 37000008 HC ANESTHESIA 1ST 15 MINUTES: Performed by: PAIN MEDICINE

## 2022-04-14 PROCEDURE — 27000284 HC CANNULA NASAL: Performed by: NURSE ANESTHETIST, CERTIFIED REGISTERED

## 2022-04-14 PROCEDURE — 25000003 PHARM REV CODE 250: Performed by: NURSE ANESTHETIST, CERTIFIED REGISTERED

## 2022-04-14 PROCEDURE — 63600175 PHARM REV CODE 636 W HCPCS: Performed by: PAIN MEDICINE

## 2022-04-14 PROCEDURE — 64493 INJ PARAVERT F JNT L/S 1 LEV: CPT | Mod: 50,,, | Performed by: PAIN MEDICINE

## 2022-04-14 PROCEDURE — 64493 INJ PARAVERT F JNT L/S 1 LEV: CPT | Mod: 50 | Performed by: PAIN MEDICINE

## 2022-04-14 PROCEDURE — 64494 INJ PARAVERT F JNT L/S 2 LEV: CPT | Mod: 50,,, | Performed by: PAIN MEDICINE

## 2022-04-14 PROCEDURE — D9220A PRA ANESTHESIA: ICD-10-PCS | Mod: ,,, | Performed by: NURSE ANESTHETIST, CERTIFIED REGISTERED

## 2022-04-14 PROCEDURE — D9220A PRA ANESTHESIA: Mod: ,,, | Performed by: NURSE ANESTHETIST, CERTIFIED REGISTERED

## 2022-04-14 PROCEDURE — 64493 PR INJ DX/THER AGNT PARAVERT FACET JOINT,IMG GUIDE,LUMBAR/SAC,1ST LVL: ICD-10-PCS | Mod: 50,,, | Performed by: PAIN MEDICINE

## 2022-04-14 PROCEDURE — 64495 INJ PARAVERT F JNT L/S 3 LEV: CPT | Mod: 50,GZ,, | Performed by: PAIN MEDICINE

## 2022-04-14 PROCEDURE — 01992 ANES DX/THER NRV BLK&INJ PRN: CPT | Performed by: PAIN MEDICINE

## 2022-04-14 PROCEDURE — 63600175 PHARM REV CODE 636 W HCPCS: Performed by: NURSE ANESTHETIST, CERTIFIED REGISTERED

## 2022-04-14 PROCEDURE — 64494 INJ PARAVERT F JNT L/S 2 LEV: CPT | Mod: 50 | Performed by: PAIN MEDICINE

## 2022-04-14 PROCEDURE — 64495 INJ PARAVERT F JNT L/S 3 LEV: CPT | Mod: 50 | Performed by: PAIN MEDICINE

## 2022-04-14 PROCEDURE — 25000003 PHARM REV CODE 250: Performed by: PAIN MEDICINE

## 2022-04-14 PROCEDURE — 64495 PR INJ DX/THER AGNT PARAVERT FACET JOINT,IMG GUIDE,LUMBAR/SAC, ADD LEVEL: ICD-10-PCS | Mod: 50,GZ,, | Performed by: PAIN MEDICINE

## 2022-04-14 PROCEDURE — 37000009 HC ANESTHESIA EA ADD 15 MINS: Performed by: PAIN MEDICINE

## 2022-04-14 PROCEDURE — 27201423 OPTIME MED/SURG SUP & DEVICES STERILE SUPPLY: Performed by: PAIN MEDICINE

## 2022-04-14 RX ORDER — BUPIVACAINE HYDROCHLORIDE 2.5 MG/ML
INJECTION, SOLUTION INFILTRATION; PERINEURAL
Status: DISCONTINUED | OUTPATIENT
Start: 2022-04-14 | End: 2022-04-14 | Stop reason: HOSPADM

## 2022-04-14 RX ORDER — SODIUM CHLORIDE 9 MG/ML
INJECTION, SOLUTION INTRAVENOUS CONTINUOUS
Status: DISCONTINUED | OUTPATIENT
Start: 2022-04-14 | End: 2022-04-14 | Stop reason: HOSPADM

## 2022-04-14 RX ORDER — PROPOFOL 10 MG/ML
VIAL (ML) INTRAVENOUS
Status: DISCONTINUED | OUTPATIENT
Start: 2022-04-14 | End: 2022-04-14

## 2022-04-14 RX ORDER — LIDOCAINE HYDROCHLORIDE 20 MG/ML
INJECTION, SOLUTION EPIDURAL; INFILTRATION; INTRACAUDAL; PERINEURAL
Status: DISCONTINUED | OUTPATIENT
Start: 2022-04-14 | End: 2022-04-14

## 2022-04-14 RX ORDER — TRIAMCINOLONE ACETONIDE 40 MG/ML
INJECTION, SUSPENSION INTRA-ARTICULAR; INTRAMUSCULAR
Status: DISCONTINUED | OUTPATIENT
Start: 2022-04-14 | End: 2022-04-14 | Stop reason: HOSPADM

## 2022-04-14 RX ADMIN — LIDOCAINE HYDROCHLORIDE 100 MG: 20 INJECTION, SOLUTION INTRAVENOUS at 09:04

## 2022-04-14 RX ADMIN — PROPOFOL 200 MG: 10 INJECTION, EMULSION INTRAVENOUS at 09:04

## 2022-04-14 NOTE — BRIEF OP NOTE
Discharge Note  Short Stay    Admit Date: 4/14/2022    Discharge Date: 4/14/2022    Attending Physician: Angie Farah     Discharge Provider: Angie Farah    Diagnoses:Lumbosacral spondylosis    Discharged Condition: Good    Final Diagnoses: Lumbosacral spondylosis without myelopathy [M47.817]    Disposition: Home or Self Care    Hospital Course: No complications, uneventful    Outcome of Hospitalization, Treatment, Procedure, or Surgery:  Patient was admitted for outpatient interventional pain management procedure. The patient tolerated the procedure well with no complications.    Follow up/Patient Instructions:  Follow up as scheduled in Pain Management office in 3-4 weeks.  Patient has received instructions and follow up date and time.    Medications:  Continue previous medications

## 2022-04-14 NOTE — TRANSFER OF CARE
"Anesthesia Transfer of Care Note    Patient: Sharee Elizondo    Procedure(s) Performed: Procedure(s) (LRB):  Block-nerve-medial branch-lumbar, bilateral L4 through S1 (Bilateral)    Patient location: PACU    Anesthesia Type: general    Transport from OR: Transported from OR on room air with adequate spontaneous ventilation    Post pain: adequate analgesia    Post assessment: no apparent anesthetic complications and tolerated procedure well    Post vital signs: stable    Level of consciousness: awake    Nausea/Vomiting: no nausea    Complications: none    Transfer of care protocol was followed      Last vitals:   Visit Vitals  /75 (BP Location: Right arm, Patient Position: Lying)   Pulse 85   Temp 37 °C (98.6 °F)   Resp 16   Ht 5' 3" (1.6 m)   Wt 68 kg (150 lb)   SpO2 100%   Breastfeeding No   BMI 26.57 kg/m²     "

## 2022-04-14 NOTE — ANESTHESIA PREPROCEDURE EVALUATION
04/14/2022  Sharee Elizondo is a 82 y.o., female.      Pre-op Assessment    I have reviewed the Patient Summary Reports.     I have reviewed the Nursing Notes. I have reviewed the NPO Status.   I have reviewed the Medications.     Review of Systems  Anesthesia Hx:  No problems with previous Anesthesia  Neg history of prior surgery.   Cardiovascular:   CAD   CHF    Pulmonary:   COPD    Renal/:   Chronic Renal Disease    Hepatic/GI:   Hiatal Hernia, GERD, well controlled    Musculoskeletal:   Arthritis     Neurological:   Neuromuscular Disease,    Endocrine:   Diabetes, well controlled, type 2 Hypothyroidism    Psych:   Psychiatric History depression          Physical Exam  General: Well nourished    Airway:  Mallampati: I   Mouth Opening: Normal  TM Distance: Normal  Tongue: Normal  Neck ROM: Normal ROM        Anesthesia Plan  Type of Anesthesia, risks & benefits discussed:    Anesthesia Type: Gen Natural Airway  Intra-op Monitoring Plan: Standard ASA Monitors  Post Op Pain Control Plan: multimodal analgesia  Induction:  IV  Airway Plan: Direct  Informed Consent: Informed consent signed with the Patient and all parties understand the risks and agree with anesthesia plan.  All questions answered. Patient consented to blood products? Yes  ASA Score: 3  Day of Surgery Review of History & Physical: H&P Update referred to the surgeon/provider.    Ready For Surgery From Anesthesia Perspective.     .

## 2022-04-14 NOTE — PLAN OF CARE
Plan:  D/c pt via wheelchair at 1050  Informed pt if does not void in 8 hours to go to ER. Notify if redness, drainage, from injection site or fever over next 3-4 days. Rest and drink plenty of fluids for the remainder of the day. No lifting over 5 lbs. For the remainder of the day. Continue regular medications as prescribed. May take pain medications as prescribed.     Pain improved 100%

## 2022-04-14 NOTE — OP NOTE
Procedure Note    Procedure Date: 4/14/2022    Procedure Performed:Bilateral Lumbar Facet  Medial Branch Block @ L3-4,4-5,5-S1 with Fluoroscopic Guidance    Indications: Patient has failed conservative therapy.      Pre-op diagnosis: Lumbar Spondylosis    Post-op diagnosis: same    Physician: NEO Farah MD    Anesthesia: MAC    Estimated Blood Loss: Less than 1cc    IVF: Per Anesthesia    Complications: None    Technique:  The patient was interviewed in the holding area and Risks/Benefits were discussed, including but not limited to  the possibility of new or different pain, bleeding or infection.   All questions were answered.  The patient understood and accepted risks.  Consent was reviewed and signed.  A time out was taken to identify the patient, procedure and side of the procedure. The patient was placed in a prone position, then prepped and draped in the usual sterile fashion using ChloraPrep and sterile towels.  The levels were determined under fluoroscopic guidance and then marked.  1% Lidocaine was given by raising a skin wheal at the skin over each site and then infiltrated.  A 22-gauge 3.5 inch needle was introduced to the anatomic location of the bilateral L3-4,4-5,5-D2yclghy branch nerves.  Then, after negative aspiration, 1 cc of a 1:1 mixture of  (Bupivacaine 0.25% 3cc , 1% lidocaine 2 cc and  40mg kenalog ) was injected @ each level.The patient tolerated the procedure well and was transferred to the [facility] in stable condition.  The patient was monitored after the procedure.  Patient was given post procedure and discharge instructions to follow at home.  The patient was discharged in a stable condition with an adult .

## 2022-04-14 NOTE — ANESTHESIA POSTPROCEDURE EVALUATION
Anesthesia Post Evaluation    Patient: Sharee Elizondo    Procedure(s) Performed: Procedure(s) (LRB):  Block-nerve-medial branch-lumbar, bilateral L4 through S1 (Bilateral)    Final Anesthesia Type: general      Patient location during evaluation: PACU  Patient participation: Yes- Able to Participate  Level of consciousness: awake and alert  Post-procedure vital signs: reviewed and stable  Pain management: adequate  Airway patency: patent    PONV status at discharge: No PONV  Anesthetic complications: no      Cardiovascular status: blood pressure returned to baseline  Respiratory status: unassisted and spontaneous ventilation  Hydration status: euvolemic  Follow-up not needed.          Vitals Value Taken Time   BP 91/43 04/14/22 1010   Temp 37 °C (98.6 °F) 04/14/22 1006   Pulse 75 04/14/22 1010   Resp 16 04/14/22 1010   SpO2 96 % 04/14/22 1010   Vitals shown include unvalidated device data.      No case tracking events are documented in the log.      Pain/Christiano Score: No data recorded

## 2022-04-28 ENCOUNTER — OFFICE VISIT (OUTPATIENT)
Dept: PAIN MEDICINE | Facility: CLINIC | Age: 82
End: 2022-04-28
Payer: COMMERCIAL

## 2022-04-28 VITALS
HEART RATE: 83 BPM | HEIGHT: 63 IN | RESPIRATION RATE: 16 BRPM | BODY MASS INDEX: 26.4 KG/M2 | DIASTOLIC BLOOD PRESSURE: 61 MMHG | SYSTOLIC BLOOD PRESSURE: 107 MMHG | WEIGHT: 149 LBS

## 2022-04-28 DIAGNOSIS — M47.817 LUMBOSACRAL SPONDYLOSIS WITHOUT MYELOPATHY: Primary | Chronic | ICD-10-CM

## 2022-04-28 DIAGNOSIS — M47.812 SPONDYLOSIS OF CERVICAL JOINT WITHOUT MYELOPATHY: Chronic | ICD-10-CM

## 2022-04-28 DIAGNOSIS — M89.49 OSTEOARTHROSIS MULTIPLE SITES, NOT SPECIFIED AS GENERALIZED: Chronic | ICD-10-CM

## 2022-04-28 PROCEDURE — 99214 PR OFFICE/OUTPT VISIT, EST, LEVL IV, 30-39 MIN: ICD-10-PCS | Mod: S$PBB,,, | Performed by: PHYSICIAN ASSISTANT

## 2022-04-28 PROCEDURE — 3074F PR MOST RECENT SYSTOLIC BLOOD PRESSURE < 130 MM HG: ICD-10-PCS | Mod: CPTII,,, | Performed by: PHYSICIAN ASSISTANT

## 2022-04-28 PROCEDURE — 1101F PR PT FALLS ASSESS DOC 0-1 FALLS W/OUT INJ PAST YR: ICD-10-PCS | Mod: CPTII,,, | Performed by: PHYSICIAN ASSISTANT

## 2022-04-28 PROCEDURE — 3078F PR MOST RECENT DIASTOLIC BLOOD PRESSURE < 80 MM HG: ICD-10-PCS | Mod: CPTII,,, | Performed by: PHYSICIAN ASSISTANT

## 2022-04-28 PROCEDURE — 1125F PR PAIN SEVERITY QUANTIFIED, PAIN PRESENT: ICD-10-PCS | Mod: CPTII,,, | Performed by: PHYSICIAN ASSISTANT

## 2022-04-28 PROCEDURE — 99214 OFFICE O/P EST MOD 30 MIN: CPT | Mod: S$PBB,,, | Performed by: PHYSICIAN ASSISTANT

## 2022-04-28 PROCEDURE — 3288F FALL RISK ASSESSMENT DOCD: CPT | Mod: CPTII,,, | Performed by: PHYSICIAN ASSISTANT

## 2022-04-28 PROCEDURE — 1159F MED LIST DOCD IN RCRD: CPT | Mod: CPTII,,, | Performed by: PHYSICIAN ASSISTANT

## 2022-04-28 PROCEDURE — 99215 OFFICE O/P EST HI 40 MIN: CPT | Mod: PBBFAC | Performed by: PHYSICIAN ASSISTANT

## 2022-04-28 PROCEDURE — 1125F AMNT PAIN NOTED PAIN PRSNT: CPT | Mod: CPTII,,, | Performed by: PHYSICIAN ASSISTANT

## 2022-04-28 PROCEDURE — 1159F PR MEDICATION LIST DOCUMENTED IN MEDICAL RECORD: ICD-10-PCS | Mod: CPTII,,, | Performed by: PHYSICIAN ASSISTANT

## 2022-04-28 PROCEDURE — 3078F DIAST BP <80 MM HG: CPT | Mod: CPTII,,, | Performed by: PHYSICIAN ASSISTANT

## 2022-04-28 PROCEDURE — 1101F PT FALLS ASSESS-DOCD LE1/YR: CPT | Mod: CPTII,,, | Performed by: PHYSICIAN ASSISTANT

## 2022-04-28 PROCEDURE — 3288F PR FALLS RISK ASSESSMENT DOCUMENTED: ICD-10-PCS | Mod: CPTII,,, | Performed by: PHYSICIAN ASSISTANT

## 2022-04-28 PROCEDURE — 3074F SYST BP LT 130 MM HG: CPT | Mod: CPTII,,, | Performed by: PHYSICIAN ASSISTANT

## 2022-04-28 RX ORDER — GABAPENTIN 100 MG/1
100 CAPSULE ORAL EVERY 8 HOURS
Qty: 270 CAPSULE | Refills: 2 | Status: SHIPPED | OUTPATIENT
Start: 2022-04-28 | End: 2022-07-26 | Stop reason: SDUPTHER

## 2022-04-28 RX ORDER — HYDROCODONE BITARTRATE AND ACETAMINOPHEN 5; 325 MG/1; MG/1
1 TABLET ORAL EVERY 8 HOURS
Qty: 90 TABLET | Refills: 0 | Status: SHIPPED | OUTPATIENT
Start: 2022-05-28 | End: 2022-06-27

## 2022-04-28 RX ORDER — HYDROCODONE BITARTRATE AND ACETAMINOPHEN 5; 325 MG/1; MG/1
1 TABLET ORAL EVERY 8 HOURS
Qty: 90 TABLET | Refills: 0 | Status: SHIPPED | OUTPATIENT
Start: 2022-04-28 | End: 2022-05-28

## 2022-04-28 RX ORDER — HYDROCODONE BITARTRATE AND ACETAMINOPHEN 5; 325 MG/1; MG/1
1 TABLET ORAL EVERY 8 HOURS
Qty: 90 TABLET | Refills: 0 | Status: SHIPPED | OUTPATIENT
Start: 2022-06-27 | End: 2022-07-26 | Stop reason: SDUPTHER

## 2022-04-28 NOTE — PROGRESS NOTES
Disclaimer:  This note has been generated using voice recognition software.  There may be type of graft focal areas that have been missed during a proof reading      Subjective:      Patient ID: Sharee Elizondo is a 82 y.o. female.    Chief Complaint: Low-back Pain      Low-back Pain  This is a chronic problem. The current episode started more than 1 year ago. The problem occurs daily. The problem has been gradually improving since onset. Associated symptoms include leg pain. Pertinent negatives include no bladder incontinence, chest pain, dysuria or fever.   Pain  This is a chronic problem. The current episode started more than 1 year ago. The problem occurs daily. The problem has been unchanged. Associated symptoms include arthralgias. Pertinent negatives include no change in bowel habit, chest pain, chills, coughing, diaphoresis, fever, neck pain, rash, sore throat, vertigo or vomiting.     Review of Systems   Constitutional: Negative for activity change, appetite change, chills, diaphoresis and fever.   HENT: Negative for drooling, ear pain, facial swelling, mouth dryness, nosebleeds, sore throat, trouble swallowing, voice change and goiter.    Eyes: Negative for photophobia, pain, discharge, redness and visual disturbance.   Respiratory: Negative for apnea, cough, choking, chest tightness, shortness of breath, wheezing and stridor.    Cardiovascular: Negative for chest pain, palpitations and leg swelling.   Gastrointestinal: Negative for abdominal distention, change in bowel habit, diarrhea, rectal pain, vomiting, fecal incontinence and change in bowel habit.   Endocrine: Negative for cold intolerance, heat intolerance, polydipsia, polyphagia and polyuria.   Genitourinary: Negative for bladder incontinence, dysuria, flank pain, frequency and hot flashes.   Musculoskeletal: Positive for arthralgias, back pain, gait problem and leg pain. Negative for neck pain and neck stiffness.   Integumentary:  Negative for color  "change, pallor and rash.   Allergic/Immunologic: Negative for immunocompromised state.   Neurological: Negative for dizziness, vertigo, seizures, syncope, facial asymmetry, speech difficulty, light-headedness, disturbances in coordination, memory loss and coordination difficulties.   Hematological: Negative for adenopathy. Does not bruise/bleed easily.   Psychiatric/Behavioral: Negative for agitation, behavioral problems, confusion, decreased concentration, dysphoric mood, hallucinations, self-injury and suicidal ideas. The patient is not nervous/anxious and is not hyperactive.             Objective:  Vitals:    04/28/22 0954   BP: 107/61   Pulse: 83   Resp: 16   Weight: 67.6 kg (149 lb)   Height: 5' 3" (1.6 m)   PainSc:   4         Physical Exam  Vitals and nursing note reviewed. Exam conducted with a chaperone present.   Constitutional:       General: She is awake. She is not in acute distress.     Appearance: Normal appearance. She is not toxic-appearing.   HENT:      Head: Normocephalic and atraumatic.      Nose: Nose normal.      Mouth/Throat:      Mouth: Mucous membranes are moist.      Pharynx: Oropharynx is clear.   Eyes:      Conjunctiva/sclera: Conjunctivae normal.      Pupils: Pupils are equal, round, and reactive to light.   Cardiovascular:      Rate and Rhythm: Normal rate.   Pulmonary:      Effort: Pulmonary effort is normal. No respiratory distress.   Abdominal:      Palpations: Abdomen is soft.      Tenderness: There is no guarding.   Musculoskeletal:         General: Normal range of motion.      Cervical back: Normal range of motion and neck supple. Tenderness present. No rigidity.      Thoracic back: Tenderness present.      Lumbar back: Tenderness present.   Skin:     General: Skin is warm and dry.      Coloration: Skin is not jaundiced or pale.   Neurological:      General: No focal deficit present.      Mental Status: She is alert and oriented to person, place, and time. Mental status is at " baseline.      Cranial Nerves: Cranial nerves are intact. No cranial nerve deficit (II-XII).      Gait: Gait abnormal.   Psychiatric:         Mood and Affect: Mood normal.         Behavior: Behavior normal. Behavior is cooperative.         Thought Content: Thought content normal.           [facility]  See OP Notes for results.     IMPRESSION: See OP Notes for results.     This procedure was auto-finalized by: Virtual Radiologist       Appointment on 04/12/2022   Component Date Value Ref Range Status    SARS Coronavirus 2 Antigen 04/12/2022 Negative  Negative Final     Acceptable 04/12/2022 Yes   Final   Office Visit on 03/24/2022   Component Date Value Ref Range Status    POC Amphetamines 03/24/2022 Negative  Negative, Inconclusive Final    POC Barbiturates 03/24/2022 Negative  Negative, Inconclusive Final    POC Benzodiazepines 03/24/2022 Negative  Negative, Inconclusive Final    POC Cocaine 03/24/2022 Negative  Negative, Inconclusive Final    POC THC 03/24/2022 Negative  Negative, Inconclusive Final    POC Methadone 03/24/2022 Negative  Negative, Inconclusive Final    POC Methamphetamine 03/24/2022 Negative  Negative, Inconclusive Final    POC Opiates 03/24/2022 Presumptive Positive (A) Negative, Inconclusive Final    POC Oxycodone 03/24/2022 Negative  Negative, Inconclusive Final    POC Phencyclidine 03/24/2022 Negative  Negative, Inconclusive Final    POC Methylenedioxymethamphetamine * 03/24/2022 Negative  Negative, Inconclusive Final    POC Tricyclic Antidepressants 03/24/2022 Negative  Negative, Inconclusive Final    POC Buprenorphine 03/24/2022 Negative   Final     Acceptable 03/24/2022 Yes   Final    POC Temperature (Urine) 03/24/2022 92   Final   Hospital Outpatient Visit on 02/18/2022   Component Date Value Ref Range Status    Case Report 02/18/2022    Final                    Value:Surgical Pathology                                Case:  [id number]                                   Authorizing Provider:  Michael Long MD      Collected:           02/18/2022 01:54 PM          Ordering Location:     [facility] - Endoscopy       Received:            02/18/2022 02:34 PM          Pathologist:           David Harris MD                                                       Specimens:   A) - Stomach, A- Stomach bx                                                                         B) - Esophagus, B- Esophaugus bx                                                           Final Diagnosis 02/18/2022    Final                    Value:This result contains rich text formatting which cannot be displayed here.    Comments 02/18/2022    Final                    Value:This result contains rich text formatting which cannot be displayed here.    Gross Description 02/18/2022    Final                    Value:This result contains rich text formatting which cannot be displayed here.    Microscopic Description 02/18/2022    Final                    Value:This result contains rich text formatting which cannot be displayed here.    Laboratory Notes 02/18/2022    Final                    Value:This result contains rich text formatting which cannot be displayed here.   Appointment on 02/17/2022   Component Date Value Ref Range Status    SARS Coronavirus 2 Antigen 02/17/2022 Negative  Negative Final     Acceptable 02/17/2022 Yes   Final   Office Visit on 02/03/2022   Component Date Value Ref Range Status    Triglycerides 02/03/2022 296 (A) 35 - 150 mg/dL Final    Cholesterol 02/03/2022 294 (A) 0 - 200 mg/dL Final    HDL Cholesterol 02/03/2022 35 (A) 40 - 60 mg/dL Final    Cholesterol/HDL Ratio (Risk Factor) 02/03/2022 8.4   Final    Non-HDL 02/03/2022 259  mg/dL Final    LDL Calculated 02/03/2022 200  mg/dL Final    LDL/HDL 02/03/2022 5.7   Final    VLDL 02/03/2022 59  mg/dL Final    TSH 02/03/2022 4.950 (A) 0.358 - 3.740 uIU/mL Final     Sodium 02/03/2022 133 (A) 136 - 145 mmol/L Final    Potassium 02/03/2022 3.9  3.5 - 5.1 mmol/L Final    Chloride 02/03/2022 101  98 - 107 mmol/L Final    CO2 02/03/2022 24  21 - 32 mmol/L Final    Anion Gap 02/03/2022 12  7 - 16 mmol/L Final    Glucose 02/03/2022 106  74 - 106 mg/dL Final    BUN 02/03/2022 19 (A) 7 - 18 mg/dL Final    Creatinine 02/03/2022 1.04 (A) 0.55 - 1.02 mg/dL Final    BUN/Creatinine Ratio 02/03/2022 18  6 - 20 Final    Calcium 02/03/2022 9.5  8.5 - 10.1 mg/dL Final    Total Protein 02/03/2022 7.5  6.4 - 8.2 g/dL Final    Albumin 02/03/2022 3.7  3.5 - 5.0 g/dL Final    Globulin 02/03/2022 3.8  2.0 - 4.0 g/dL Final    A/G Ratio 02/03/2022 1.0   Final    Bilirubin, Total 02/03/2022 0.4  0.0 - 1.2 mg/dL Final    Alk Phos 02/03/2022 101  55 - 142 U/L Final    ALT 02/03/2022 15  13 - 56 U/L Final    AST 02/03/2022 10 (A) 15 - 37 U/L Final    eGFR 02/03/2022 54 (A) >=60 mL/min/1.73m² Final    WBC 02/03/2022 8.56  4.50 - 11.00 K/uL Final    RBC 02/03/2022 4.46  4.20 - 5.40 M/uL Final    Hemoglobin 02/03/2022 13.4  12.0 - 16.0 g/dL Final    Hematocrit 02/03/2022 40.0  38.0 - 47.0 % Final    MCV 02/03/2022 89.7  80.0 - 96.0 fL Final    MCH 02/03/2022 30.0  27.0 - 31.0 pg Final    MCHC 02/03/2022 33.5  32.0 - 36.0 g/dL Final    RDW 02/03/2022 13.2  11.5 - 14.5 % Final    Platelet Count 02/03/2022 311  150 - 400 K/uL Final    MPV 02/03/2022 11.3  9.4 - 12.4 fL Final    Neutrophils % 02/03/2022 58.8  53.0 - 65.0 % Final    Lymphocytes % 02/03/2022 29.4  27.0 - 41.0 % Final    Monocytes % 02/03/2022 6.4 (A) 2.0 - 6.0 % Final    Eosinophils % 02/03/2022 3.7  1.0 - 4.0 % Final    Basophils % 02/03/2022 1.3 (A) 0.0 - 1.0 % Final    Immature Granulocytes % 02/03/2022 0.4  0.0 - 0.4 % Final    nRBC, Auto 02/03/2022 0.0  <=0.0 % Final    Neutrophils, Abs 02/03/2022 5.03  1.80 - 7.70 K/uL Final    Lymphocytes, Absolute 02/03/2022 2.52  1.00 - 4.80 K/uL Final    Monocytes,  Absolute 02/03/2022 0.55  0.00 - 0.80 K/uL Final    Eosinophils, Absolute 02/03/2022 0.32  0.00 - 0.50 K/uL Final    Basophils, Absolute 02/03/2022 0.11  0.00 - 0.20 K/uL Final    Immature Granulocytes, Absolute 02/03/2022 0.03  0.00 - 0.04 K/uL Final    nRBC, Absolute 02/03/2022 0.00  <=0.00 x10e3/uL Final    Diff Type 02/03/2022 Auto   Final    Hemoglobin A1C 02/03/2022 6.4  4.5 - 6.6 % Final    Estimated Average Glucose 02/03/2022 127  mg/dL Final   Office Visit on 01/20/2022   Component Date Value Ref Range Status    SARS Coronavirus 2 Antigen 01/20/2022 Positive (A) Negative Final     Acceptable 01/20/2022 Yes   Final   Office Visit on 12/20/2021   Component Date Value Ref Range Status    POC Amphetamines 12/20/2021 Negative  Negative, Inconclusive Final    POC Barbiturates 12/20/2021 Negative  Negative, Inconclusive Final    POC Benzodiazepines 12/20/2021 Negative  Negative, Inconclusive Final    POC Cocaine 12/20/2021 Negative  Negative, Inconclusive Final    POC THC 12/20/2021 Negative  Negative, Inconclusive Final    POC Methadone 12/20/2021 Negative  Negative, Inconclusive Final    POC Methamphetamine 12/20/2021 Negative  Negative, Inconclusive Final    POC Opiates 12/20/2021 Presumptive Positive (A) Negative, Inconclusive Final    POC Oxycodone 12/20/2021 Negative  Negative, Inconclusive Final    POC Phencyclidine 12/20/2021 Negative  Negative, Inconclusive Final    POC Methylenedioxymethamphetamine * 12/20/2021 Negative  Negative, Inconclusive Final    POC Tricyclic Antidepressants 12/20/2021 Negative  Negative, Inconclusive Final    POC Buprenorphine 12/20/2021 Negative   Final     Acceptable 12/20/2021 Yes   Final    POC Temperature (Urine) 12/20/2021 92   Final           Assessment:      1. Lumbosacral spondylosis without myelopathy    2. 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 Osteoarthrosis multiple sites, not specified as  generalized          No orders of the defined types were placed in this encounter.        A's of Opioid Risk Assessment  Activity:Patient can perform ADL.   Analgesia:Patients pain is partially controlled by current medication. Patient has tried OTC medications such as Tylenol and Ibuprofen with out relief.   Adverse Effects: Patient denies constipation or sedation.  Aberrant Behavior:  reviewed with no aberrant drug seeking/taking behavior.  Overdose reversal drug naloxone discussed    Drug screen reviewed      Requested Prescriptions     Signed Prescriptions Disp Refills    HYDROcodone-acetaminophen (NORCO) 5-325 mg per tablet 90 tablet 0     Sig: Take 1 tablet by mouth every 8 (eight) hours.    gabapentin (NEURONTIN) 100 MG capsule 270 capsule 2     Sig: Take 1 capsule (100 mg total) by mouth every 8 (eight) hours.    HYDROcodone-acetaminophen (NORCO) 5-325 mg per tablet 90 tablet 0     Sig: Take 1 tablet by mouth every 8 (eight) hours.    HYDROcodone-acetaminophen (NORCO) 5-325 mg per tablet 90 tablet 0     Sig: Take 1 tablet by mouth every 8 (eight) hours.         Plan:    Follow-up after bilateral lumbar L4 through S1 medial branch block # 1, April 2022, she states she had 85% relief after procedure notes ongoing, she states that procedure did help increase her level function    She states at this point she would like to continue conservative management current medication    She will continue home exercise program as directed    Continue current medication    Follow-up 3 months    Dr. Farah, April 2023    Bring original prescription medication bottles/container/box with labels to each visit

## 2022-06-27 ENCOUNTER — OFFICE VISIT (OUTPATIENT)
Dept: FAMILY MEDICINE | Facility: CLINIC | Age: 82
End: 2022-06-27
Payer: MEDICAID

## 2022-06-27 VITALS
HEIGHT: 63 IN | RESPIRATION RATE: 18 BRPM | TEMPERATURE: 99 F | WEIGHT: 143 LBS | SYSTOLIC BLOOD PRESSURE: 107 MMHG | BODY MASS INDEX: 25.34 KG/M2 | DIASTOLIC BLOOD PRESSURE: 62 MMHG | HEART RATE: 91 BPM | OXYGEN SATURATION: 98 %

## 2022-06-27 DIAGNOSIS — E03.9 HYPOTHYROIDISM, UNSPECIFIED TYPE: ICD-10-CM

## 2022-06-27 DIAGNOSIS — M47.817 LUMBOSACRAL SPONDYLOSIS WITHOUT MYELOPATHY: Chronic | ICD-10-CM

## 2022-06-27 DIAGNOSIS — F32.A DEPRESSION, UNSPECIFIED DEPRESSION TYPE: ICD-10-CM

## 2022-06-27 DIAGNOSIS — M47.812 SPONDYLOSIS OF CERVICAL JOINT WITHOUT MYELOPATHY: Chronic | ICD-10-CM

## 2022-06-27 DIAGNOSIS — E11.9 DIABETES MELLITUS WITHOUT COMPLICATION: Primary | ICD-10-CM

## 2022-06-27 DIAGNOSIS — E78.5 DYSLIPIDEMIA: ICD-10-CM

## 2022-06-27 DIAGNOSIS — M89.49 OSTEOARTHROSIS MULTIPLE SITES, NOT SPECIFIED AS GENERALIZED: Chronic | ICD-10-CM

## 2022-06-27 PROCEDURE — 3288F FALL RISK ASSESSMENT DOCD: CPT | Mod: ,,, | Performed by: NURSE PRACTITIONER

## 2022-06-27 PROCEDURE — 1101F PT FALLS ASSESS-DOCD LE1/YR: CPT | Mod: ,,, | Performed by: NURSE PRACTITIONER

## 2022-06-27 PROCEDURE — 99214 PR OFFICE/OUTPT VISIT, EST, LEVL IV, 30-39 MIN: ICD-10-PCS | Mod: ,,, | Performed by: NURSE PRACTITIONER

## 2022-06-27 PROCEDURE — 3288F PR FALLS RISK ASSESSMENT DOCUMENTED: ICD-10-PCS | Mod: ,,, | Performed by: NURSE PRACTITIONER

## 2022-06-27 PROCEDURE — 3078F DIAST BP <80 MM HG: CPT | Mod: ,,, | Performed by: NURSE PRACTITIONER

## 2022-06-27 PROCEDURE — 99214 OFFICE O/P EST MOD 30 MIN: CPT | Mod: ,,, | Performed by: NURSE PRACTITIONER

## 2022-06-27 PROCEDURE — 3078F PR MOST RECENT DIASTOLIC BLOOD PRESSURE < 80 MM HG: ICD-10-PCS | Mod: ,,, | Performed by: NURSE PRACTITIONER

## 2022-06-27 PROCEDURE — 3074F SYST BP LT 130 MM HG: CPT | Mod: ,,, | Performed by: NURSE PRACTITIONER

## 2022-06-27 PROCEDURE — 3074F PR MOST RECENT SYSTOLIC BLOOD PRESSURE < 130 MM HG: ICD-10-PCS | Mod: ,,, | Performed by: NURSE PRACTITIONER

## 2022-06-27 PROCEDURE — 1101F PR PT FALLS ASSESS DOC 0-1 FALLS W/OUT INJ PAST YR: ICD-10-PCS | Mod: ,,, | Performed by: NURSE PRACTITIONER

## 2022-06-27 RX ORDER — ESOMEPRAZOLE MAGNESIUM 40 MG/1
40 CAPSULE, DELAYED RELEASE ORAL DAILY
Qty: 90 CAPSULE | Refills: 1 | Status: SHIPPED | OUTPATIENT
Start: 2022-06-27 | End: 2022-10-27 | Stop reason: SDUPTHER

## 2022-06-27 RX ORDER — CARVEDILOL 3.12 MG/1
3.12 TABLET ORAL 2 TIMES DAILY WITH MEALS
Qty: 30 TABLET | Refills: 1 | Status: SHIPPED | OUTPATIENT
Start: 2022-06-27 | End: 2022-07-21 | Stop reason: SDUPTHER

## 2022-06-27 RX ORDER — LISINOPRIL 5 MG/1
5 TABLET ORAL DAILY
Qty: 90 TABLET | Refills: 1 | Status: SHIPPED | OUTPATIENT
Start: 2022-06-27 | End: 2022-10-27 | Stop reason: SDUPTHER

## 2022-06-27 RX ORDER — LEVOTHYROXINE SODIUM 25 UG/1
25 TABLET ORAL
Qty: 90 TABLET | Refills: 1 | Status: SHIPPED | OUTPATIENT
Start: 2022-06-27 | End: 2022-08-05

## 2022-06-27 RX ORDER — FUROSEMIDE 40 MG/1
40 TABLET ORAL DAILY
Qty: 90 TABLET | Refills: 1 | Status: SHIPPED | OUTPATIENT
Start: 2022-06-27 | End: 2022-10-27 | Stop reason: SDUPTHER

## 2022-06-27 RX ORDER — MONTELUKAST SODIUM 10 MG/1
10 TABLET ORAL NIGHTLY
Qty: 90 TABLET | Refills: 1 | Status: SHIPPED | OUTPATIENT
Start: 2022-06-27 | End: 2022-10-27 | Stop reason: SDUPTHER

## 2022-06-27 RX ORDER — CARVEDILOL 3.12 MG/1
3.12 TABLET ORAL 2 TIMES DAILY WITH MEALS
Qty: 90 TABLET | Refills: 1 | Status: SHIPPED | OUTPATIENT
Start: 2022-06-27 | End: 2022-06-27 | Stop reason: SDUPTHER

## 2022-06-27 RX ORDER — AMITRIPTYLINE HYDROCHLORIDE 25 MG/1
25 TABLET, FILM COATED ORAL NIGHTLY PRN
Qty: 90 TABLET | Refills: 1 | Status: SHIPPED | OUTPATIENT
Start: 2022-06-27 | End: 2022-10-27 | Stop reason: SDUPTHER

## 2022-06-27 RX ORDER — SERTRALINE HYDROCHLORIDE 50 MG/1
50 TABLET, FILM COATED ORAL DAILY
Qty: 90 TABLET | Refills: 1 | Status: SHIPPED | OUTPATIENT
Start: 2022-06-27 | End: 2022-10-27 | Stop reason: SDUPTHER

## 2022-06-27 RX ORDER — ROSUVASTATIN CALCIUM 40 MG/1
40 TABLET, COATED ORAL NIGHTLY
Qty: 90 TABLET | Refills: 1 | Status: SHIPPED | OUTPATIENT
Start: 2022-06-27 | End: 2022-10-27 | Stop reason: SDUPTHER

## 2022-06-27 RX ORDER — METHOCARBAMOL 500 MG/1
500 TABLET, FILM COATED ORAL 2 TIMES DAILY
Qty: 180 TABLET | Refills: 1 | Status: SHIPPED | OUTPATIENT
Start: 2022-06-27 | End: 2022-10-27 | Stop reason: SDUPTHER

## 2022-06-27 RX ORDER — SPIRONOLACTONE 25 MG/1
25 TABLET ORAL DAILY
Qty: 90 TABLET | Refills: 1 | Status: SHIPPED | OUTPATIENT
Start: 2022-06-27 | End: 2022-10-27 | Stop reason: SDUPTHER

## 2022-06-27 RX ORDER — ICOSAPENT ETHYL 500 MG/1
1 CAPSULE ORAL 2 TIMES DAILY
Qty: 180 CAPSULE | Refills: 1 | Status: SHIPPED | OUTPATIENT
Start: 2022-06-27 | End: 2022-10-27 | Stop reason: SDUPTHER

## 2022-06-27 RX ORDER — DULAGLUTIDE 1.5 MG/.5ML
1.5 INJECTION, SOLUTION SUBCUTANEOUS
Qty: 4 PEN | Refills: 3 | Status: SHIPPED | OUTPATIENT
Start: 2022-06-27 | End: 2022-09-06

## 2022-07-07 NOTE — PROGRESS NOTES
Subjective:       Patient ID: Sharee Elizondo is a 82 y.o. female.    Chief Complaint: No chief complaint on file.    Ms. Elizondo presents to clinic accompanied by her daughter, she is requesting medication refills and due for labs. She denies complaints, states that her energy level and activity level have remained stable. She helps at home with her grandchildren and great grandchildren. PMH includes HLD, DM, GERD, depression, chronic pain.    Review of Systems   Constitutional: Negative.    Respiratory: Negative.    Cardiovascular: Negative.    Gastrointestinal: Negative.    Genitourinary: Negative.    Neurological: Negative.    Psychiatric/Behavioral: Negative.          Objective:      Physical Exam  Vitals and nursing note reviewed.   Constitutional:       Appearance: Normal appearance.   HENT:      Head: Normocephalic.   Cardiovascular:      Rate and Rhythm: Normal rate and regular rhythm.      Pulses: Normal pulses.      Heart sounds: Normal heart sounds.   Pulmonary:      Effort: Pulmonary effort is normal.      Breath sounds: Normal breath sounds.   Abdominal:      General: Bowel sounds are normal.      Palpations: Abdomen is soft.   Musculoskeletal:         General: Normal range of motion.   Skin:     General: Skin is warm and dry.   Neurological:      Mental Status: She is alert and oriented to person, place, and time.   Psychiatric:         Behavior: Behavior normal.         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 (Chronic)    Lumbosacral spondylosis without myelopathy (Chronic)       Orthopedic    Osteoarthrosis multiple sites, not specified as generalized (Chronic)      Other Visit Diagnoses     Diabetes mellitus without complication    -  Primary    Relevant Medications    dulaglutide (TRULICITY) 1.5 mg/0.5 mL pen injector    Other Relevant Orders    CBC Auto Differential    Comprehensive Metabolic Panel    Hemoglobin A1C     Depression, unspecified depression type        Relevant Medications    sertraline (ZOLOFT) 50 MG tablet    Dyslipidemia        Relevant Orders    Lipid Panel    Hypothyroidism, unspecified type        Relevant Orders    TSH          Plan:        Fasting labs today, medication refills.   Schedule follow up in 3-4 months.

## 2022-07-23 RX ORDER — CARVEDILOL 3.12 MG/1
3.12 TABLET ORAL 2 TIMES DAILY WITH MEALS
Qty: 30 TABLET | Refills: 1 | Status: SHIPPED | OUTPATIENT
Start: 2022-07-23 | End: 2022-08-03 | Stop reason: SDUPTHER

## 2022-07-25 ENCOUNTER — TELEPHONE (OUTPATIENT)
Dept: FAMILY MEDICINE | Facility: CLINIC | Age: 82
End: 2022-07-25
Payer: MEDICARE

## 2022-07-25 NOTE — TELEPHONE ENCOUNTER
----- Message from JOSELITO Mora sent at 7/23/2022  8:31 AM CDT -----  TSH is too high, is she taking medication? IF yes, need to adjust dose and repeat in 6 weeks.    A1C is good and cholesterol improved.

## 2022-07-25 NOTE — TELEPHONE ENCOUNTER
Pt notified and verbalized understanding. Yazmin reports pt is taking synthroid as prescribed. Transferred to scheduling.

## 2022-07-26 ENCOUNTER — OFFICE VISIT (OUTPATIENT)
Dept: PAIN MEDICINE | Facility: CLINIC | Age: 82
End: 2022-07-26
Payer: MEDICARE

## 2022-07-26 VITALS
BODY MASS INDEX: 24.59 KG/M2 | SYSTOLIC BLOOD PRESSURE: 101 MMHG | HEART RATE: 87 BPM | WEIGHT: 144 LBS | HEIGHT: 64 IN | DIASTOLIC BLOOD PRESSURE: 64 MMHG

## 2022-07-26 DIAGNOSIS — Z79.899 ENCOUNTER FOR LONG-TERM (CURRENT) USE OF OTHER MEDICATIONS: ICD-10-CM

## 2022-07-26 DIAGNOSIS — M54.16 LUMBAR RADICULOPATHY, CHRONIC: Primary | ICD-10-CM

## 2022-07-26 DIAGNOSIS — M89.49 OSTEOARTHROSIS MULTIPLE SITES, NOT SPECIFIED AS GENERALIZED: Chronic | ICD-10-CM

## 2022-07-26 DIAGNOSIS — M47.817 LUMBOSACRAL SPONDYLOSIS WITHOUT MYELOPATHY: Chronic | ICD-10-CM

## 2022-07-26 DIAGNOSIS — M47.812 SPONDYLOSIS OF CERVICAL JOINT WITHOUT MYELOPATHY: Chronic | ICD-10-CM

## 2022-07-26 DIAGNOSIS — M54.9 DORSALGIA, UNSPECIFIED: ICD-10-CM

## 2022-07-26 DIAGNOSIS — M54.16 LUMBAR RADICULOPATHY: Chronic | ICD-10-CM

## 2022-07-26 PROCEDURE — 1101F PR PT FALLS ASSESS DOC 0-1 FALLS W/OUT INJ PAST YR: ICD-10-PCS | Mod: CPTII,,, | Performed by: PHYSICIAN ASSISTANT

## 2022-07-26 PROCEDURE — 1125F PR PAIN SEVERITY QUANTIFIED, PAIN PRESENT: ICD-10-PCS | Mod: CPTII,,, | Performed by: PHYSICIAN ASSISTANT

## 2022-07-26 PROCEDURE — 3288F FALL RISK ASSESSMENT DOCD: CPT | Mod: CPTII,,, | Performed by: PHYSICIAN ASSISTANT

## 2022-07-26 PROCEDURE — 3078F PR MOST RECENT DIASTOLIC BLOOD PRESSURE < 80 MM HG: ICD-10-PCS | Mod: CPTII,,, | Performed by: PHYSICIAN ASSISTANT

## 2022-07-26 PROCEDURE — 3078F DIAST BP <80 MM HG: CPT | Mod: CPTII,,, | Performed by: PHYSICIAN ASSISTANT

## 2022-07-26 PROCEDURE — 1159F PR MEDICATION LIST DOCUMENTED IN MEDICAL RECORD: ICD-10-PCS | Mod: CPTII,,, | Performed by: PHYSICIAN ASSISTANT

## 2022-07-26 PROCEDURE — 3074F PR MOST RECENT SYSTOLIC BLOOD PRESSURE < 130 MM HG: ICD-10-PCS | Mod: CPTII,,, | Performed by: PHYSICIAN ASSISTANT

## 2022-07-26 PROCEDURE — 3074F SYST BP LT 130 MM HG: CPT | Mod: CPTII,,, | Performed by: PHYSICIAN ASSISTANT

## 2022-07-26 PROCEDURE — 1125F AMNT PAIN NOTED PAIN PRSNT: CPT | Mod: CPTII,,, | Performed by: PHYSICIAN ASSISTANT

## 2022-07-26 PROCEDURE — 99214 PR OFFICE/OUTPT VISIT, EST, LEVL IV, 30-39 MIN: ICD-10-PCS | Mod: S$PBB,,, | Performed by: PHYSICIAN ASSISTANT

## 2022-07-26 PROCEDURE — 1101F PT FALLS ASSESS-DOCD LE1/YR: CPT | Mod: CPTII,,, | Performed by: PHYSICIAN ASSISTANT

## 2022-07-26 PROCEDURE — 99215 OFFICE O/P EST HI 40 MIN: CPT | Mod: PBBFAC | Performed by: PHYSICIAN ASSISTANT

## 2022-07-26 PROCEDURE — 1159F MED LIST DOCD IN RCRD: CPT | Mod: CPTII,,, | Performed by: PHYSICIAN ASSISTANT

## 2022-07-26 PROCEDURE — 80305 DRUG TEST PRSMV DIR OPT OBS: CPT | Mod: PBBFAC | Performed by: PHYSICIAN ASSISTANT

## 2022-07-26 PROCEDURE — 3288F PR FALLS RISK ASSESSMENT DOCUMENTED: ICD-10-PCS | Mod: CPTII,,, | Performed by: PHYSICIAN ASSISTANT

## 2022-07-26 PROCEDURE — 99214 OFFICE O/P EST MOD 30 MIN: CPT | Mod: S$PBB,,, | Performed by: PHYSICIAN ASSISTANT

## 2022-07-26 RX ORDER — GABAPENTIN 100 MG/1
100 CAPSULE ORAL EVERY 8 HOURS
Qty: 270 CAPSULE | Refills: 2 | Status: SHIPPED | OUTPATIENT
Start: 2022-07-26 | End: 2022-09-20 | Stop reason: SDUPTHER

## 2022-07-26 RX ORDER — HYDROCODONE BITARTRATE AND ACETAMINOPHEN 5; 325 MG/1; MG/1
1 TABLET ORAL EVERY 8 HOURS
Qty: 90 TABLET | Refills: 0 | Status: SHIPPED | OUTPATIENT
Start: 2022-07-27 | End: 2022-08-16 | Stop reason: SDUPTHER

## 2022-07-26 NOTE — PROGRESS NOTES
Subjective:         Patient ID: Sharee Elizondo is a 82 y.o. female.    Chief Complaint: Back Pain      Low-back Pain  This is a chronic problem. The current episode started more than 1 year ago. The problem occurs daily. The problem has been gradually improving since onset. Associated symptoms include leg pain. Pertinent negatives include no bladder incontinence, chest pain, dysuria or fever.   Pain  This is a chronic problem. The current episode started more than 1 year ago. The problem occurs daily. The problem has been unchanged. Associated symptoms include arthralgias. Pertinent negatives include no change in bowel habit, chest pain, chills, coughing, diaphoresis, fever, neck pain, rash, sore throat, vertigo or vomiting.     Review of Systems   Constitutional: Negative for activity change, appetite change, chills, diaphoresis and fever.   HENT: Negative for drooling, ear pain, facial swelling, mouth dryness, nosebleeds, sore throat, trouble swallowing, voice change and goiter.    Eyes: Negative for photophobia, pain, discharge, redness and visual disturbance.   Respiratory: Negative for apnea, cough, choking, chest tightness, shortness of breath, wheezing and stridor.    Cardiovascular: Negative for chest pain, palpitations and leg swelling.   Gastrointestinal: Negative for abdominal distention, change in bowel habit, diarrhea, rectal pain, vomiting, fecal incontinence and change in bowel habit.   Endocrine: Negative for cold intolerance, heat intolerance, polydipsia, polyphagia and polyuria.   Genitourinary: Negative for bladder incontinence, dysuria, flank pain, frequency and hot flashes.   Musculoskeletal: Positive for arthralgias, back pain, gait problem and leg pain. Negative for neck pain and neck stiffness.   Integumentary:  Negative for color change, pallor and rash.   Allergic/Immunologic: Negative for immunocompromised state.   Neurological: Negative for dizziness, vertigo, seizures, syncope, facial  asymmetry, speech difficulty, light-headedness, disturbances in coordination, memory loss and coordination difficulties.   Hematological: Negative for adenopathy. Does not bruise/bleed easily.   Psychiatric/Behavioral: Negative for agitation, behavioral problems, confusion, decreased concentration, dysphoric mood, hallucinations, self-injury and suicidal ideas. The patient is not nervous/anxious and is not hyperactive.            Past Medical History:   Diagnosis Date    Acute superficial gastritis without hemorrhage 2/18/2022    Atrial fibrillation     Automatic implantable cardiac defibrillator in situ     Bilateral primary osteoarthritis of knee     Cervical spondylosis without myelopathy     CHF (congestive heart failure)     Chronic ischemic heart disease     Chronic kidney disease (CKD), stage III (moderate)     Chronic pain syndrome     COPD (chronic obstructive pulmonary disease)     Coronary arteriosclerosis     Depressive disorder     Esophageal dysphagia 2/18/2022    GERD (gastroesophageal reflux disease)     HH (hiatus hernia) 2/18/2022    Hypothyroidism     Low back pain     Lumbar radiculopathy     Melanoma 2019    scalp    Metabolic syndrome     Myocardial ischemia     Other long term (current) drug therapy     Radiculopathy, cervical region     Type 2 diabetes mellitus      Past Surgical History:   Procedure Laterality Date    BACK SURGERY      bilateral knee injection/ aspiration of joint/ bursa-large  Bilateral 4/4/2017 3/27/2017 3/20/2017    bilateral knee orthovisc injection    CARDIAC SURGERY      EPIDURAL STEROID INJECTION INTO CERVICAL SPINE  6/19/2019 5/17/2017 4/26/2017    C6-7 Dr Gagan FLEMING    EPIDURAL STEROID INJECTION INTO LUMBAR SPINE  03/15/2018    L4-5 LONNIEDr Farah    HYSTERECTOMY      INJECTION OF ANESTHETIC AGENT AROUND MEDIAL BRANCH NERVES INNERVATING LUMBAR FACET JOINT Bilateral 4/14/2022    Procedure: Block-nerve-medial branch-lumbar, bilateral L4  through S1;  Surgeon: Angie Farah MD;  Location: [facility];  Service: Pain Management;  Laterality: Bilateral;  pt aware at visit to be tested  4/12 patient getting covid test today    INJECTION OF FACET JOINT Bilateral 8/17/2017 7/19/2017    Bilateral C3-7 FI, Dr Farah    INJECTION OF FACET JOINT Left 10/18/2018    left C3-7 FI, Dr Farah    INJECTION OF FACET JOINT Bilateral 8/22/2018 6/29/2018    bilateral L3-S1 FI, Dr Farah    RADIOFREQUENCY ABLATION Right 12/12/2018    Right C3-7 RF, Dr Farah    RADIOFREQUENCY ABLATION Left 01/9/2019 10/16/2017 10/7/2020    Left C3-7 RF, Dr Farah    RADIOFREQUENCY ABLATION Left 1/9/2019 10/16/2017    left C3-7 RF, Dr Farah    RADIOFREQUENCY THERMAL COAGULATION OF MEDIAL BRANCH OF POSTERIOR RAMUS OF CERVICAL SPINAL NERVE Left 8/5/2021    Procedure: RADIOFREQUENCY THERMAL COAGULATION, NERVE, SPINAL, CERVICAL, POSTERIOR RAMUS, MEDIAL BRANCH, Left C3-4,4-5  only 2 levels resubmitted dt denied all levels on 8-3-2021;  Surgeon: Angie Farah MD;  Location: [facility];  Service: Pain Management;  Laterality: Left;    RADIOFREQUENCY THERMAL COAGULATION OF MEDIAL BRANCH OF POSTERIOR RAMUS OF CERVICAL SPINAL NERVE Right 8/24/2021    Procedure: RADIOFREQUENCY THERMAL COAGULATION, NERVE, SPINAL, CERVICAL, POSTERIOR RAMUS, MEDIAL BRANCH RIGHT C3-C5 RFTC;  Surgeon: Angie Farah MD;  Location: [facility];  Service: Pain Management;  Laterality: Right;  HAD VAC WILL BRING CARD    TRANSFORAMINAL EPIDURAL INJECTION OF STEROID Right 5/30/2018 5/2/2018    Right L4-5 TFESI, Dr Farah     Social History     Socioeconomic History    Marital status:    Tobacco Use    Smoking status: Current Every Day Smoker     Types: Cigarettes    Smokeless tobacco: Never Used   Substance and Sexual Activity    Alcohol use: Not Currently    Drug use: Never     Family History   Problem Relation Age of Onset    Hypertension Mother     Diabetes Mellitus  "Mother      Review of patient's allergies indicates:  No Known Allergies     Objective:  Vitals:    07/26/22 1057 07/26/22 1058   BP: 101/64    Pulse: 87    Weight: 65.3 kg (144 lb)    Height: 5' 3.6" (1.615 m)    PainSc:   7   7         Physical Exam  Vitals and nursing note reviewed. Exam conducted with a chaperone present.   Constitutional:       General: She is awake. She is not in acute distress.     Appearance: Normal appearance. She is not toxic-appearing.   HENT:      Head: Normocephalic and atraumatic.      Nose: Nose normal.      Mouth/Throat:      Mouth: Mucous membranes are moist.      Pharynx: Oropharynx is clear.   Eyes:      Conjunctiva/sclera: Conjunctivae normal.      Pupils: Pupils are equal, round, and reactive to light.   Cardiovascular:      Rate and Rhythm: Normal rate.   Pulmonary:      Effort: Pulmonary effort is normal. No respiratory distress.   Abdominal:      Palpations: Abdomen is soft.      Tenderness: There is no guarding.   Musculoskeletal:         General: Normal range of motion.      Cervical back: Normal range of motion and neck supple. Tenderness present. No rigidity.      Thoracic back: Tenderness present.      Lumbar back: Tenderness present.   Skin:     General: Skin is warm and dry.      Coloration: Skin is not jaundiced or pale.   Neurological:      General: No focal deficit present.      Mental Status: She is alert and oriented to person, place, and time. Mental status is at baseline.      Cranial Nerves: No cranial nerve deficit (II-XII).      Gait: Gait abnormal.   Psychiatric:         Mood and Affect: Mood normal.         Behavior: Behavior normal. Behavior is cooperative.         Thought Content: Thought content normal.           FL Fluoro for Pain Management  See OP Notes for results.     IMPRESSION: See OP Notes for results.     This procedure was auto-finalized by: Virtual Radiologist       Lab Visit on 07/21/2022   Component Date Value Ref Range Status    Sodium " 07/21/2022 137  136 - 145 mmol/L Final    Potassium 07/21/2022 3.9  3.5 - 5.1 mmol/L Final    Chloride 07/21/2022 101  98 - 107 mmol/L Final    CO2 07/21/2022 26  21 - 32 mmol/L Final    Anion Gap 07/21/2022 14  7 - 16 mmol/L Final    Glucose 07/21/2022 105  74 - 106 mg/dL Final    BUN 07/21/2022 20 (A) 7 - 18 mg/dL Final    Creatinine 07/21/2022 1.18 (A) 0.55 - 1.02 mg/dL Final    BUN/Creatinine Ratio 07/21/2022 17  6 - 20 Final    Calcium 07/21/2022 9.7  8.5 - 10.1 mg/dL Final    Total Protein 07/21/2022 7.4  6.4 - 8.2 g/dL Final    Albumin 07/21/2022 4.0  3.5 - 5.0 g/dL Final    Globulin 07/21/2022 3.4  2.0 - 4.0 g/dL Final    A/G Ratio 07/21/2022 1.2   Final    Bilirubin, Total 07/21/2022 0.4  0.0 - 1.2 mg/dL Final    Alk Phos 07/21/2022 100  55 - 142 U/L Final    ALT 07/21/2022 15  13 - 56 U/L Final    AST 07/21/2022 11 (A) 15 - 37 U/L Final    eGFR 07/21/2022 47 (A) >=60 mL/min/1.73m² Final    Triglycerides 07/21/2022 223 (A) 35 - 150 mg/dL Final    Cholesterol 07/21/2022 136  0 - 200 mg/dL Final    HDL Cholesterol 07/21/2022 39 (A) 40 - 60 mg/dL Final    Cholesterol/HDL Ratio (Risk Factor) 07/21/2022 3.5   Final    Non-HDL 07/21/2022 97  mg/dL Final    LDL Calculated 07/21/2022 52  mg/dL Final    LDL/HDL 07/21/2022 1.3   Final    VLDL 07/21/2022 45  mg/dL Final    Hemoglobin A1C 07/21/2022 6.5  4.5 - 6.6 % Final    Estimated Average Glucose 07/21/2022 130  mg/dL Final    TSH 07/21/2022 13.400 (A) 0.358 - 3.740 uIU/mL Final    WBC 07/21/2022 8.56  4.50 - 11.00 K/uL Final    RBC 07/21/2022 5.02  4.20 - 5.40 M/uL Final    Hemoglobin 07/21/2022 15.7  12.0 - 16.0 g/dL Final    Hematocrit 07/21/2022 45.9  38.0 - 47.0 % Final    MCV 07/21/2022 91.4  80.0 - 96.0 fL Final    MCH 07/21/2022 31.3 (A) 27.0 - 31.0 pg Final    MCHC 07/21/2022 34.2  32.0 - 36.0 g/dL Final    RDW 07/21/2022 13.0  11.5 - 14.5 % Final    Platelet Count 07/21/2022 243  150 - 400 K/uL Final    MPV  07/21/2022 11.1  9.4 - 12.4 fL Final    Neutrophils % 07/21/2022 43.2 (A) 53.0 - 65.0 % Final    Lymphocytes % 07/21/2022 45.8 (A) 27.0 - 41.0 % Final    Monocytes % 07/21/2022 6.2 (A) 2.0 - 6.0 % Final    Eosinophils % 07/21/2022 3.6  1.0 - 4.0 % Final    Basophils % 07/21/2022 1.1 (A) 0.0 - 1.0 % Final    Immature Granulocytes % 07/21/2022 0.1  0.0 - 0.4 % Final    nRBC, Auto 07/21/2022 0.0  <=0.0 % Final    Neutrophils, Abs 07/21/2022 3.70  1.80 - 7.70 K/uL Final    Lymphocytes, Absolute 07/21/2022 3.92  1.00 - 4.80 K/uL Final    Monocytes, Absolute 07/21/2022 0.53  0.00 - 0.80 K/uL Final    Eosinophils, Absolute 07/21/2022 0.31  0.00 - 0.50 K/uL Final    Basophils, Absolute 07/21/2022 0.09  0.00 - 0.20 K/uL Final    Immature Granulocytes, Absolute 07/21/2022 0.01  0.00 - 0.04 K/uL Final    nRBC, Absolute 07/21/2022 0.00  <=0.00 x10e3/uL Final    Diff Type 07/21/2022 Auto   Final   Appointment on 04/12/2022   Component Date Value Ref Range Status    SARS Coronavirus 2 Antigen 04/12/2022 Negative  Negative Final     Acceptable 04/12/2022 Yes   Final   Office Visit on 03/24/2022   Component Date Value Ref Range Status    POC Amphetamines 03/24/2022 Negative  Negative, Inconclusive Final    POC Barbiturates 03/24/2022 Negative  Negative, Inconclusive Final    POC Benzodiazepines 03/24/2022 Negative  Negative, Inconclusive Final    POC Cocaine 03/24/2022 Negative  Negative, Inconclusive Final    POC THC 03/24/2022 Negative  Negative, Inconclusive Final    POC Methadone 03/24/2022 Negative  Negative, Inconclusive Final    POC Methamphetamine 03/24/2022 Negative  Negative, Inconclusive Final    POC Opiates 03/24/2022 Presumptive Positive (A) Negative, Inconclusive Final    POC Oxycodone 03/24/2022 Negative  Negative, Inconclusive Final    POC Phencyclidine 03/24/2022 Negative  Negative, Inconclusive Final    POC Methylenedioxymethamphetamine * 03/24/2022 Negative  Negative,  Inconclusive Final    POC Tricyclic Antidepressants 03/24/2022 Negative  Negative, Inconclusive Final    POC Buprenorphine 03/24/2022 Negative   Final     Acceptable 03/24/2022 Yes   Final    POC Temperature (Urine) 03/24/2022 92   Final   Hospital Outpatient Visit on 02/18/2022   Component Date Value Ref Range Status    Case Report 02/18/2022    Final                    Value:Surgical Pathology                                Case: [id number]                                   Authorizing Provider:  Michael Long MD      Collected:           02/18/2022 01:54 PM          Ordering Location:     [facility] - [facility]       Received:            02/18/2022 02:34 PM          Pathologist:           David Harris MD                                                       Specimens:   A) - Stomach, A- Stomach bx                                                                         B) - Esophagus, B- Esophaugus bx                                                           Final Diagnosis 02/18/2022    Final                    Value:This result contains rich text formatting which cannot be displayed here.    Comments 02/18/2022    Final                    Value:This result contains rich text formatting which cannot be displayed here.    Gross Description 02/18/2022    Final                    Value:This result contains rich text formatting which cannot be displayed here.    Microscopic Description 02/18/2022    Final                    Value:This result contains rich text formatting which cannot be displayed here.    Laboratory Notes 02/18/2022    Final                    Value:This result contains rich text formatting which cannot be displayed here.   Appointment on 02/17/2022   Component Date Value Ref Range Status    SARS Coronavirus 2 Antigen 02/17/2022 Negative  Negative Final     Acceptable 02/17/2022 Yes   Final   Office Visit on 02/03/2022   Component Date Value Ref  Range Status    Triglycerides 02/03/2022 296 (A) 35 - 150 mg/dL Final    Cholesterol 02/03/2022 294 (A) 0 - 200 mg/dL Final    HDL Cholesterol 02/03/2022 35 (A) 40 - 60 mg/dL Final    Cholesterol/HDL Ratio (Risk Factor) 02/03/2022 8.4   Final    Non-HDL 02/03/2022 259  mg/dL Final    LDL Calculated 02/03/2022 200  mg/dL Final    LDL/HDL 02/03/2022 5.7   Final    VLDL 02/03/2022 59  mg/dL Final    TSH 02/03/2022 4.950 (A) 0.358 - 3.740 uIU/mL Final    Sodium 02/03/2022 133 (A) 136 - 145 mmol/L Final    Potassium 02/03/2022 3.9  3.5 - 5.1 mmol/L Final    Chloride 02/03/2022 101  98 - 107 mmol/L Final    CO2 02/03/2022 24  21 - 32 mmol/L Final    Anion Gap 02/03/2022 12  7 - 16 mmol/L Final    Glucose 02/03/2022 106  74 - 106 mg/dL Final    BUN 02/03/2022 19 (A) 7 - 18 mg/dL Final    Creatinine 02/03/2022 1.04 (A) 0.55 - 1.02 mg/dL Final    BUN/Creatinine Ratio 02/03/2022 18  6 - 20 Final    Calcium 02/03/2022 9.5  8.5 - 10.1 mg/dL Final    Total Protein 02/03/2022 7.5  6.4 - 8.2 g/dL Final    Albumin 02/03/2022 3.7  3.5 - 5.0 g/dL Final    Globulin 02/03/2022 3.8  2.0 - 4.0 g/dL Final    A/G Ratio 02/03/2022 1.0   Final    Bilirubin, Total 02/03/2022 0.4  0.0 - 1.2 mg/dL Final    Alk Phos 02/03/2022 101  55 - 142 U/L Final    ALT 02/03/2022 15  13 - 56 U/L Final    AST 02/03/2022 10 (A) 15 - 37 U/L Final    eGFR 02/03/2022 54 (A) >=60 mL/min/1.73m² Final    WBC 02/03/2022 8.56  4.50 - 11.00 K/uL Final    RBC 02/03/2022 4.46  4.20 - 5.40 M/uL Final    Hemoglobin 02/03/2022 13.4  12.0 - 16.0 g/dL Final    Hematocrit 02/03/2022 40.0  38.0 - 47.0 % Final    MCV 02/03/2022 89.7  80.0 - 96.0 fL Final    MCH 02/03/2022 30.0  27.0 - 31.0 pg Final    MCHC 02/03/2022 33.5  32.0 - 36.0 g/dL Final    RDW 02/03/2022 13.2  11.5 - 14.5 % Final    Platelet Count 02/03/2022 311  150 - 400 K/uL Final    MPV 02/03/2022 11.3  9.4 - 12.4 fL Final    Neutrophils % 02/03/2022 58.8  53.0 - 65.0 % Final     Lymphocytes % 02/03/2022 29.4  27.0 - 41.0 % Final    Monocytes % 02/03/2022 6.4 (A) 2.0 - 6.0 % Final    Eosinophils % 02/03/2022 3.7  1.0 - 4.0 % Final    Basophils % 02/03/2022 1.3 (A) 0.0 - 1.0 % Final    Immature Granulocytes % 02/03/2022 0.4  0.0 - 0.4 % Final    nRBC, Auto 02/03/2022 0.0  <=0.0 % Final    Neutrophils, Abs 02/03/2022 5.03  1.80 - 7.70 K/uL Final    Lymphocytes, Absolute 02/03/2022 2.52  1.00 - 4.80 K/uL Final    Monocytes, Absolute 02/03/2022 0.55  0.00 - 0.80 K/uL Final    Eosinophils, Absolute 02/03/2022 0.32  0.00 - 0.50 K/uL Final    Basophils, Absolute 02/03/2022 0.11  0.00 - 0.20 K/uL Final    Immature Granulocytes, Absolute 02/03/2022 0.03  0.00 - 0.04 K/uL Final    nRBC, Absolute 02/03/2022 0.00  <=0.00 x10e3/uL Final    Diff Type 02/03/2022 Auto   Final    Hemoglobin A1C 02/03/2022 6.4  4.5 - 6.6 % Final    Estimated Average Glucose 02/03/2022 127  mg/dL Final         Orders Placed This Encounter   Procedures    POCT Urine Drug Screen Presump     Interpretive Information:     Negative:  No drug detected at the cut off level.   Positive:  This result represents presumptive positive for the   tested drug, other substances may yield a positive response other   than the analyte of interest. This result should be utilized for   diagnostic purpose only. Confirmation testing will be performed upon physician request only.          Requested Prescriptions     Pending Prescriptions Disp Refills    gabapentin (NEURONTIN) 100 MG capsule 270 capsule 2     Sig: Take 1 capsule (100 mg total) by mouth every 8 (eight) hours.    HYDROcodone-acetaminophen (NORCO) 5-325 mg per tablet 90 tablet 0     Sig: Take 1 tablet by mouth every 8 (eight) hours.       Assessment:     1. Lumbosacral spondylosis without myelopathy    2. 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 Encounter for long-term (current) use of  other medications         A's of Opioid Risk Assessment  Activity:Patient can perform ADL.   Analgesia:Patients pain is partially controlled by current medication. Patient has tried OTC medications such as Tylenol and Ibuprofen with out relief.   Adverse Effects: Patient denies constipation or sedation.  Aberrant Behavior:  reviewed with no aberrant drug seeking/taking behavior.  Overdose reversal drug naloxone discussed    Drug screen reviewed      Plan:    She had bilateral lumbar L4 through S1 medial branch block # 1, April 2022, she states she had 85% relief after procedure notes ongoing, she states that procedure did help increase her level function    Complaining bilateral leg pain burning numbness and tingling radicular in nature she states her sciatica/radicular type pain is increasing with time she is requesting further options requesting investigational studies    Continue current medication    She denies loss of bowel or bladder function    X-ray lumbar spine, x-ray pelvis and hips    MRI lumbar spine no contrast lumbar radiculopathy  MRI for consideration procedure/surgery    I have given the patient medically directed home exercise program    Patient has tried NSAIDs and neuromodulators (neurontin, lyrica, elavil, or cymbalta)    Follow-up after MRI lumbar spine    Dr. Farah, April 2023      Bring original prescription medication bottles/container/box with labels to each visit

## 2022-08-03 ENCOUNTER — OFFICE VISIT (OUTPATIENT)
Dept: FAMILY MEDICINE | Facility: CLINIC | Age: 82
End: 2022-08-03
Payer: MEDICARE

## 2022-08-03 VITALS
HEART RATE: 88 BPM | WEIGHT: 144 LBS | BODY MASS INDEX: 24.59 KG/M2 | DIASTOLIC BLOOD PRESSURE: 60 MMHG | SYSTOLIC BLOOD PRESSURE: 98 MMHG | TEMPERATURE: 99 F | RESPIRATION RATE: 20 BRPM | OXYGEN SATURATION: 99 % | HEIGHT: 64 IN

## 2022-08-03 DIAGNOSIS — E11.9 CONTROLLED TYPE 2 DIABETES MELLITUS WITHOUT COMPLICATION, WITHOUT LONG-TERM CURRENT USE OF INSULIN: ICD-10-CM

## 2022-08-03 DIAGNOSIS — E11.9 DIABETES MELLITUS WITHOUT COMPLICATION: ICD-10-CM

## 2022-08-03 DIAGNOSIS — E03.9 HYPOTHYROIDISM, UNSPECIFIED TYPE: Primary | ICD-10-CM

## 2022-08-03 DIAGNOSIS — Z78.0 ENCOUNTER FOR OSTEOPOROSIS SCREENING IN ASYMPTOMATIC POSTMENOPAUSAL PATIENT: ICD-10-CM

## 2022-08-03 DIAGNOSIS — Z13.820 ENCOUNTER FOR OSTEOPOROSIS SCREENING IN ASYMPTOMATIC POSTMENOPAUSAL PATIENT: ICD-10-CM

## 2022-08-03 DIAGNOSIS — I10 HYPERTENSION, UNSPECIFIED TYPE: ICD-10-CM

## 2022-08-03 PROCEDURE — 3288F PR FALLS RISK ASSESSMENT DOCUMENTED: ICD-10-PCS | Mod: ,,, | Performed by: NURSE PRACTITIONER

## 2022-08-03 PROCEDURE — 1160F RVW MEDS BY RX/DR IN RCRD: CPT | Mod: ,,, | Performed by: NURSE PRACTITIONER

## 2022-08-03 PROCEDURE — 1160F PR REVIEW ALL MEDS BY PRESCRIBER/CLIN PHARMACIST DOCUMENTED: ICD-10-PCS | Mod: ,,, | Performed by: NURSE PRACTITIONER

## 2022-08-03 PROCEDURE — 3074F PR MOST RECENT SYSTOLIC BLOOD PRESSURE < 130 MM HG: ICD-10-PCS | Mod: ,,, | Performed by: NURSE PRACTITIONER

## 2022-08-03 PROCEDURE — 1159F MED LIST DOCD IN RCRD: CPT | Mod: ,,, | Performed by: NURSE PRACTITIONER

## 2022-08-03 PROCEDURE — 1126F AMNT PAIN NOTED NONE PRSNT: CPT | Mod: ,,, | Performed by: NURSE PRACTITIONER

## 2022-08-03 PROCEDURE — 3078F DIAST BP <80 MM HG: CPT | Mod: ,,, | Performed by: NURSE PRACTITIONER

## 2022-08-03 PROCEDURE — 1159F PR MEDICATION LIST DOCUMENTED IN MEDICAL RECORD: ICD-10-PCS | Mod: ,,, | Performed by: NURSE PRACTITIONER

## 2022-08-03 PROCEDURE — 1101F PR PT FALLS ASSESS DOC 0-1 FALLS W/OUT INJ PAST YR: ICD-10-PCS | Mod: ,,, | Performed by: NURSE PRACTITIONER

## 2022-08-03 PROCEDURE — 3074F SYST BP LT 130 MM HG: CPT | Mod: ,,, | Performed by: NURSE PRACTITIONER

## 2022-08-03 PROCEDURE — 99213 OFFICE O/P EST LOW 20 MIN: CPT | Mod: ,,, | Performed by: NURSE PRACTITIONER

## 2022-08-03 PROCEDURE — 3078F PR MOST RECENT DIASTOLIC BLOOD PRESSURE < 80 MM HG: ICD-10-PCS | Mod: ,,, | Performed by: NURSE PRACTITIONER

## 2022-08-03 PROCEDURE — 1126F PR PAIN SEVERITY QUANTIFIED, NO PAIN PRESENT: ICD-10-PCS | Mod: ,,, | Performed by: NURSE PRACTITIONER

## 2022-08-03 PROCEDURE — 99213 PR OFFICE/OUTPT VISIT, EST, LEVL III, 20-29 MIN: ICD-10-PCS | Mod: ,,, | Performed by: NURSE PRACTITIONER

## 2022-08-03 PROCEDURE — 1101F PT FALLS ASSESS-DOCD LE1/YR: CPT | Mod: ,,, | Performed by: NURSE PRACTITIONER

## 2022-08-03 PROCEDURE — 3288F FALL RISK ASSESSMENT DOCD: CPT | Mod: ,,, | Performed by: NURSE PRACTITIONER

## 2022-08-03 RX ORDER — LANCETS 33 GAUGE
EACH MISCELLANEOUS
Qty: 100 EACH | Refills: 3 | Status: SHIPPED | OUTPATIENT
Start: 2022-08-03 | End: 2022-12-29

## 2022-08-03 RX ORDER — INSULIN DEGLUDEC 100 U/ML
INJECTION, SOLUTION SUBCUTANEOUS
Qty: 4 PEN | Refills: 1 | Status: SHIPPED | OUTPATIENT
Start: 2022-08-03 | End: 2023-01-03 | Stop reason: SDUPTHER

## 2022-08-03 RX ORDER — DEXTROSE 4 G
TABLET,CHEWABLE ORAL
Qty: 1 EACH | Refills: 0 | Status: SHIPPED | OUTPATIENT
Start: 2022-08-03 | End: 2023-06-14 | Stop reason: SDUPTHER

## 2022-08-03 RX ORDER — CARVEDILOL 3.12 MG/1
3.12 TABLET ORAL 2 TIMES DAILY WITH MEALS
Qty: 90 TABLET | Refills: 1 | Status: SHIPPED | OUTPATIENT
Start: 2022-08-03 | End: 2022-11-21 | Stop reason: SDUPTHER

## 2022-08-03 RX ORDER — LANCETS 33 GAUGE
EACH MISCELLANEOUS
COMMUNITY
End: 2022-08-03 | Stop reason: SDUPTHER

## 2022-08-03 NOTE — PROGRESS NOTES
Subjective:       Patient ID: Sharee Elizondo is a 82 y.o. female.    Chief Complaint: Follow-up (Tsh, reports takes daily with missing doses occasionally )    Ms. Elizondo presents in follow up for lab review, recent TSH was elevated, A1C controlled, cholesterol improved. Renal function elevated, but stable overall. She reports taking her levothyroxine daily.    Review of Systems   Constitutional: Negative.    Respiratory: Negative.    Cardiovascular: Negative.    Gastrointestinal: Negative.    Neurological: Negative.    Psychiatric/Behavioral: Negative.          Objective:      Physical Exam  Nursing note reviewed.   Constitutional:       Appearance: Normal appearance.   HENT:      Head: Normocephalic.   Cardiovascular:      Rate and Rhythm: Normal rate and regular rhythm.      Pulses: Normal pulses.           Dorsalis pedis pulses are 2+ on the right side and 2+ on the left side.        Posterior tibial pulses are 2+ on the right side and 2+ on the left side.      Heart sounds: Normal heart sounds.   Pulmonary:      Effort: Pulmonary effort is normal.      Breath sounds: Normal breath sounds.   Musculoskeletal:         General: Normal range of motion.   Feet:      Right foot:      Protective Sensation: 10 sites tested. 10 sites sensed.      Skin integrity: Skin integrity normal.      Toenail Condition: Right toenails are normal.      Left foot:      Protective Sensation: 10 sites tested. 10 sites sensed.      Skin integrity: Skin integrity normal.      Toenail Condition: Left toenails are normal.   Skin:     General: Skin is warm and dry.   Neurological:      Mental Status: She is alert and oriented to person, place, and time.   Psychiatric:         Behavior: Behavior normal.         Assessment:       Problem List Items Addressed This Visit    None     Visit Diagnoses     Hypothyroidism, unspecified type    -  Primary    Relevant Orders    TSH    T3, Free    Encounter for osteoporosis screening in asymptomatic  postmenopausal patient        Relevant Orders    DXA Bone Density Spine And Hip    Controlled type 2 diabetes mellitus without complication, without long-term current use of insulin        Relevant Medications    insulin degludec (TRESIBA FLEXTOUCH U-100) 100 unit/mL (3 mL) insulin pen    blood sugar diagnostic (BLOOD GLUCOSE TEST) Strp    blood-glucose meter Misc    lancets 33 gauge Misc    Other Relevant Orders    Microalbumin/Creatinine Ratio, Urine    Diabetes mellitus without complication        Relevant Medications    insulin degludec (TRESIBA FLEXTOUCH U-100) 100 unit/mL (3 mL) insulin pen    Hypertension, unspecified type        Relevant Medications    carvediloL (COREG) 3.125 MG tablet          Plan:        Increase levothyroxine to 50mcg QD, repeat TSH in 3-4 weeks. We will notify you of results.    Foot exam done today.

## 2022-08-05 RX ORDER — LEVOTHYROXINE SODIUM 50 UG/1
50 TABLET ORAL
Qty: 30 TABLET | Refills: 1 | Status: SHIPPED | OUTPATIENT
Start: 2022-08-05 | End: 2022-10-27 | Stop reason: ALTCHOICE

## 2022-08-16 NOTE — PROGRESS NOTES
Subjective:         Patient ID: Sharee Elizondo is a 82 y.o. female.    Chief Complaint: Back Pain and Neck Pain      Low-back Pain  This is a chronic problem. The current episode started more than 1 year ago. The problem occurs daily. The problem has been waxing and waning since onset. Associated symptoms include leg pain. Pertinent negatives include no bladder incontinence, chest pain, dysuria or fever.   Pain  This is a chronic problem. The current episode started more than 1 year ago. The problem occurs daily. The problem has been waxing and waning. Associated symptoms include arthralgias. Pertinent negatives include no change in bowel habit, chest pain, chills, coughing, diaphoresis, fever, neck pain, rash, sore throat, vertigo or vomiting.     Review of Systems   Constitutional: Negative for activity change, appetite change, chills, diaphoresis and fever.   HENT: Negative for drooling, ear pain, facial swelling, mouth dryness, nosebleeds, sore throat, trouble swallowing, voice change and goiter.    Eyes: Negative for photophobia, pain, discharge, redness and visual disturbance.   Respiratory: Negative for apnea, cough, choking, chest tightness, shortness of breath, wheezing and stridor.    Cardiovascular: Negative for chest pain, palpitations and leg swelling.   Gastrointestinal: Negative for abdominal distention, change in bowel habit, diarrhea, rectal pain, vomiting, fecal incontinence and change in bowel habit.   Endocrine: Negative for cold intolerance, heat intolerance, polydipsia, polyphagia and polyuria.   Genitourinary: Negative for bladder incontinence, dysuria, flank pain, frequency and hot flashes.   Musculoskeletal: Positive for arthralgias, back pain, gait problem and leg pain. Negative for neck pain and neck stiffness.   Integumentary:  Negative for color change, pallor and rash.   Allergic/Immunologic: Negative for immunocompromised state.   Neurological: Negative for dizziness, vertigo, seizures,  syncope, facial asymmetry, speech difficulty, light-headedness, disturbances in coordination, memory loss and coordination difficulties.   Hematological: Negative for adenopathy. Does not bruise/bleed easily.   Psychiatric/Behavioral: Negative for agitation, behavioral problems, confusion, decreased concentration, dysphoric mood, hallucinations, self-injury and suicidal ideas. The patient is not nervous/anxious and is not hyperactive.            Past Medical History:   Diagnosis Date    Acute superficial gastritis without hemorrhage 2/18/2022    Atrial fibrillation     Automatic implantable cardiac defibrillator in situ     Bilateral primary osteoarthritis of knee     Cervical spondylosis without myelopathy     CHF (congestive heart failure)     Chronic ischemic heart disease     Chronic kidney disease (CKD), stage III (moderate)     Chronic pain syndrome     COPD (chronic obstructive pulmonary disease)     Coronary arteriosclerosis     Depressive disorder     Esophageal dysphagia 2/18/2022    GERD (gastroesophageal reflux disease)     HH (hiatus hernia) 2/18/2022    Hypothyroidism     Low back pain     Lumbar radiculopathy     Melanoma 2019    scalp    Metabolic syndrome     Myocardial ischemia     Other long term (current) drug therapy     Radiculopathy, cervical region     Type 2 diabetes mellitus      Past Surgical History:   Procedure Laterality Date    BACK SURGERY      bilateral knee injection/ aspiration of joint/ bursa-large  Bilateral 4/4/2017 3/27/2017 3/20/2017    bilateral knee orthovisc injection    CARDIAC SURGERY      EPIDURAL STEROID INJECTION INTO CERVICAL SPINE  6/19/2019 5/17/2017 4/26/2017    C6-7 Dr Gagan FLEMING    EPIDURAL STEROID INJECTION INTO LUMBAR SPINE  03/15/2018    L4-5 Dr Gagan FLEMING    HYSTERECTOMY      INJECTION OF ANESTHETIC AGENT AROUND MEDIAL BRANCH NERVES INNERVATING LUMBAR FACET JOINT Bilateral 4/14/2022    Procedure: Block-nerve-medial  branch-lumbar, bilateral L4 through S1;  Surgeon: Angie Farah MD;  [facility];  Service: Pain Management;  Laterality: Bilateral;  pt aware at visit to be tested  4/12 patient getting covid test today    INJECTION OF FACET JOINT Bilateral 8/17/2017 7/19/2017    Bilateral C3-7 FI, Dr Farah    INJECTION OF FACET JOINT Left 10/18/2018    left C3-7 FI, Dr Farah    INJECTION OF FACET JOINT Bilateral 8/22/2018 6/29/2018    bilateral L3-S1 FI, Dr Farah    RADIOFREQUENCY ABLATION Right 12/12/2018    Right C3-7 RF, Dr Farah    RADIOFREQUENCY ABLATION Left 01/9/2019 10/16/2017 10/7/2020    Left C3-7 RF, Dr Farah    RADIOFREQUENCY ABLATION Left 1/9/2019 10/16/2017    left C3-7 RF, Dr Farah    RADIOFREQUENCY THERMAL COAGULATION OF MEDIAL BRANCH OF POSTERIOR RAMUS OF CERVICAL SPINAL NERVE Left 8/5/2021    Procedure: RADIOFREQUENCY THERMAL COAGULATION, NERVE, SPINAL, CERVICAL, POSTERIOR RAMUS, MEDIAL BRANCH, Left C3-4,4-5  only 2 levels resubmitted dt denied all levels on 8-3-2021;  Surgeon: Angie Farah MD;  [facility];  Service: Pain Management;  Laterality: Left;    RADIOFREQUENCY THERMAL COAGULATION OF MEDIAL BRANCH OF POSTERIOR RAMUS OF CERVICAL SPINAL NERVE Right 8/24/2021    Procedure: RADIOFREQUENCY THERMAL COAGULATION, NERVE, SPINAL, CERVICAL, POSTERIOR RAMUS, MEDIAL BRANCH RIGHT C3-C5 RFTC;  Surgeon: Angie Farah MD;  [facility];  Service: Pain Management;  Laterality: Right;  HAD VAC WILL BRING CARD    TRANSFORAMINAL EPIDURAL INJECTION OF STEROID Right 5/30/2018 5/2/2018    Right L4-5 TFESI, Dr Farah     Social History     Socioeconomic History    Marital status:    Tobacco Use    Smoking status: Current Every Day Smoker     Packs/day: 0.50     Years: 40.00     Pack years: 20.00     Types: Cigarettes    Smokeless tobacco: Never Used   Substance and Sexual Activity    Alcohol use: Not Currently    Drug use: Never     Family  "History   Problem Relation Age of Onset    Hypertension Mother     Diabetes Mellitus Mother      Review of patient's allergies indicates:  No Known Allergies     Objective:  Vitals:    08/17/22 0959 08/17/22 1000   BP: (!) 89/47    Pulse: 85    Resp: 18    SpO2: 98%    Weight: 65.3 kg (144 lb)    Height: 5' 3" (1.6 m)    PainSc:   7   7         Physical Exam  Vitals and nursing note reviewed. Exam conducted with a chaperone present.   Constitutional:       General: She is awake. She is not in acute distress.     Appearance: Normal appearance. She is not toxic-appearing.   HENT:      Head: Normocephalic and atraumatic.      Nose: Nose normal.      Mouth/Throat:      Mouth: Mucous membranes are moist.      Pharynx: Oropharynx is clear.   Eyes:      Conjunctiva/sclera: Conjunctivae normal.      Pupils: Pupils are equal, round, and reactive to light.   Cardiovascular:      Rate and Rhythm: Normal rate.   Pulmonary:      Effort: Pulmonary effort is normal. No respiratory distress.   Abdominal:      Palpations: Abdomen is soft.      Tenderness: There is no guarding.   Musculoskeletal:         General: Normal range of motion.      Cervical back: Normal range of motion and neck supple. Tenderness present. No rigidity.      Thoracic back: Tenderness present.      Lumbar back: Tenderness present.   Skin:     General: Skin is warm and dry.      Coloration: Skin is not jaundiced or pale.   Neurological:      General: No focal deficit present.      Mental Status: She is alert and oriented to person, place, and time. Mental status is at baseline.      Cranial Nerves: No cranial nerve deficit (II-XII).      Gait: Gait abnormal.   Psychiatric:         Mood and Affect: Mood normal.         Behavior: Behavior normal. Behavior is cooperative.         Thought Content: Thought content normal.           X-Ray Hips Bilateral 2 View Inc AP Pelvis  Narrative: EXAMINATION:  XR HIPS BILATERAL 2 VIEW INCL AP PELVIS    CLINICAL " HISTORY:  Radiculopathy, lumbar region    COMPARISON:  None available    FINDINGS:  No evidence of fracture seen.  The alignment of the joints appears normal.  Mild bilateral hip degenerative change is present.  No soft tissue abnormality is seen.  Impression: Mild hip osteoarthrosis.    Electronically signed by: Chico Gonzalez  Date:    08/17/2022  Time:    08:49  X-Ray Lumbar Spine AP And Lateral  Narrative: EXAMINATION:  XR LUMBAR SPINE AP AND LATERAL    CLINICAL HISTORY:  Low back pain, symptoms persist with > 6wks conservative treatment;Dorsalgia, unspecified    TECHNIQUE:  AP and lateral images were performed of the lumbar spine.    COMPARISON:  None    FINDINGS:  There is a mild leftward curvature of the lumbar spine.  Sagittal alignment shows mild degree of anterolisthesis L4 on L5.  There are mild-to-moderate degenerative endplate and facet changes throughout the lumbar spine most notably L4-5 and L5-S1.  Vertebral body heights are maintained.  Vascular calcifications.  Impression: Multilevel degenerative changes.    Electronically signed by: Bob Dewey  Date:    08/17/2022  Time:    08:41       Office Visit on 07/26/2022   Component Date Value Ref Range Status    POC Amphetamines 07/26/2022 Negative  Negative, Inconclusive Final    POC Barbiturates 07/26/2022 Negative  Negative, Inconclusive Final    POC Benzodiazepines 07/26/2022 Negative  Negative, Inconclusive Final    POC Cocaine 07/26/2022 Negative  Negative, Inconclusive Final    POC THC 07/26/2022 Negative  Negative, Inconclusive Final    POC Methadone 07/26/2022 Negative  Negative, Inconclusive Final    POC Methamphetamine 07/26/2022 Negative  Negative, Inconclusive Final    POC Opiates 07/26/2022 Presumptive Positive (A) Negative, Inconclusive Final    POC Oxycodone 07/26/2022 Negative  Negative, Inconclusive Final    POC Phencyclidine 07/26/2022 Negative  Negative, Inconclusive Final    POC Methylenedioxymethamphetamine * 07/26/2022  Negative  Negative, Inconclusive Final    POC Tricyclic Antidepressants 07/26/2022 Negative  Negative, Inconclusive Final    POC Buprenorphine 07/26/2022 Negative   Final     Acceptable 07/26/2022 Yes   Final    POC Temperature (Urine) 07/26/2022 94   Final   Lab Visit on 07/21/2022   Component Date Value Ref Range Status    Sodium 07/21/2022 137  136 - 145 mmol/L Final    Potassium 07/21/2022 3.9  3.5 - 5.1 mmol/L Final    Chloride 07/21/2022 101  98 - 107 mmol/L Final    CO2 07/21/2022 26  21 - 32 mmol/L Final    Anion Gap 07/21/2022 14  7 - 16 mmol/L Final    Glucose 07/21/2022 105  74 - 106 mg/dL Final    BUN 07/21/2022 20 (A) 7 - 18 mg/dL Final    Creatinine 07/21/2022 1.18 (A) 0.55 - 1.02 mg/dL Final    BUN/Creatinine Ratio 07/21/2022 17  6 - 20 Final    Calcium 07/21/2022 9.7  8.5 - 10.1 mg/dL Final    Total Protein 07/21/2022 7.4  6.4 - 8.2 g/dL Final    Albumin 07/21/2022 4.0  3.5 - 5.0 g/dL Final    Globulin 07/21/2022 3.4  2.0 - 4.0 g/dL Final    A/G Ratio 07/21/2022 1.2   Final    Bilirubin, Total 07/21/2022 0.4  0.0 - 1.2 mg/dL Final    Alk Phos 07/21/2022 100  55 - 142 U/L Final    ALT 07/21/2022 15  13 - 56 U/L Final    AST 07/21/2022 11 (A) 15 - 37 U/L Final    eGFR 07/21/2022 47 (A) >=60 mL/min/1.73m² Final    Triglycerides 07/21/2022 223 (A) 35 - 150 mg/dL Final    Cholesterol 07/21/2022 136  0 - 200 mg/dL Final    HDL Cholesterol 07/21/2022 39 (A) 40 - 60 mg/dL Final    Cholesterol/HDL Ratio (Risk Factor) 07/21/2022 3.5   Final    Non-HDL 07/21/2022 97  mg/dL Final    LDL Calculated 07/21/2022 52  mg/dL Final    LDL/HDL 07/21/2022 1.3   Final    VLDL 07/21/2022 45  mg/dL Final    Hemoglobin A1C 07/21/2022 6.5  4.5 - 6.6 % Final    Estimated Average Glucose 07/21/2022 130  mg/dL Final    TSH 07/21/2022 13.400 (A) 0.358 - 3.740 uIU/mL Final    WBC 07/21/2022 8.56  4.50 - 11.00 K/uL Final    RBC 07/21/2022 5.02  4.20 - 5.40 M/uL Final    Hemoglobin  07/21/2022 15.7  12.0 - 16.0 g/dL Final    Hematocrit 07/21/2022 45.9  38.0 - 47.0 % Final    MCV 07/21/2022 91.4  80.0 - 96.0 fL Final    MCH 07/21/2022 31.3 (A) 27.0 - 31.0 pg Final    MCHC 07/21/2022 34.2  32.0 - 36.0 g/dL Final    RDW 07/21/2022 13.0  11.5 - 14.5 % Final    Platelet Count 07/21/2022 243  150 - 400 K/uL Final    MPV 07/21/2022 11.1  9.4 - 12.4 fL Final    Neutrophils % 07/21/2022 43.2 (A) 53.0 - 65.0 % Final    Lymphocytes % 07/21/2022 45.8 (A) 27.0 - 41.0 % Final    Monocytes % 07/21/2022 6.2 (A) 2.0 - 6.0 % Final    Eosinophils % 07/21/2022 3.6  1.0 - 4.0 % Final    Basophils % 07/21/2022 1.1 (A) 0.0 - 1.0 % Final    Immature Granulocytes % 07/21/2022 0.1  0.0 - 0.4 % Final    nRBC, Auto 07/21/2022 0.0  <=0.0 % Final    Neutrophils, Abs 07/21/2022 3.70  1.80 - 7.70 K/uL Final    Lymphocytes, Absolute 07/21/2022 3.92  1.00 - 4.80 K/uL Final    Monocytes, Absolute 07/21/2022 0.53  0.00 - 0.80 K/uL Final    Eosinophils, Absolute 07/21/2022 0.31  0.00 - 0.50 K/uL Final    Basophils, Absolute 07/21/2022 0.09  0.00 - 0.20 K/uL Final    Immature Granulocytes, Absolute 07/21/2022 0.01  0.00 - 0.04 K/uL Final    nRBC, Absolute 07/21/2022 0.00  <=0.00 x10e3/uL Final    Diff Type 07/21/2022 Auto   Final   Appointment on 04/12/2022   Component Date Value Ref Range Status    SARS Coronavirus 2 Antigen 04/12/2022 Negative  Negative Final     Acceptable 04/12/2022 Yes   Final   Office Visit on 03/24/2022   Component Date Value Ref Range Status    POC Amphetamines 03/24/2022 Negative  Negative, Inconclusive Final    POC Barbiturates 03/24/2022 Negative  Negative, Inconclusive Final    POC Benzodiazepines 03/24/2022 Negative  Negative, Inconclusive Final    POC Cocaine 03/24/2022 Negative  Negative, Inconclusive Final    POC THC 03/24/2022 Negative  Negative, Inconclusive Final    POC Methadone 03/24/2022 Negative  Negative, Inconclusive Final    POC Methamphetamine  03/24/2022 Negative  Negative, Inconclusive Final    POC Opiates 03/24/2022 Presumptive Positive (A) Negative, Inconclusive Final    POC Oxycodone 03/24/2022 Negative  Negative, Inconclusive Final    POC Phencyclidine 03/24/2022 Negative  Negative, Inconclusive Final    POC Methylenedioxymethamphetamine * 03/24/2022 Negative  Negative, Inconclusive Final    POC Tricyclic Antidepressants 03/24/2022 Negative  Negative, Inconclusive Final    POC Buprenorphine 03/24/2022 Negative   Final     Acceptable 03/24/2022 Yes   Final    POC Temperature (Urine) 03/24/2022 92   Final   Hospital Outpatient Visit on 02/18/2022   Component Date Value Ref Range Status    Case Report 02/18/2022    Final                    Value:Surgical Pathology                                Case: [id number]                                   Authorizing Provider:  Michael Long MD      Collected:           02/18/2022 01:54 PM          Ordering Location:     [facility] - Endoscopy       Received:            02/18/2022 02:34 PM          Pathologist:           David Harris MD                                                       Specimens:   A) - Stomach, A- Stomach bx                                                                         B) - Esophagus, B- Esophaugus bx                                                           Final Diagnosis 02/18/2022    Final                    Value:This result contains rich text formatting which cannot be displayed here.    Comments 02/18/2022    Final                    Value:This result contains rich text formatting which cannot be displayed here.    Gross Description 02/18/2022    Final                    Value:This result contains rich text formatting which cannot be displayed here.    Microscopic Description 02/18/2022    Final                    Value:This result contains rich text formatting which cannot be displayed here.    Laboratory Notes 02/18/2022    Final                     Value:This result contains rich text formatting which cannot be displayed here.         Orders Placed This Encounter   Procedures    POCT Urine Drug Screen Presump     Interpretive Information:     Negative:  No drug detected at the cut off level.   Positive:  This result represents presumptive positive for the   tested drug, other substances may yield a positive response other   than the analyte of interest. This result should be utilized for   diagnostic purpose only. Confirmation testing will be performed upon physician request only.          Requested Prescriptions     Pending Prescriptions Disp Refills    HYDROcodone-acetaminophen (NORCO) 5-325 mg per tablet 90 tablet 0     Sig: Take 1 tablet by mouth every 8 (eight) hours.       Assessment:     1. Lumbosacral spondylosis without myelopathy    2. 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 Encounter for long-term (current) use of other medications         A's of Opioid Risk Assessment  Activity:Patient can perform ADL.   Analgesia:Patients pain is partially controlled by current medication. Patient has tried OTC medications such as Tylenol and Ibuprofen with out relief.   Adverse Effects: Patient denies constipation or sedation.  Aberrant Behavior:  reviewed with no aberrant drug seeking/taking behavior.  Overdose reversal drug naloxone discussed    Drug screen reviewed      Plan:    Cardiac defibrillator cannot have MRI    She had bilateral lumbar L4 through S1 medial branch block # 1, April 2022, she states she had 85% relief after procedure notes ongoing, she states that procedure did help increase her level function    Complaining bilateral leg pain burning numbness and tingling radicular in nature she states her sciatica/radicular type pain is increasing with time she is requesting further options requesting investigational studies    Could not do MRI due to  cardiac defibrillator    Continue current medication    She denies loss of bowel or bladder function    X-ray lumbar spine August 17, 2022 multiple level degenerative changes    X-ray pelvis and hips August 17, 2022 no fracture noted degenerative changes    Lumbar myelogram with post contrast CT for consideration procedure/surgery    I have given the patient medically directed home exercise program    Patient has tried NSAIDs and neuromodulators (neurontin, lyrica, elavil, or cymbalta)    Follow-up after lumbar myelogram with post contrast CT    Dr. Farah, April 2023    Pill count    Physical therapy    Bring original prescription medication bottles/container/box with labels to each visit

## 2022-08-17 ENCOUNTER — OFFICE VISIT (OUTPATIENT)
Dept: PAIN MEDICINE | Facility: CLINIC | Age: 82
End: 2022-08-17
Payer: MEDICARE

## 2022-08-17 ENCOUNTER — HOSPITAL ENCOUNTER (OUTPATIENT)
Dept: RADIOLOGY | Facility: HOSPITAL | Age: 82
Discharge: HOME | End: 2022-08-17
Attending: PHYSICIAN ASSISTANT
Payer: MEDICARE

## 2022-08-17 VITALS
HEIGHT: 63 IN | OXYGEN SATURATION: 98 % | RESPIRATION RATE: 18 BRPM | BODY MASS INDEX: 25.52 KG/M2 | HEART RATE: 85 BPM | WEIGHT: 144 LBS | SYSTOLIC BLOOD PRESSURE: 89 MMHG | DIASTOLIC BLOOD PRESSURE: 47 MMHG

## 2022-08-17 DIAGNOSIS — M54.9 DORSALGIA, UNSPECIFIED: ICD-10-CM

## 2022-08-17 DIAGNOSIS — Z79.899 ENCOUNTER FOR LONG-TERM (CURRENT) USE OF OTHER MEDICATIONS: ICD-10-CM

## 2022-08-17 DIAGNOSIS — M89.49 OSTEOARTHROSIS MULTIPLE SITES, NOT SPECIFIED AS GENERALIZED: Chronic | ICD-10-CM

## 2022-08-17 DIAGNOSIS — M54.16 LUMBAR RADICULOPATHY, CHRONIC: ICD-10-CM

## 2022-08-17 DIAGNOSIS — M54.16 LUMBAR RADICULOPATHY: Primary | Chronic | ICD-10-CM

## 2022-08-17 DIAGNOSIS — M47.812 SPONDYLOSIS OF CERVICAL JOINT WITHOUT MYELOPATHY: Chronic | ICD-10-CM

## 2022-08-17 PROCEDURE — 72100 X-RAY EXAM L-S SPINE 2/3 VWS: CPT | Mod: 26,,, | Performed by: RADIOLOGY

## 2022-08-17 PROCEDURE — 99214 OFFICE O/P EST MOD 30 MIN: CPT | Mod: S$PBB,,, | Performed by: PHYSICIAN ASSISTANT

## 2022-08-17 PROCEDURE — 3078F PR MOST RECENT DIASTOLIC BLOOD PRESSURE < 80 MM HG: ICD-10-PCS | Mod: CPTII,,, | Performed by: PHYSICIAN ASSISTANT

## 2022-08-17 PROCEDURE — 3078F DIAST BP <80 MM HG: CPT | Mod: CPTII,,, | Performed by: PHYSICIAN ASSISTANT

## 2022-08-17 PROCEDURE — 1125F AMNT PAIN NOTED PAIN PRSNT: CPT | Mod: CPTII,,, | Performed by: PHYSICIAN ASSISTANT

## 2022-08-17 PROCEDURE — 1101F PR PT FALLS ASSESS DOC 0-1 FALLS W/OUT INJ PAST YR: ICD-10-PCS | Mod: CPTII,,, | Performed by: PHYSICIAN ASSISTANT

## 2022-08-17 PROCEDURE — 73521 X-RAY EXAM HIPS BI 2 VIEWS: CPT | Mod: TC

## 2022-08-17 PROCEDURE — 72100 XR LUMBAR SPINE AP AND LATERAL: ICD-10-PCS | Mod: 26,,, | Performed by: RADIOLOGY

## 2022-08-17 PROCEDURE — 1159F MED LIST DOCD IN RCRD: CPT | Mod: CPTII,,, | Performed by: PHYSICIAN ASSISTANT

## 2022-08-17 PROCEDURE — 1125F PR PAIN SEVERITY QUANTIFIED, PAIN PRESENT: ICD-10-PCS | Mod: CPTII,,, | Performed by: PHYSICIAN ASSISTANT

## 2022-08-17 PROCEDURE — 3288F PR FALLS RISK ASSESSMENT DOCUMENTED: ICD-10-PCS | Mod: CPTII,,, | Performed by: PHYSICIAN ASSISTANT

## 2022-08-17 PROCEDURE — 72100 X-RAY EXAM L-S SPINE 2/3 VWS: CPT | Mod: TC

## 2022-08-17 PROCEDURE — 73521 X-RAY EXAM HIPS BI 2 VIEWS: CPT | Mod: 26,,, | Performed by: RADIOLOGY

## 2022-08-17 PROCEDURE — 1159F PR MEDICATION LIST DOCUMENTED IN MEDICAL RECORD: ICD-10-PCS | Mod: CPTII,,, | Performed by: PHYSICIAN ASSISTANT

## 2022-08-17 PROCEDURE — 73521 XR HIPS BILATERAL 2 VIEW INCL AP PELVIS: ICD-10-PCS | Mod: 26,,, | Performed by: RADIOLOGY

## 2022-08-17 PROCEDURE — 3074F SYST BP LT 130 MM HG: CPT | Mod: CPTII,,, | Performed by: PHYSICIAN ASSISTANT

## 2022-08-17 PROCEDURE — 99214 PR OFFICE/OUTPT VISIT, EST, LEVL IV, 30-39 MIN: ICD-10-PCS | Mod: S$PBB,,, | Performed by: PHYSICIAN ASSISTANT

## 2022-08-17 PROCEDURE — 99215 OFFICE O/P EST HI 40 MIN: CPT | Mod: PBBFAC | Performed by: PHYSICIAN ASSISTANT

## 2022-08-17 PROCEDURE — 80305 DRUG TEST PRSMV DIR OPT OBS: CPT | Mod: PBBFAC | Performed by: PHYSICIAN ASSISTANT

## 2022-08-17 PROCEDURE — 1101F PT FALLS ASSESS-DOCD LE1/YR: CPT | Mod: CPTII,,, | Performed by: PHYSICIAN ASSISTANT

## 2022-08-17 PROCEDURE — 3074F PR MOST RECENT SYSTOLIC BLOOD PRESSURE < 130 MM HG: ICD-10-PCS | Mod: CPTII,,, | Performed by: PHYSICIAN ASSISTANT

## 2022-08-17 PROCEDURE — 3288F FALL RISK ASSESSMENT DOCD: CPT | Mod: CPTII,,, | Performed by: PHYSICIAN ASSISTANT

## 2022-08-17 RX ORDER — HYDROCODONE BITARTRATE AND ACETAMINOPHEN 5; 325 MG/1; MG/1
1 TABLET ORAL EVERY 8 HOURS
Qty: 90 TABLET | Refills: 0 | Status: SHIPPED | OUTPATIENT
Start: 2022-08-26 | End: 2022-09-20 | Stop reason: SDUPTHER

## 2022-08-24 ENCOUNTER — TELEPHONE (OUTPATIENT)
Dept: FAMILY MEDICINE | Facility: CLINIC | Age: 82
End: 2022-08-24
Payer: MEDICARE

## 2022-08-24 NOTE — TELEPHONE ENCOUNTER
----- Message from JOSELITO Mora sent at 8/24/2022  3:55 PM CDT -----  Please notify pt. That TSH is normalizing, continue current dose.

## 2022-08-29 ENCOUNTER — HOSPITAL ENCOUNTER (OUTPATIENT)
Dept: RADIOLOGY | Facility: HOSPITAL | Age: 82
Discharge: HOME | End: 2022-08-29
Attending: NURSE PRACTITIONER
Payer: MEDICARE

## 2022-08-29 DIAGNOSIS — Z13.820 ENCOUNTER FOR OSTEOPOROSIS SCREENING IN ASYMPTOMATIC POSTMENOPAUSAL PATIENT: ICD-10-CM

## 2022-08-29 DIAGNOSIS — Z78.0 ENCOUNTER FOR OSTEOPOROSIS SCREENING IN ASYMPTOMATIC POSTMENOPAUSAL PATIENT: ICD-10-CM

## 2022-08-29 PROCEDURE — 77080 DXA BONE DENSITY AXIAL: CPT | Mod: 26,,, | Performed by: RADIOLOGY

## 2022-08-29 PROCEDURE — 77080 DEXA BONE DENSITY SPINE HIP: ICD-10-PCS | Mod: 26,,, | Performed by: RADIOLOGY

## 2022-08-29 PROCEDURE — 77080 DXA BONE DENSITY AXIAL: CPT | Mod: TC

## 2022-08-30 ENCOUNTER — HOSPITAL ENCOUNTER (OUTPATIENT)
Dept: RADIOLOGY | Facility: HOSPITAL | Age: 82
Discharge: HOME | End: 2022-08-30
Attending: PHYSICIAN ASSISTANT
Payer: MEDICARE

## 2022-08-30 VITALS
RESPIRATION RATE: 18 BRPM | HEIGHT: 66 IN | HEART RATE: 70 BPM | OXYGEN SATURATION: 100 % | BODY MASS INDEX: 23.24 KG/M2 | SYSTOLIC BLOOD PRESSURE: 123 MMHG | DIASTOLIC BLOOD PRESSURE: 61 MMHG | TEMPERATURE: 99 F

## 2022-08-30 DIAGNOSIS — M54.16 LUMBAR RADICULOPATHY: ICD-10-CM

## 2022-08-30 DIAGNOSIS — M54.9 DORSALGIA, UNSPECIFIED: ICD-10-CM

## 2022-08-30 LAB
APTT PPP: 28.8 SECONDS (ref 25.2–37.3)
BASOPHILS # BLD AUTO: 0.07 K/UL (ref 0–0.2)
BASOPHILS NFR BLD AUTO: 0.7 % (ref 0–1)
DIFFERENTIAL METHOD BLD: ABNORMAL
EOSINOPHIL # BLD AUTO: 0.36 K/UL (ref 0–0.5)
EOSINOPHIL NFR BLD AUTO: 3.7 % (ref 1–4)
ERYTHROCYTE [DISTWIDTH] IN BLOOD BY AUTOMATED COUNT: 12.3 % (ref 11.5–14.5)
HCT VFR BLD AUTO: 36.2 % (ref 38–47)
HGB BLD-MCNC: 11.9 G/DL (ref 12–16)
IMM GRANULOCYTES # BLD AUTO: 0.03 K/UL (ref 0–0.04)
IMM GRANULOCYTES NFR BLD: 0.3 % (ref 0–0.4)
INR BLD: 1
LYMPHOCYTES # BLD AUTO: 2.91 K/UL (ref 1–4.8)
LYMPHOCYTES NFR BLD AUTO: 29.7 % (ref 27–41)
MCH RBC QN AUTO: 30.7 PG (ref 27–31)
MCHC RBC AUTO-ENTMCNC: 32.9 G/DL (ref 32–36)
MCV RBC AUTO: 93.5 FL (ref 80–96)
MONOCYTES # BLD AUTO: 0.62 K/UL (ref 0–0.8)
MONOCYTES NFR BLD AUTO: 6.3 % (ref 2–6)
MPC BLD CALC-MCNC: 10.5 FL (ref 9.4–12.4)
NEUTROPHILS # BLD AUTO: 5.8 K/UL (ref 1.8–7.7)
NEUTROPHILS NFR BLD AUTO: 59.3 % (ref 53–65)
NRBC # BLD AUTO: 0 X10E3/UL
NRBC, AUTO (.00): 0 %
PLATELET # BLD AUTO: 219 K/UL (ref 150–400)
PROTHROMBIN TIME: 12.8 SECONDS (ref 11.7–14.7)
RBC # BLD AUTO: 3.87 M/UL (ref 4.2–5.4)
WBC # BLD AUTO: 9.79 K/UL (ref 4.5–11)

## 2022-08-30 PROCEDURE — 72132 CT LUMBAR SPINE W/DYE: CPT | Mod: TC

## 2022-08-30 PROCEDURE — 25500020 PHARM REV CODE 255: Performed by: PHYSICIAN ASSISTANT

## 2022-08-30 PROCEDURE — 62304 MYELOGRAPHY LUMBAR INJECTION: CPT

## 2022-08-30 PROCEDURE — 85025 COMPLETE CBC W/AUTO DIFF WBC: CPT | Performed by: RADIOLOGY

## 2022-08-30 PROCEDURE — 72132 CT LUMBAR SPINE WITH CONTRAST: ICD-10-PCS | Mod: 26,,, | Performed by: RADIOLOGY

## 2022-08-30 PROCEDURE — 85730 THROMBOPLASTIN TIME PARTIAL: CPT | Performed by: RADIOLOGY

## 2022-08-30 PROCEDURE — 36415 COLL VENOUS BLD VENIPUNCTURE: CPT | Performed by: RADIOLOGY

## 2022-08-30 PROCEDURE — 72132 CT LUMBAR SPINE W/DYE: CPT | Mod: 26,,, | Performed by: RADIOLOGY

## 2022-08-30 RX ORDER — SODIUM CHLORIDE 450 MG/100ML
INJECTION, SOLUTION INTRAVENOUS ONCE
Status: DISCONTINUED | OUTPATIENT
Start: 2022-08-30 | End: 2022-08-31 | Stop reason: HOSPADM

## 2022-08-30 RX ORDER — IOPAMIDOL 408 MG/ML
15 INJECTION, SOLUTION INTRATHECAL
Status: COMPLETED | OUTPATIENT
Start: 2022-08-30 | End: 2022-08-30

## 2022-08-30 RX ADMIN — IOPAMIDOL 15 ML: 408 INJECTION, SOLUTION INTRATHECAL at 10:08

## 2022-08-30 NOTE — PROGRESS NOTES
Lumbar myelogram  Performed by A Milling RRA  Procedure was explained to the patient including risks and possible complications.  Patient agreed to procedure consent is signed.  A formal timeout was called all staff present agreed to patient and procedure.  The lower back was prepped with Betadine and sterile field was established.  1% lidocaine was used as local anesthetic.  Under fluoroscopic guidance a 22 gauge spinal needle was placed at the L2-L3 interspace.  15 cc of Isovue 200 M was injected into the intrathecal sac.  The needle was removed and the puncture site was cleaned and bandaged.  The patient tolerated the procedure well there were no immediate postprocedure complications.  Total fluoroscopy time was 1 minutes 46 seconds.  The patient will be transferred to CT for post-injection scanning.

## 2022-09-01 ENCOUNTER — OFFICE VISIT (OUTPATIENT)
Dept: FAMILY MEDICINE | Facility: CLINIC | Age: 82
End: 2022-09-01
Payer: MEDICARE

## 2022-09-01 VITALS
TEMPERATURE: 98 F | HEART RATE: 68 BPM | SYSTOLIC BLOOD PRESSURE: 110 MMHG | OXYGEN SATURATION: 98 % | DIASTOLIC BLOOD PRESSURE: 68 MMHG | HEIGHT: 66 IN | RESPIRATION RATE: 18 BRPM | BODY MASS INDEX: 23.14 KG/M2 | WEIGHT: 144 LBS

## 2022-09-01 DIAGNOSIS — M81.0 AGE-RELATED OSTEOPOROSIS WITHOUT CURRENT PATHOLOGICAL FRACTURE: Primary | ICD-10-CM

## 2022-09-01 DIAGNOSIS — E03.9 HYPOTHYROIDISM, UNSPECIFIED TYPE: ICD-10-CM

## 2022-09-01 PROCEDURE — 99212 OFFICE O/P EST SF 10 MIN: CPT | Mod: ,,, | Performed by: NURSE PRACTITIONER

## 2022-09-01 PROCEDURE — 99212 PR OFFICE/OUTPT VISIT, EST, LEVL II, 10-19 MIN: ICD-10-PCS | Mod: ,,, | Performed by: NURSE PRACTITIONER

## 2022-09-01 PROCEDURE — 3078F DIAST BP <80 MM HG: CPT | Mod: ,,, | Performed by: NURSE PRACTITIONER

## 2022-09-01 PROCEDURE — 1159F PR MEDICATION LIST DOCUMENTED IN MEDICAL RECORD: ICD-10-PCS | Mod: ,,, | Performed by: NURSE PRACTITIONER

## 2022-09-01 PROCEDURE — 1159F MED LIST DOCD IN RCRD: CPT | Mod: ,,, | Performed by: NURSE PRACTITIONER

## 2022-09-01 PROCEDURE — 3074F SYST BP LT 130 MM HG: CPT | Mod: ,,, | Performed by: NURSE PRACTITIONER

## 2022-09-01 PROCEDURE — 1101F PR PT FALLS ASSESS DOC 0-1 FALLS W/OUT INJ PAST YR: ICD-10-PCS | Mod: ,,, | Performed by: NURSE PRACTITIONER

## 2022-09-01 PROCEDURE — 3288F FALL RISK ASSESSMENT DOCD: CPT | Mod: ,,, | Performed by: NURSE PRACTITIONER

## 2022-09-01 PROCEDURE — 3074F PR MOST RECENT SYSTOLIC BLOOD PRESSURE < 130 MM HG: ICD-10-PCS | Mod: ,,, | Performed by: NURSE PRACTITIONER

## 2022-09-01 PROCEDURE — 3288F PR FALLS RISK ASSESSMENT DOCUMENTED: ICD-10-PCS | Mod: ,,, | Performed by: NURSE PRACTITIONER

## 2022-09-01 PROCEDURE — 1126F AMNT PAIN NOTED NONE PRSNT: CPT | Mod: ,,, | Performed by: NURSE PRACTITIONER

## 2022-09-01 PROCEDURE — 1101F PT FALLS ASSESS-DOCD LE1/YR: CPT | Mod: ,,, | Performed by: NURSE PRACTITIONER

## 2022-09-01 PROCEDURE — 1126F PR PAIN SEVERITY QUANTIFIED, NO PAIN PRESENT: ICD-10-PCS | Mod: ,,, | Performed by: NURSE PRACTITIONER

## 2022-09-01 PROCEDURE — 3078F PR MOST RECENT DIASTOLIC BLOOD PRESSURE < 80 MM HG: ICD-10-PCS | Mod: ,,, | Performed by: NURSE PRACTITIONER

## 2022-09-01 NOTE — PROGRESS NOTES
Subjective:       Patient ID: Sharee Elizondo is a 82 y.o. female.    Chief Complaint: Follow-up (Follow up on medication that was changed )    Ms. Elizondo presents to follow up recent BMD that revealed osteoporosis, she states she is taking vitamin d and eats dairy daily. She is interested in prolia injection, we will refer her. TSH is normalizing, continue current dose and we will recheck with next lab work.    Follow-up    Review of Systems   Constitutional: Negative.    Respiratory: Negative.     Cardiovascular: Negative.    Gastrointestinal: Negative.    Neurological: Negative.    Psychiatric/Behavioral: Negative.         Objective:      Physical Exam  Vitals and nursing note reviewed.   Constitutional:       Appearance: Normal appearance.   HENT:      Head: Normocephalic.   Cardiovascular:      Rate and Rhythm: Normal rate and regular rhythm.      Pulses: Normal pulses.      Heart sounds: Normal heart sounds.   Pulmonary:      Effort: Pulmonary effort is normal.      Breath sounds: Normal breath sounds.   Abdominal:      General: Bowel sounds are normal.      Palpations: Abdomen is soft.   Skin:     General: Skin is warm and dry.   Neurological:      Mental Status: She is alert and oriented to person, place, and time.   Psychiatric:         Behavior: Behavior normal.       Assessment:       Problem List Items Addressed This Visit    None  Visit Diagnoses       Age-related osteoporosis without current pathological fracture    -  Primary    Relevant Orders    Ambulatory referral/consult to Internal Medicine    Hypothyroidism, unspecified type        Relevant Orders    TSH              Plan:        Referral for prolia injection , discussed calcium and vit d daily recommendations. Continue current dose of levothyroxine, repeat TSH in 8 weeks.

## 2022-09-20 ENCOUNTER — TELEPHONE (OUTPATIENT)
Dept: GASTROENTEROLOGY | Facility: CLINIC | Age: 82
End: 2022-09-20
Payer: MEDICARE

## 2022-09-20 DIAGNOSIS — R13.10 DYSPHAGIA, UNSPECIFIED TYPE: Primary | ICD-10-CM

## 2022-09-20 NOTE — PROGRESS NOTES
Subjective:         Patient ID: Sharee Elizondo is a 82 y.o. female.    Chief Complaint: Low-back Pain and Knee Pain      Pain  This is a chronic problem. The current episode started more than 1 year ago. The problem occurs daily. The problem has been unchanged. Associated symptoms include arthralgias and neck pain. Pertinent negatives include no abdominal pain, change in bowel habit, chest pain, chills, coughing, diaphoresis, fatigue, fever, rash, sore throat, vertigo or vomiting.   Review of Systems   Constitutional:  Negative for activity change, appetite change, chills, diaphoresis, fatigue and fever.   HENT:  Negative for drooling, ear pain, facial swelling, mouth dryness, nosebleeds, sore throat, trouble swallowing, voice change and goiter.    Eyes:  Negative for photophobia, pain, discharge, redness and visual disturbance.   Respiratory:  Negative for apnea, cough, choking, chest tightness, shortness of breath, wheezing and stridor.    Cardiovascular:  Negative for chest pain, palpitations and leg swelling.   Gastrointestinal:  Negative for abdominal distention, abdominal pain, change in bowel habit, diarrhea, rectal pain, vomiting, fecal incontinence and change in bowel habit.   Endocrine: Negative for cold intolerance, heat intolerance, polydipsia, polyphagia and polyuria.   Genitourinary:  Negative for bladder incontinence, dysuria, flank pain, frequency and hot flashes.   Musculoskeletal:  Positive for arthralgias, back pain, gait problem, leg pain and neck pain. Negative for neck stiffness.   Integumentary:  Negative for color change, pallor and rash.   Allergic/Immunologic: Negative for immunocompromised state.   Neurological:  Negative for dizziness, vertigo, seizures, syncope, facial asymmetry, speech difficulty, light-headedness, coordination difficulties, memory loss and coordination difficulties.   Hematological:  Negative for adenopathy. Does not bruise/bleed easily.   Psychiatric/Behavioral:   Negative for agitation, behavioral problems, confusion, decreased concentration, dysphoric mood, hallucinations, self-injury and suicidal ideas. The patient is not nervous/anxious and is not hyperactive.          Past Medical History:   Diagnosis Date    Acute superficial gastritis without hemorrhage 2/18/2022    Atrial fibrillation     Automatic implantable cardiac defibrillator in situ     Bilateral primary osteoarthritis of knee     Cervical spondylosis without myelopathy     CHF (congestive heart failure)     Chronic ischemic heart disease     Chronic kidney disease (CKD), stage III (moderate)     Chronic pain syndrome     COPD (chronic obstructive pulmonary disease)     Coronary arteriosclerosis     Depressive disorder     Esophageal dysphagia 2/18/2022    GERD (gastroesophageal reflux disease)     HH (hiatus hernia) 2/18/2022    Hypothyroidism     Low back pain     Lumbar radiculopathy     Melanoma 2019    scalp    Metabolic syndrome     Myocardial ischemia     Other long term (current) drug therapy     Radiculopathy, cervical region     Type 2 diabetes mellitus      Past Surgical History:   Procedure Laterality Date    BACK SURGERY      bilateral knee injection/ aspiration of joint/ bursa-large  Bilateral 4/4/2017 3/27/2017 3/20/2017    bilateral knee orthovisc injection    CARDIAC SURGERY      EPIDURAL STEROID INJECTION INTO CERVICAL SPINE  6/19/2019 5/17/2017 4/26/2017    C6-7 LONNIE, Dr Farah    EPIDURAL STEROID INJECTION INTO LUMBAR SPINE  03/15/2018    L4-5 LONNIEDr Farah    HYSTERECTOMY      INJECTION OF ANESTHETIC AGENT AROUND MEDIAL BRANCH NERVES INNERVATING LUMBAR FACET JOINT Bilateral 4/14/2022    Procedure: Block-nerve-medial branch-lumbar, bilateral L4 through S1;  Surgeon: Angie Farah MD;  Location: [facility];  Service: Pain Management;  Laterality: Bilateral;  pt aware at visit to be tested  4/12 patient getting covid test today    INJECTION OF FACET JOINT Bilateral 8/17/2017  "7/19/2017    Bilateral C3-7 FI, Dr Farah    INJECTION OF FACET JOINT Left 10/18/2018    left C3-7 FI, Dr Farah    INJECTION OF FACET JOINT Bilateral 8/22/2018 6/29/2018    bilateral L3-S1 FI, Dr Farah    RADIOFREQUENCY ABLATION Right 12/12/2018    Right C3-7 RF, Dr Farah    RADIOFREQUENCY ABLATION Left 01/9/2019 10/16/2017 10/7/2020    Left C3-7 RF, Dr Farah    RADIOFREQUENCY ABLATION Left 1/9/2019 10/16/2017    left C3-7 RF, Dr Farah    RADIOFREQUENCY THERMAL COAGULATION OF MEDIAL BRANCH OF POSTERIOR RAMUS OF CERVICAL SPINAL NERVE Left 8/5/2021    Procedure: RADIOFREQUENCY THERMAL COAGULATION, NERVE, SPINAL, CERVICAL, POSTERIOR RAMUS, MEDIAL BRANCH, Left C3-4,4-5  only 2 levels resubmitted dt denied all levels on 8-3-2021;  Surgeon: Angie Farah MD;  Location[facility];  Service: Pain Management;  Laterality: Left;    RADIOFREQUENCY THERMAL COAGULATION OF MEDIAL BRANCH OF POSTERIOR RAMUS OF CERVICAL SPINAL NERVE Right 8/24/2021    Procedure: RADIOFREQUENCY THERMAL COAGULATION, NERVE, SPINAL, CERVICAL, POSTERIOR RAMUS, MEDIAL BRANCH RIGHT C3-C5 RFTC;  Surgeon: Angie Farah MD;  Location: [facility];  Service: Pain Management;  Laterality: Right;  HAD VAC WILL BRING CARD    TRANSFORAMINAL EPIDURAL INJECTION OF STEROID Right 5/30/2018 5/2/2018    Right L4-5 TFESI, Dr Farah     Social History     Socioeconomic History    Marital status:    Tobacco Use    Smoking status: Every Day     Packs/day: 0.50     Years: 40.00     Pack years: 20.00     Types: Cigarettes    Smokeless tobacco: Never   Substance and Sexual Activity    Alcohol use: Not Currently    Drug use: Never     Family History   Problem Relation Age of Onset    Hypertension Mother     Diabetes Mellitus Mother      Review of patient's allergies indicates:  No Known Allergies     Objective:  Vitals:    09/22/22 1114 09/22/22 1116   BP: 137/72    Pulse: 90    Weight: 65.6 kg (144 lb 11.2 oz)    Height: 5' 7.2" (1.707 m)  "   PainSc:   4   4         Physical Exam  Vitals and nursing note reviewed. Exam conducted with a chaperone present.   Constitutional:       General: She is awake. She is not in acute distress.     Appearance: Normal appearance. She is not ill-appearing or toxic-appearing.   HENT:      Head: Normocephalic and atraumatic.      Nose: Nose normal.      Mouth/Throat:      Mouth: Mucous membranes are moist.      Pharynx: Oropharynx is clear.   Eyes:      Conjunctiva/sclera: Conjunctivae normal.      Pupils: Pupils are equal, round, and reactive to light.   Cardiovascular:      Rate and Rhythm: Normal rate.   Pulmonary:      Effort: Pulmonary effort is normal. No respiratory distress.   Abdominal:      Palpations: Abdomen is soft.      Tenderness: There is no guarding.   Musculoskeletal:         General: Normal range of motion.      Cervical back: Normal range of motion and neck supple. Tenderness present. No rigidity.      Thoracic back: Tenderness present.      Lumbar back: Tenderness present.   Skin:     General: Skin is warm and dry.      Coloration: Skin is not jaundiced or pale.   Neurological:      General: No focal deficit present.      Mental Status: She is alert and oriented to person, place, and time. Mental status is at baseline.      Cranial Nerves: No cranial nerve deficit (II-XII).      Gait: Gait abnormal.   Psychiatric:         Mood and Affect: Mood normal.         Behavior: Behavior normal. Behavior is cooperative.         Thought Content: Thought content normal.         CT Lumbar Spine With Contrast  Narrative: EXAMINATION:  CT LUMBAR SPINE WITH CONTRAST    CLINICAL HISTORY:  Low back pain, symptoms persist with > 6 wks conservative treatment;.  Radiculopathy, lumbar region    COMPARISON:  Noncontrast lumbar CT February 28, 2018    TECHNIQUE:  Spiral CT sections were obtained through the lumbar spine following the intrathecal administration of 15 mL of Isovue 200 without immediate complication. Sagittal  and coronal multiplanar reconstruction images are also examined.    The CT examination was performed using one or more of the following dose reduction techniques: Automated exposure control, adjustment of the mA and kV according to patient's size, use of acute or iterative reconstruction techniques.    FINDINGS:  There is no acute fracture.  There is approximately 3-4 mm anterolisthesis of L4 on L5.  Alignment is otherwise unremarkable.  There is mild degenerative disc narrowing at L3-L4 and L4-L5.  There is scattered mild lumbar spondylosis.    Conus medullaris terminates at L1-L2 and is without gross mass lesion.    T12-L1: Slight posterior diffuse disc bulging without high-grade spinal stenosis.    L1-L2: No significant spinal or neural foraminal stenosis    L2-L3: Mild narrowing of spinal canal secondary to mild posterior diffuse disc bulging as well as mild ligamentum flavum and facet hypertrophy.  There is mild narrowing of the inferior recess of either neural foramen secondary to posterolateral bulging disc.    L3-L4: Mild to moderate narrowing of the spinal canal secondary to mild posterior diffuse disc bulging as well as mild ligamentum flavum and facet hypertrophy.  There is mild narrowing of the inferior recess of either neural foramen secondary to posterolateral bulging disc and facet arthropathy.    L4-L5: Mild to moderate narrowing of spinal canal secondary to mild posterior diffuse disc bulging as well as moderate ligamentum flavum and facet hypertrophy.  There is moderate narrowing of the inferior recess of the right neural foramen secondary to posterolateral bulging disc and facet arthropathy.    L5-S1: No significant spinal or neural foraminal stenosis.  No significant disc bulging.    There is some mild fusiform ectasia of the infrarenal abdominal aorta at 2.4 cm maximum diameter.  Impression: Multilevel mild to moderate narrowing of the spinal canal secondary to mild diffuse disc bulging as well  as hypertrophic changes of the posterior elements.  No focal disc extrusion    Degenerative disc disease    Grade 1 spondylolisthesis L4-L5    Electronically signed by: Bala Daniels  Date:    08/30/2022  Time:    13:42  FL Myelogram Lumbar, COMPLETE  with CT to Follow  Narrative: EXAMINATION:  FL MYELOGRAM LUMBAR WITH CT TO FOLLOW    CLINICAL HISTORY:  Lumbar radiculopathy    TECHNIQUE:  Formal timeout was performed. The patient was placed prone on the fluoroscopy table. The low back was prepped and draped in a sterile fashion. A midline lumbar puncture was then performed at the L2-L3 interspace using a 22-gauge spinal needle. Fluoroscopic guidance was used and a captured image documents the needle position. Once there was a free return of CSF, 15 cc Isovue 200 was then injected under intermittent fluoroscopic observation. Needle was removed and a bandage placed the puncture site. Myelographic images were obtained of the spine and the patient transferred to CT for high-resolution imaging.    Fluoroscopy time: 1.46 minutes.  Seventeen images were archived.    The procedure was performed by OSORIO Nieves under the supervision of this radiologist.    COMPARISON:  No previous similar    FINDINGS:   film shows grade 1 anterolisthesis of L4 on L5.  There is moderate degenerative disc narrowing at L3-L4.  There is mild to moderate lumbar spondylosis.  There is moderate lumbar facet arthropathy.    There are some mild anterior extradural defects at L2-L3 through L4-L5 compatible with mild multilevel disc bulging.  Please refer to the postmyelogram CT for detailed images.  Captured fluoroscopic image documents positioning of the needle tip within the thecal sac at the L2-L3 level.  Impression: Lumbar myelogram performed with technical success.  Please refer to the postmyelogram lumbar CT for additional information    Electronically signed by: Bala Daniels  Date:    08/30/2022  Time:    13:35       [facility]  Outpatient Visit on 08/30/2022   Component Date Value Ref Range Status    PT 08/30/2022 12.8  11.7 - 14.7 seconds Final    INR 08/30/2022 1.00  <=3.30 Final    PTT 08/30/2022 28.8  25.2 - 37.3 seconds Final    WBC 08/30/2022 9.79  4.50 - 11.00 K/uL Final    RBC 08/30/2022 3.87 (L)  4.20 - 5.40 M/uL Final    Hemoglobin 08/30/2022 11.9 (L)  12.0 - 16.0 g/dL Final    Hematocrit 08/30/2022 36.2 (L)  38.0 - 47.0 % Final    MCV 08/30/2022 93.5  80.0 - 96.0 fL Final    MCH 08/30/2022 30.7  27.0 - 31.0 pg Final    MCHC 08/30/2022 32.9  32.0 - 36.0 g/dL Final    RDW 08/30/2022 12.3  11.5 - 14.5 % Final    Platelet Count 08/30/2022 219  150 - 400 K/uL Final    MPV 08/30/2022 10.5  9.4 - 12.4 fL Final    Neutrophils % 08/30/2022 59.3  53.0 - 65.0 % Final    Lymphocytes % 08/30/2022 29.7  27.0 - 41.0 % Final    Monocytes % 08/30/2022 6.3 (H)  2.0 - 6.0 % Final    Eosinophils % 08/30/2022 3.7  1.0 - 4.0 % Final    Basophils % 08/30/2022 0.7  0.0 - 1.0 % Final    Immature Granulocytes % 08/30/2022 0.3  0.0 - 0.4 % Final    nRBC, Auto 08/30/2022 0.0  <=0.0 % Final    Neutrophils, Abs 08/30/2022 5.80  1.80 - 7.70 K/uL Final    Lymphocytes, Absolute 08/30/2022 2.91  1.00 - 4.80 K/uL Final    Monocytes, Absolute 08/30/2022 0.62  0.00 - 0.80 K/uL Final    Eosinophils, Absolute 08/30/2022 0.36  0.00 - 0.50 K/uL Final    Basophils, Absolute 08/30/2022 0.07  0.00 - 0.20 K/uL Final    Immature Granulocytes, Absolute 08/30/2022 0.03  0.00 - 0.04 K/uL Final    nRBC, Absolute 08/30/2022 0.00  <=0.00 x10e3/uL Final    Diff Type 08/30/2022 Auto   Final   Lab Visit on 08/23/2022   Component Date Value Ref Range Status    TSH 08/23/2022 5.360 (H)  0.358 - 3.740 uIU/mL Final    Free T3 08/23/2022 2.02 (L)  2.18 - 3.98 pg/mL Final   Office Visit on 08/17/2022   Component Date Value Ref Range Status    POC Amphetamines 08/17/2022 Negative  Negative, Inconclusive Final    POC Barbiturates 08/17/2022 Negative  Negative, Inconclusive Final    POC  Benzodiazepines 08/17/2022 Negative  Negative, Inconclusive Final    POC Cocaine 08/17/2022 Negative  Negative, Inconclusive Final    POC THC 08/17/2022 Negative  Negative, Inconclusive Final    POC Methadone 08/17/2022 Negative  Negative, Inconclusive Final    POC Methamphetamine 08/17/2022 Negative  Negative, Inconclusive Final    POC Opiates 08/17/2022 Presumptive Positive (A)  Negative, Inconclusive Final    POC Oxycodone 08/17/2022 Negative  Negative, Inconclusive Final    POC Phencyclidine 08/17/2022 Negative  Negative, Inconclusive Final    POC Methylenedioxymethamphetamine * 08/17/2022 Negative  Negative, Inconclusive Final    POC Tricyclic Antidepressants 08/17/2022 Negative  Negative, Inconclusive Final    POC Buprenorphine 08/17/2022 Negative   Final     Acceptable 08/17/2022 Yes   Final    POC Temperature (Urine) 08/17/2022 92   Final   Office Visit on 07/26/2022   Component Date Value Ref Range Status    POC Amphetamines 07/26/2022 Negative  Negative, Inconclusive Final    POC Barbiturates 07/26/2022 Negative  Negative, Inconclusive Final    POC Benzodiazepines 07/26/2022 Negative  Negative, Inconclusive Final    POC Cocaine 07/26/2022 Negative  Negative, Inconclusive Final    POC THC 07/26/2022 Negative  Negative, Inconclusive Final    POC Methadone 07/26/2022 Negative  Negative, Inconclusive Final    POC Methamphetamine 07/26/2022 Negative  Negative, Inconclusive Final    POC Opiates 07/26/2022 Presumptive Positive (A)  Negative, Inconclusive Final    POC Oxycodone 07/26/2022 Negative  Negative, Inconclusive Final    POC Phencyclidine 07/26/2022 Negative  Negative, Inconclusive Final    POC Methylenedioxymethamphetamine * 07/26/2022 Negative  Negative, Inconclusive Final    POC Tricyclic Antidepressants 07/26/2022 Negative  Negative, Inconclusive Final    POC Buprenorphine 07/26/2022 Negative   Final     Acceptable 07/26/2022 Yes   Final    POC Temperature (Urine)  07/26/2022 94   Final   Lab Visit on 07/21/2022   Component Date Value Ref Range Status    Sodium 07/21/2022 137  136 - 145 mmol/L Final    Potassium 07/21/2022 3.9  3.5 - 5.1 mmol/L Final    Chloride 07/21/2022 101  98 - 107 mmol/L Final    CO2 07/21/2022 26  21 - 32 mmol/L Final    Anion Gap 07/21/2022 14  7 - 16 mmol/L Final    Glucose 07/21/2022 105  74 - 106 mg/dL Final    BUN 07/21/2022 20 (H)  7 - 18 mg/dL Final    Creatinine 07/21/2022 1.18 (H)  0.55 - 1.02 mg/dL Final    BUN/Creatinine Ratio 07/21/2022 17  6 - 20 Final    Calcium 07/21/2022 9.7  8.5 - 10.1 mg/dL Final    Total Protein 07/21/2022 7.4  6.4 - 8.2 g/dL Final    Albumin 07/21/2022 4.0  3.5 - 5.0 g/dL Final    Globulin 07/21/2022 3.4  2.0 - 4.0 g/dL Final    A/G Ratio 07/21/2022 1.2   Final    Bilirubin, Total 07/21/2022 0.4  0.0 - 1.2 mg/dL Final    Alk Phos 07/21/2022 100  55 - 142 U/L Final    ALT 07/21/2022 15  13 - 56 U/L Final    AST 07/21/2022 11 (L)  15 - 37 U/L Final    eGFR 07/21/2022 47 (L)  >=60 mL/min/1.73m² Final    Triglycerides 07/21/2022 223 (H)  35 - 150 mg/dL Final    Cholesterol 07/21/2022 136  0 - 200 mg/dL Final    HDL Cholesterol 07/21/2022 39 (L)  40 - 60 mg/dL Final    Cholesterol/HDL Ratio (Risk Factor) 07/21/2022 3.5   Final    Non-HDL 07/21/2022 97  mg/dL Final    LDL Calculated 07/21/2022 52  mg/dL Final    LDL/HDL 07/21/2022 1.3   Final    VLDL 07/21/2022 45  mg/dL Final    Hemoglobin A1C 07/21/2022 6.5  4.5 - 6.6 % Final    Estimated Average Glucose 07/21/2022 130  mg/dL Final    TSH 07/21/2022 13.400 (H)  0.358 - 3.740 uIU/mL Final    WBC 07/21/2022 8.56  4.50 - 11.00 K/uL Final    RBC 07/21/2022 5.02  4.20 - 5.40 M/uL Final    Hemoglobin 07/21/2022 15.7  12.0 - 16.0 g/dL Final    Hematocrit 07/21/2022 45.9  38.0 - 47.0 % Final    MCV 07/21/2022 91.4  80.0 - 96.0 fL Final    MCH 07/21/2022 31.3 (H)  27.0 - 31.0 pg Final    MCHC 07/21/2022 34.2  32.0 - 36.0 g/dL Final    RDW 07/21/2022 13.0  11.5 - 14.5 % Final     Platelet Count 07/21/2022 243  150 - 400 K/uL Final    MPV 07/21/2022 11.1  9.4 - 12.4 fL Final    Neutrophils % 07/21/2022 43.2 (L)  53.0 - 65.0 % Final    Lymphocytes % 07/21/2022 45.8 (H)  27.0 - 41.0 % Final    Monocytes % 07/21/2022 6.2 (H)  2.0 - 6.0 % Final    Eosinophils % 07/21/2022 3.6  1.0 - 4.0 % Final    Basophils % 07/21/2022 1.1 (H)  0.0 - 1.0 % Final    Immature Granulocytes % 07/21/2022 0.1  0.0 - 0.4 % Final    nRBC, Auto 07/21/2022 0.0  <=0.0 % Final    Neutrophils, Abs 07/21/2022 3.70  1.80 - 7.70 K/uL Final    Lymphocytes, Absolute 07/21/2022 3.92  1.00 - 4.80 K/uL Final    Monocytes, Absolute 07/21/2022 0.53  0.00 - 0.80 K/uL Final    Eosinophils, Absolute 07/21/2022 0.31  0.00 - 0.50 K/uL Final    Basophils, Absolute 07/21/2022 0.09  0.00 - 0.20 K/uL Final    Immature Granulocytes, Absolute 07/21/2022 0.01  0.00 - 0.04 K/uL Final    nRBC, Absolute 07/21/2022 0.00  <=0.00 x10e3/uL Final    Diff Type 07/21/2022 Auto   Final   Appointment on 04/12/2022   Component Date Value Ref Range Status    SARS Coronavirus 2 Antigen 04/12/2022 Negative  Negative Final     Acceptable 04/12/2022 Yes   Final   Office Visit on 03/24/2022   Component Date Value Ref Range Status    POC Amphetamines 03/24/2022 Negative  Negative, Inconclusive Final    POC Barbiturates 03/24/2022 Negative  Negative, Inconclusive Final    POC Benzodiazepines 03/24/2022 Negative  Negative, Inconclusive Final    POC Cocaine 03/24/2022 Negative  Negative, Inconclusive Final    POC THC 03/24/2022 Negative  Negative, Inconclusive Final    POC Methadone 03/24/2022 Negative  Negative, Inconclusive Final    POC Methamphetamine 03/24/2022 Negative  Negative, Inconclusive Final    POC Opiates 03/24/2022 Presumptive Positive (A)  Negative, Inconclusive Final    POC Oxycodone 03/24/2022 Negative  Negative, Inconclusive Final    POC Phencyclidine 03/24/2022 Negative  Negative, Inconclusive Final    POC Methylenedioxymethamphetamine  * 03/24/2022 Negative  Negative, Inconclusive Final    POC Tricyclic Antidepressants 03/24/2022 Negative  Negative, Inconclusive Final    POC Buprenorphine 03/24/2022 Negative   Final     Acceptable 03/24/2022 Yes   Final    POC Temperature (Urine) 03/24/2022 92   Final         No orders of the defined types were placed in this encounter.      Requested Prescriptions     Pending Prescriptions Disp Refills    gabapentin (NEURONTIN) 100 MG capsule 270 capsule 2     Sig: Take 1 capsule (100 mg total) by mouth every 8 (eight) hours.    HYDROcodone-acetaminophen (NORCO) 5-325 mg per tablet 90 tablet 0     Sig: Take 1 tablet by mouth every 8 (eight) hours.    HYDROcodone-acetaminophen (NORCO) 5-325 mg per tablet 90 tablet 0     Sig: Take 1 tablet by mouth every 8 (eight) hours.    HYDROcodone-acetaminophen (NORCO) 5-325 mg per tablet 90 tablet 0     Sig: Take 1 tablet by mouth every 8 (eight) hours.       Assessment:     1. Lumbosacral spondylosis without myelopathy    2. 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 Lumbar radiculopathy         A's of Opioid Risk Assessment  Activity:Patient can perform ADL.   Analgesia:Patients pain is partially controlled by current medication. Patient has tried OTC medications such as Tylenol and Ibuprofen with out relief.   Adverse Effects: Patient denies constipation or sedation.  Aberrant Behavior:  reviewed with no aberrant drug seeking/taking behavior.  Overdose reversal drug naloxone discussed    Drug screen reviewed      Plan:    Cardiac defibrillator cannot have MRI    History lumbar surgery 2005, Dr. Woods [facility]    She had bilateral lumbar L4 through S1 medial branch block # 1, April 2022, she states she had 85% relief after procedure notes ongoing, she states that procedure did help increase her level function    Complaining bilateral leg pain burning numbness and  tingling radicular in nature she states her sciatica/radicular type pain is increasing with time she is requesting further options requesting investigational studies    Lumbar myelogram with post contrast CT [facility] multiple level degenerative changes lumbar stenosis L4/5 less so at L3/4 postop changes noted L4/5    Complaining right knee pain worse with standing walking ongoing more than 3 months she is requesting right knee injection she states she is had steroid injections right knee previously it does help control her discomfort     Tender bilateral sacroiliac area  Bilateral sacroiliac compression test positive  Adrian's /Jose's positive bilateral  Gaenslen positive bilateral    X-ray right knee [facility] September 4, 2020 degenerative changes noted no fracture noted    X-ray lumbar spine August 17, 2022 multiple level degenerative changes    I have given the patient medically directed home exercise program    Right intra-articular knee injection today     She is considering genicular nerve block considering procedures for her back pain considering sacroiliac procedure    Follow-up 3 months sooner if needed    Dr. Farah, April 2023    Bring original prescription medication bottles/container/box with labels to each visit    Pill count    Physical therapy    Massage therapy declines

## 2022-09-22 ENCOUNTER — OFFICE VISIT (OUTPATIENT)
Dept: PAIN MEDICINE | Facility: CLINIC | Age: 82
End: 2022-09-22
Payer: MEDICARE

## 2022-09-22 VITALS
HEIGHT: 67 IN | HEART RATE: 87 BPM | BODY MASS INDEX: 22.71 KG/M2 | SYSTOLIC BLOOD PRESSURE: 110 MMHG | WEIGHT: 144.69 LBS | DIASTOLIC BLOOD PRESSURE: 59 MMHG

## 2022-09-22 DIAGNOSIS — M89.49 OSTEOARTHROSIS MULTIPLE SITES, NOT SPECIFIED AS GENERALIZED: Chronic | ICD-10-CM

## 2022-09-22 DIAGNOSIS — M25.561 CHRONIC PAIN OF RIGHT KNEE: Primary | ICD-10-CM

## 2022-09-22 DIAGNOSIS — M47.812 SPONDYLOSIS OF CERVICAL JOINT WITHOUT MYELOPATHY: Chronic | ICD-10-CM

## 2022-09-22 DIAGNOSIS — G89.29 CHRONIC PAIN OF RIGHT KNEE: Primary | ICD-10-CM

## 2022-09-22 DIAGNOSIS — M54.16 LUMBAR RADICULOPATHY: Chronic | ICD-10-CM

## 2022-09-22 PROCEDURE — 20610 DRAIN/INJ JOINT/BURSA W/O US: CPT | Mod: PBBFAC,RT | Performed by: PHYSICIAN ASSISTANT

## 2022-09-22 PROCEDURE — 3074F PR MOST RECENT SYSTOLIC BLOOD PRESSURE < 130 MM HG: ICD-10-PCS | Mod: CPTII,,, | Performed by: PHYSICIAN ASSISTANT

## 2022-09-22 PROCEDURE — 1159F MED LIST DOCD IN RCRD: CPT | Mod: CPTII,,, | Performed by: PHYSICIAN ASSISTANT

## 2022-09-22 PROCEDURE — 1125F AMNT PAIN NOTED PAIN PRSNT: CPT | Mod: CPTII,,, | Performed by: PHYSICIAN ASSISTANT

## 2022-09-22 PROCEDURE — 1101F PR PT FALLS ASSESS DOC 0-1 FALLS W/OUT INJ PAST YR: ICD-10-PCS | Mod: CPTII,,, | Performed by: PHYSICIAN ASSISTANT

## 2022-09-22 PROCEDURE — 99214 PR OFFICE/OUTPT VISIT, EST, LEVL IV, 30-39 MIN: ICD-10-PCS | Mod: 25,S$PBB,, | Performed by: PHYSICIAN ASSISTANT

## 2022-09-22 PROCEDURE — 1159F PR MEDICATION LIST DOCUMENTED IN MEDICAL RECORD: ICD-10-PCS | Mod: CPTII,,, | Performed by: PHYSICIAN ASSISTANT

## 2022-09-22 PROCEDURE — 1125F PR PAIN SEVERITY QUANTIFIED, PAIN PRESENT: ICD-10-PCS | Mod: CPTII,,, | Performed by: PHYSICIAN ASSISTANT

## 2022-09-22 PROCEDURE — 3078F DIAST BP <80 MM HG: CPT | Mod: CPTII,,, | Performed by: PHYSICIAN ASSISTANT

## 2022-09-22 PROCEDURE — 99214 OFFICE O/P EST MOD 30 MIN: CPT | Mod: 25,S$PBB,, | Performed by: PHYSICIAN ASSISTANT

## 2022-09-22 PROCEDURE — 3078F PR MOST RECENT DIASTOLIC BLOOD PRESSURE < 80 MM HG: ICD-10-PCS | Mod: CPTII,,, | Performed by: PHYSICIAN ASSISTANT

## 2022-09-22 PROCEDURE — 99215 OFFICE O/P EST HI 40 MIN: CPT | Mod: PBBFAC,25 | Performed by: PHYSICIAN ASSISTANT

## 2022-09-22 PROCEDURE — 20610 LARGE JOINT ASPIRATION/INJECTION: R KNEE: ICD-10-PCS | Mod: S$PBB,RT,, | Performed by: PHYSICIAN ASSISTANT

## 2022-09-22 PROCEDURE — 3288F FALL RISK ASSESSMENT DOCD: CPT | Mod: CPTII,,, | Performed by: PHYSICIAN ASSISTANT

## 2022-09-22 PROCEDURE — 1101F PT FALLS ASSESS-DOCD LE1/YR: CPT | Mod: CPTII,,, | Performed by: PHYSICIAN ASSISTANT

## 2022-09-22 PROCEDURE — 3074F SYST BP LT 130 MM HG: CPT | Mod: CPTII,,, | Performed by: PHYSICIAN ASSISTANT

## 2022-09-22 PROCEDURE — 3288F PR FALLS RISK ASSESSMENT DOCUMENTED: ICD-10-PCS | Mod: CPTII,,, | Performed by: PHYSICIAN ASSISTANT

## 2022-09-22 RX ORDER — HYDROCODONE BITARTRATE AND ACETAMINOPHEN 5; 325 MG/1; MG/1
1 TABLET ORAL EVERY 8 HOURS
Qty: 90 TABLET | Refills: 0 | Status: SHIPPED | OUTPATIENT
Start: 2022-11-24 | End: 2022-11-17 | Stop reason: SDUPTHER

## 2022-09-22 RX ORDER — GABAPENTIN 100 MG/1
100 CAPSULE ORAL EVERY 8 HOURS
Qty: 270 CAPSULE | Refills: 2 | Status: SHIPPED | OUTPATIENT
Start: 2022-09-22 | End: 2022-11-17 | Stop reason: SDUPTHER

## 2022-09-22 RX ORDER — BUPIVACAINE HYDROCHLORIDE 2.5 MG/ML
2 INJECTION, SOLUTION EPIDURAL; INFILTRATION; INTRACAUDAL
Status: DISCONTINUED | OUTPATIENT
Start: 2022-09-22 | End: 2022-09-22 | Stop reason: HOSPADM

## 2022-09-22 RX ORDER — HYDROCODONE BITARTRATE AND ACETAMINOPHEN 5; 325 MG/1; MG/1
1 TABLET ORAL EVERY 8 HOURS
Qty: 90 TABLET | Refills: 0 | Status: SHIPPED | OUTPATIENT
Start: 2022-10-25 | End: 2022-11-22

## 2022-09-22 RX ORDER — TRIAMCINOLONE ACETONIDE 40 MG/ML
40 INJECTION, SUSPENSION INTRA-ARTICULAR; INTRAMUSCULAR
Status: DISCONTINUED | OUTPATIENT
Start: 2022-09-22 | End: 2022-09-22 | Stop reason: HOSPADM

## 2022-09-22 RX ORDER — HYDROCODONE BITARTRATE AND ACETAMINOPHEN 5; 325 MG/1; MG/1
1 TABLET ORAL EVERY 8 HOURS
Qty: 90 TABLET | Refills: 0 | Status: SHIPPED | OUTPATIENT
Start: 2022-09-24 | End: 2022-10-24

## 2022-09-22 RX ADMIN — BUPIVACAINE HYDROCHLORIDE 2 ML: 2.5 INJECTION, SOLUTION EPIDURAL; INFILTRATION; INTRACAUDAL; PERINEURAL at 10:09

## 2022-09-22 RX ADMIN — TRIAMCINOLONE ACETONIDE 40 MG: 40 INJECTION, SUSPENSION INTRA-ARTICULAR; INTRAMUSCULAR at 10:09

## 2022-09-22 NOTE — PROCEDURES
Large Joint Aspiration/Injection: R knee    Date/Time: 9/22/2022 10:45 AM  Performed by: LUCY Davidson  Authorized by: LUCY Davidson     Consent Done?:  Yes (Written)  Indications:  Arthritis and pain  Site marked: the procedure site was marked    Timeout: prior to procedure the correct patient, procedure, and site was verified    Prep: patient was prepped and draped in usual sterile fashion      Details:  Needle Size:  22 G  Approach:  Lateral  Location:  Knee  Site:  R knee  Medications:  2 mL BUPivacaine (PF) 0.25% (2.5 mg/ml) 0.25 % (2.5 mg/mL); 40 mg triamcinolone acetonide 40 mg/mL  Aspirate amount (mL):  0  Patient tolerance:  Patient tolerated the procedure well with no immediate complications     Bupivacaine 0.25% 25 mg/ 10 ml , 2 cc     Tolerated procedure well    No complication noted    Band-Aid was applied

## 2022-09-28 ENCOUNTER — ANESTHESIA EVENT (OUTPATIENT)
Dept: GASTROENTEROLOGY | Facility: HOSPITAL | Age: 82
End: 2022-09-28
Payer: MEDICARE

## 2022-09-28 ENCOUNTER — HOSPITAL ENCOUNTER (OUTPATIENT)
Dept: GASTROENTEROLOGY | Facility: HOSPITAL | Age: 82
Discharge: HOME | End: 2022-09-28
Attending: INTERNAL MEDICINE
Payer: MEDICARE

## 2022-09-28 ENCOUNTER — ANESTHESIA (OUTPATIENT)
Dept: GASTROENTEROLOGY | Facility: HOSPITAL | Age: 82
End: 2022-09-28
Payer: MEDICARE

## 2022-09-28 VITALS
DIASTOLIC BLOOD PRESSURE: 76 MMHG | DIASTOLIC BLOOD PRESSURE: 61 MMHG | WEIGHT: 144 LBS | TEMPERATURE: 97 F | OXYGEN SATURATION: 100 % | RESPIRATION RATE: 19 BRPM | SYSTOLIC BLOOD PRESSURE: 135 MMHG | SYSTOLIC BLOOD PRESSURE: 107 MMHG | BODY MASS INDEX: 25.52 KG/M2 | HEART RATE: 97 BPM | TEMPERATURE: 97 F | HEART RATE: 80 BPM | RESPIRATION RATE: 20 BRPM | OXYGEN SATURATION: 98 % | HEIGHT: 63 IN

## 2022-09-28 DIAGNOSIS — R13.10 DYSPHAGIA, UNSPECIFIED TYPE: ICD-10-CM

## 2022-09-28 DIAGNOSIS — R13.19 ESOPHAGEAL DYSPHAGIA: Primary | ICD-10-CM

## 2022-09-28 DIAGNOSIS — K29.00 ACUTE SUPERFICIAL GASTRITIS WITHOUT HEMORRHAGE: ICD-10-CM

## 2022-09-28 LAB
GLUCOSE SERPL-MCNC: 117 MG/DL (ref 70–110)
GLUCOSE SERPL-MCNC: 119 MG/DL (ref 70–105)

## 2022-09-28 PROCEDURE — 27000284 HC CANNULA NASAL: Performed by: NURSE ANESTHETIST, CERTIFIED REGISTERED

## 2022-09-28 PROCEDURE — 82962 GLUCOSE BLOOD TEST: CPT | Mod: 91

## 2022-09-28 PROCEDURE — D9220A PRA ANESTHESIA: ICD-10-PCS | Mod: ,,, | Performed by: NURSE ANESTHETIST, CERTIFIED REGISTERED

## 2022-09-28 PROCEDURE — 43239 EGD BIOPSY SINGLE/MULTIPLE: CPT

## 2022-09-28 PROCEDURE — D9220A PRA ANESTHESIA: Mod: ,,, | Performed by: NURSE ANESTHETIST, CERTIFIED REGISTERED

## 2022-09-28 PROCEDURE — 27201423 OPTIME MED/SURG SUP & DEVICES STERILE SUPPLY

## 2022-09-28 PROCEDURE — 63600175 PHARM REV CODE 636 W HCPCS: Performed by: NURSE ANESTHETIST, CERTIFIED REGISTERED

## 2022-09-28 PROCEDURE — 37000008 HC ANESTHESIA 1ST 15 MINUTES

## 2022-09-28 PROCEDURE — 43450 DILATE ESOPHAGUS 1/MULT PASS: CPT

## 2022-09-28 PROCEDURE — 27000716 HC OXISENSOR PROBE, ANY SIZE: Performed by: NURSE ANESTHETIST, CERTIFIED REGISTERED

## 2022-09-28 PROCEDURE — 25000003 PHARM REV CODE 250: Performed by: NURSE ANESTHETIST, CERTIFIED REGISTERED

## 2022-09-28 PROCEDURE — 00731 ANES UPR GI NDSC PX NOS: CPT

## 2022-09-28 RX ORDER — LIDOCAINE HYDROCHLORIDE 20 MG/ML
INJECTION, SOLUTION EPIDURAL; INFILTRATION; INTRACAUDAL; PERINEURAL
Status: DISCONTINUED | OUTPATIENT
Start: 2022-09-28 | End: 2022-09-28

## 2022-09-28 RX ORDER — SODIUM CHLORIDE 0.9 % (FLUSH) 0.9 %
10 SYRINGE (ML) INJECTION
Status: CANCELLED | OUTPATIENT
Start: 2022-09-28

## 2022-09-28 RX ORDER — PHENYLEPHRINE HYDROCHLORIDE 10 MG/ML
INJECTION INTRAVENOUS
Status: DISCONTINUED | OUTPATIENT
Start: 2022-09-28 | End: 2022-09-28

## 2022-09-28 RX ORDER — PROPOFOL 10 MG/ML
VIAL (ML) INTRAVENOUS
Status: DISCONTINUED | OUTPATIENT
Start: 2022-09-28 | End: 2022-09-28

## 2022-09-28 RX ADMIN — SODIUM CHLORIDE: 9 INJECTION, SOLUTION INTRAVENOUS at 02:09

## 2022-09-28 RX ADMIN — PROPOFOL 40 MG: 10 INJECTION, EMULSION INTRAVENOUS at 02:09

## 2022-09-28 RX ADMIN — LIDOCAINE HYDROCHLORIDE 50 MG: 20 INJECTION, SOLUTION INTRAVENOUS at 02:09

## 2022-09-28 RX ADMIN — PHENYLEPHRINE HYDROCHLORIDE 50 MCG: 10 INJECTION INTRAVENOUS at 02:09

## 2022-09-28 NOTE — TRANSFER OF CARE
"Anesthesia Transfer of Care Note    Patient: Sharee Elizondo    Procedure(s) Performed: * No procedures listed *    Patient location: GI    Anesthesia Type: general    Transport from OR: Transported from OR on room air with adequate spontaneous ventilation. Continuous ECG monitoring in transport. Continuous SpO2 monitoring in transport    Post pain: adequate analgesia    Post assessment: no apparent anesthetic complications    Post vital signs: stable    Level of consciousness: sedated and responds to stimulation    Nausea/Vomiting: no nausea/vomiting    Complications: none    Transfer of care protocol was followedComments: Good SV continue, NAD, VSS, RTRN      Last vitals:   Visit Vitals  /76 (BP Location: Left arm, Patient Position: Lying)   Pulse 80   Temp 36.1 °C (97 °F) (Skin)   Resp 16   Ht 5' 3" (1.6 m)   Wt 65.3 kg (144 lb)   SpO2 100%   Breastfeeding No   BMI 25.51 kg/m²     "

## 2022-09-28 NOTE — DISCHARGE INSTRUCTIONS
Procedure Date  9/28/22     Impression  Overall Impression: Mucosa of the esophagus is grossly normal. The esophagus was dilated with a 48F Bejarano. The stomach has erythema and biopsies were obtained. The pyloric channel is patent. Mucosa of the duodenum is normal.     Recommendation    Await pathology results      Avoid nsaids; ppi 1/AM; repeat dilation prn.    NO DRIVING, OPERATING EQUIPMENT, OR SIGNING LEGAL DOCUMENTS FOR 24 HOURS.  THE NURSE WILL CALL YOU WITH YOUR BIOPSY RESULTS IN A FEW DAYS.

## 2022-09-28 NOTE — H&P
[facility] - Endoscopy  Gastroenterology  H&P    Patient Name: Sharee Elizondo  MRN: [id number]  Admission Date: 9/28/2022  Code Status: No Order    Attending Provider: Michael Long MD   Primary Care Physician: JOSELITO Mora  Principal Problem:<principal problem not specified>    Subjective:     History of Present Illness: Pt has esophageal dysphagia; for egd with dilation.    Past Medical History:   Diagnosis Date    Acute superficial gastritis without hemorrhage 2/18/2022    Atrial fibrillation     Automatic implantable cardiac defibrillator in situ     Bilateral primary osteoarthritis of knee     Cervical spondylosis without myelopathy     CHF (congestive heart failure)     Chronic ischemic heart disease     Chronic kidney disease (CKD), stage III (moderate)     Chronic pain syndrome     COPD (chronic obstructive pulmonary disease)     Coronary arteriosclerosis     Depressive disorder     Esophageal dysphagia 2/18/2022    GERD (gastroesophageal reflux disease)     HH (hiatus hernia) 2/18/2022    Hypothyroidism     Low back pain     Lumbar radiculopathy     Melanoma 2019    scalp    Metabolic syndrome     Myocardial ischemia     Other long term (current) drug therapy     Radiculopathy, cervical region     Type 2 diabetes mellitus        Past Surgical History:   Procedure Laterality Date    BACK SURGERY      bilateral knee injection/ aspiration of joint/ bursa-large  Bilateral 4/4/2017 3/27/2017 3/20/2017    bilateral knee orthovisc injection    CARDIAC SURGERY      EPIDURAL STEROID INJECTION INTO CERVICAL SPINE  6/19/2019 5/17/2017 4/26/2017    C6-7 LONNIEDr Farah    EPIDURAL STEROID INJECTION INTO LUMBAR SPINE  03/15/2018    L4-5 LONNIEDr Farah    HYSTERECTOMY      INJECTION OF ANESTHETIC AGENT AROUND MEDIAL BRANCH NERVES INNERVATING LUMBAR FACET JOINT Bilateral 4/14/2022    Procedure: Block-nerve-medial branch-lumbar, bilateral L4 through S1;  Surgeon: Angie Farah MD;  Location: [facility];   Service: Pain Management;  Laterality: Bilateral;  pt aware at visit to be tested  4/12 patient getting covid test today    INJECTION OF FACET JOINT Bilateral 8/17/2017 7/19/2017    Bilateral C3-7 FI, Dr Farah    INJECTION OF FACET JOINT Left 10/18/2018    left C3-7 FI, Dr Farah    INJECTION OF FACET JOINT Bilateral 8/22/2018 6/29/2018    bilateral L3-S1 FI, Dr Farah    RADIOFREQUENCY ABLATION Right 12/12/2018    Right C3-7 RF, Dr Farah    RADIOFREQUENCY ABLATION Left 01/9/2019 10/16/2017 10/7/2020    Left C3-7 RF, Dr Farah    RADIOFREQUENCY ABLATION Left 1/9/2019 10/16/2017    left C3-7 RF, Dr Farah    RADIOFREQUENCY THERMAL COAGULATION OF MEDIAL BRANCH OF POSTERIOR RAMUS OF CERVICAL SPINAL NERVE Left 8/5/2021    Procedure: RADIOFREQUENCY THERMAL COAGULATION, NERVE, SPINAL, CERVICAL, POSTERIOR RAMUS, MEDIAL BRANCH, Left C3-4,4-5  only 2 levels resubmitted dt denied all levels on 8-3-2021;  Surgeon: Angie Farah MD;  Location: [facility];  Service: Pain Management;  Laterality: Left;    RADIOFREQUENCY THERMAL COAGULATION OF MEDIAL BRANCH OF POSTERIOR RAMUS OF CERVICAL SPINAL NERVE Right 8/24/2021    Procedure: RADIOFREQUENCY THERMAL COAGULATION, NERVE, SPINAL, CERVICAL, POSTERIOR RAMUS, MEDIAL BRANCH RIGHT C3-C5 RFTC;  Surgeon: Angie Farah MD;  Location: [facility];  Service: Pain Management;  Laterality: Right;  HAD VAC WILL BRING CARD    TRANSFORAMINAL EPIDURAL INJECTION OF STEROID Right 5/30/2018 5/2/2018    Right L4-5 TFESI, Dr Farah       Review of patient's allergies indicates:  No Known Allergies  Family History       Problem Relation (Age of Onset)    Diabetes Mellitus Mother    Hypertension Mother          Tobacco Use    Smoking status: Every Day     Packs/day: 0.50     Years: 40.00     Pack years: 20.00     Types: Cigarettes    Smokeless tobacco: Never   Substance and Sexual Activity    Alcohol use: Not Currently    Drug use: Never    Sexual activity: Not on file     Review  of Systems   Respiratory: Negative.     Cardiovascular: Negative.    Gastrointestinal: Negative.    Objective:     Vital Signs (Most Recent):  Temp: 98.3 °F (36.8 °C) (09/28/22 1249)  Pulse: 78 (09/28/22 1249)  Resp: 12 (09/28/22 1249)  BP: 122/67 (09/28/22 1249)  SpO2: 100 % (09/28/22 1249) Vital Signs (24h Range):  Temp:  [98.3 °F (36.8 °C)] 98.3 °F (36.8 °C)  Pulse:  [78] 78  Resp:  [12] 12  SpO2:  [100 %] 100 %  BP: (122)/(67) 122/67     Weight: 65.3 kg (144 lb) (09/28/22 1249)  Body mass index is 25.51 kg/m².    No intake or output data in the 24 hours ending 09/28/22 1408    Lines/Drains/Airways       Peripheral Intravenous Line  Duration                  Peripheral IV - Single Lumen 09/28/22 1300 22 G Right Antecubital <1 day                    Physical Exam  Vitals reviewed.   Constitutional:       General: She is not in acute distress.     Appearance: Normal appearance. She is well-developed. She is not ill-appearing.   HENT:      Head: Normocephalic and atraumatic.      Nose: Nose normal.   Eyes:      Pupils: Pupils are equal, round, and reactive to light.   Cardiovascular:      Rate and Rhythm: Normal rate and regular rhythm.   Pulmonary:      Effort: Pulmonary effort is normal.      Breath sounds: Normal breath sounds. No wheezing.   Abdominal:      General: Abdomen is flat. Bowel sounds are normal. There is no distension.      Palpations: Abdomen is soft.      Tenderness: There is no abdominal tenderness. There is no guarding.   Skin:     General: Skin is warm and dry.      Coloration: Skin is not jaundiced.   Neurological:      Mental Status: She is alert.   Psychiatric:         Attention and Perception: Attention normal.         Mood and Affect: Affect normal.         Speech: Speech normal.         Behavior: Behavior is cooperative.      Comments: Pt was calm while speaking.       Significant Labs:  CBC: No results for input(s): WBC, HGB, HCT, PLT in the last 48 hours.  CMP: No results for input(s):  GLU, CALCIUM, ALBUMIN, PROT, NA, K, CO2, CL, BUN, CREATININE, ALKPHOS, ALT, AST, BILITOT in the last 48 hours.    Significant Imaging:  Imaging results within the past 24 hours have been reviewed.    Assessment/Plan:     There are no hospital problems to display for this patient.        Imp: esophageal dysphagia  Plan: egd with dilation    Michael Long MD  Gastroenterology  [facility] - Endoscopy

## 2022-09-28 NOTE — ANESTHESIA PREPROCEDURE EVALUATION
09/28/2022  Sharee Elizondo is a 82 y.o., female.    Past Medical History:   Diagnosis Date    Acute superficial gastritis without hemorrhage 2/18/2022    Atrial fibrillation     Automatic implantable cardiac defibrillator in situ     Bilateral primary osteoarthritis of knee     Cervical spondylosis without myelopathy     CHF (congestive heart failure)     Chronic ischemic heart disease     Chronic kidney disease (CKD), stage III (moderate)     Chronic pain syndrome     COPD (chronic obstructive pulmonary disease)     Coronary arteriosclerosis     Depressive disorder     Esophageal dysphagia 2/18/2022    GERD (gastroesophageal reflux disease)     HH (hiatus hernia) 2/18/2022    Hypothyroidism     Low back pain     Lumbar radiculopathy     Melanoma 2019    scalp    Metabolic syndrome     Myocardial ischemia     Other long term (current) drug therapy     Radiculopathy, cervical region     Type 2 diabetes mellitus        Past Surgical History:   Procedure Laterality Date    BACK SURGERY      bilateral knee injection/ aspiration of joint/ bursa-large  Bilateral 4/4/2017 3/27/2017 3/20/2017    bilateral knee orthovisc injection    CARDIAC SURGERY      EPIDURAL STEROID INJECTION INTO CERVICAL SPINE  6/19/2019 5/17/2017 4/26/2017    C6-7 LONNIEDr Farah    EPIDURAL STEROID INJECTION INTO LUMBAR SPINE  03/15/2018    L4-5 LONNIEDr Farah    HYSTERECTOMY      INJECTION OF ANESTHETIC AGENT AROUND MEDIAL BRANCH NERVES INNERVATING LUMBAR FACET JOINT Bilateral 4/14/2022    Procedure: Block-nerve-medial branch-lumbar, bilateral L4 through S1;  Surgeon: Angie Farah MD;  Location: [facility];  Service: Pain Management;  Laterality: Bilateral;  pt aware at visit to be tested  4/12 patient getting covid test today    INJECTION OF FACET JOINT Bilateral 8/17/2017 7/19/2017    Bilateral C3-7  FI, Dr Farah    INJECTION OF FACET JOINT Left 10/18/2018    left C3-7 FI, Dr Farah    INJECTION OF FACET JOINT Bilateral 8/22/2018 6/29/2018    bilateral L3-S1 FI, Dr Farah    RADIOFREQUENCY ABLATION Right 12/12/2018    Right C3-7 RF, Dr Farah    RADIOFREQUENCY ABLATION Left 01/9/2019 10/16/2017 10/7/2020    Left C3-7 RF, Dr Farah    RADIOFREQUENCY ABLATION Left 1/9/2019 10/16/2017    left C3-7 RF, Dr Farah    RADIOFREQUENCY THERMAL COAGULATION OF MEDIAL BRANCH OF POSTERIOR RAMUS OF CERVICAL SPINAL NERVE Left 8/5/2021    Procedure: RADIOFREQUENCY THERMAL COAGULATION, NERVE, SPINAL, CERVICAL, POSTERIOR RAMUS, MEDIAL BRANCH, Left C3-4,4-5  only 2 levels resubmitted dt denied all levels on 8-3-2021;  Surgeon: Angie Farah MD;  [facility]: [facility];  Service: Pain Management;  Laterality: Left;    RADIOFREQUENCY THERMAL COAGULATION OF MEDIAL BRANCH OF POSTERIOR RAMUS OF CERVICAL SPINAL NERVE Right 8/24/2021    Procedure: RADIOFREQUENCY THERMAL COAGULATION, NERVE, SPINAL, CERVICAL, POSTERIOR RAMUS, MEDIAL BRANCH RIGHT C3-C5 RFTC;  Surgeon: Angie Farah MD;  Location: [facility];  Service: Pain Management;  Laterality: Right;  HAD VAC WILL BRING CARD    TRANSFORAMINAL EPIDURAL INJECTION OF STEROID Right 5/30/2018 5/2/2018    Right L4-5 TFESI, Dr Farah       Family History   Problem Relation Age of Onset    Hypertension Mother     Diabetes Mellitus Mother        Social History     Socioeconomic History    Marital status:    Tobacco Use    Smoking status: Every Day     Packs/day: 0.50     Years: 40.00     Pack years: 20.00     Types: Cigarettes    Smokeless tobacco: Never   Substance and Sexual Activity    Alcohol use: Not Currently    Drug use: Never       Current Outpatient Medications   Medication Sig Dispense Refill    amitriptyline (ELAVIL) 25 MG tablet Take 1 tablet (25 mg total) by mouth nightly as needed for Insomnia. 90 tablet 1    ascorbic acid, vitamin C,  (VITAMIN C) 500 MG tablet Take 500 mg by mouth once daily.      blood sugar diagnostic (BLOOD GLUCOSE TEST) Strp Use with glucometer to check blood glucose twice daily for diabetes, E11.9 100 strip 3    blood-glucose meter Misc Use to check blood glucose twice daily for diabetes, E11.9 1 each 0    carvediloL (COREG) 3.125 MG tablet Take 1 tablet (3.125 mg total) by mouth 2 (two) times daily with meals. 90 tablet 1    cholecalciferol, vitamin D3, (VITAMIN D3) 25 mcg (1,000 unit) capsule Take 1,000 Units by mouth once daily.      dulaglutide (TRULICITY) 1.5 mg/0.5 mL pen injector INJECT 1.5 MG INTO THE SKIN EVERY 7 DAYS. 12 pen 1    esomeprazole (NEXIUM) 40 MG capsule Take 1 capsule (40 mg total) by mouth once daily. 90 capsule 1    furosemide (LASIX) 40 MG tablet Take 1 tablet (40 mg total) by mouth once daily. 90 tablet 1    gabapentin (NEURONTIN) 100 MG capsule Take 1 capsule (100 mg total) by mouth every 8 (eight) hours. 270 capsule 2    HYDROcodone-acetaminophen (NORCO) 5-325 mg per tablet Take 1 tablet by mouth every 8 (eight) hours. 90 tablet 0    [START ON 10/25/2022] HYDROcodone-acetaminophen (NORCO) 5-325 mg per tablet Take 1 tablet by mouth every 8 (eight) hours. 90 tablet 0    [START ON 11/24/2022] HYDROcodone-acetaminophen (NORCO) 5-325 mg per tablet Take 1 tablet by mouth every 8 (eight) hours. 90 tablet 0    icosapent ethyL (VASCEPA) 0.5 gram Cap Take 1 capsule by mouth 2 (two) times a day. 180 capsule 1    insulin degludec (TRESIBA FLEXTOUCH U-100) 100 unit/mL (3 mL) insulin pen Inject 10 units into skin daily 4 pen 1    lancets 33 gauge Misc Use to check blood glucose twice daily for diabetes, E11.9 100 each 3    levothyroxine (SYNTHROID) 50 MCG tablet Take 1 tablet (50 mcg total) by mouth before breakfast. 30 tablet 1    lisinopriL (PRINIVIL,ZESTRIL) 5 MG tablet Take 1 tablet (5 mg total) by mouth once daily. 90 tablet 1    methocarbamoL (ROBAXIN) 500 MG Tab Take 1 tablet (500 mg  total) by mouth 2 (two) times daily. 180 tablet 1    montelukast (SINGULAIR) 10 mg tablet Take 1 tablet (10 mg total) by mouth every evening. 90 tablet 1    nitroGLYCERIN (NITROSTAT) 0.4 MG SL tablet Place 1 tablet (0.4 mg total) under the tongue every 5 (five) minutes as needed for Chest pain. 30 tablet 2    rosuvastatin (CRESTOR) 40 MG Tab Take 1 tablet (40 mg total) by mouth every evening. 90 tablet 1    sertraline (ZOLOFT) 50 MG tablet Take 1 tablet (50 mg total) by mouth once daily. 90 tablet 1    spironolactone (ALDACTONE) 25 MG tablet Take 1 tablet (25 mg total) by mouth once daily. 90 tablet 1     No current facility-administered medications for this encounter.       Review of patient's allergies indicates:  No Known Allergies    Pre-op Assessment    I have reviewed the Patient Summary Reports.     I have reviewed the Nursing Notes. I have reviewed the NPO Status.   I have reviewed the Medications.     Review of Systems  Anesthesia Hx:  No problems with previous Anesthesia  Denies Family Hx of Anesthesia complications.   Denies Personal Hx of Anesthesia complications.   Hematology/Oncology:     Oncology Normal     EENT/Dental:EENT/Dental Normal   Cardiovascular:   CAD   CHF hyperlipidemia    Pulmonary:   COPD    Renal/:   Chronic Renal Disease    Hepatic/GI:   Hiatal Hernia, GERD    Musculoskeletal:   Arthritis     Neurological:   Neuromuscular Disease,   Chronic Pain Syndrome   Endocrine:   Diabetes, type 2 Hypothyroidism    Dermatological:  Skin Normal    Psych:   Psychiatric History depression          Physical Exam  General: Well nourished, Alert, Oriented and Cooperative    Airway:  Mouth Opening: Normal  Neck ROM: Normal ROM    Chest/Lungs:  Normal Respiratory Rate    Heart:  Rate: Normal        Anesthesia Plan  Type of Anesthesia, risks & benefits discussed:    Anesthesia Type: Gen Natural Airway, MAC  Intra-op Monitoring Plan: Standard ASA Monitors  Post Op Pain Control Plan: multimodal  analgesia and IV/PO Opioids PRN  Induction:  IV  Informed Consent: Informed consent signed with the Patient and all parties understand the risks and agree with anesthesia plan.  All questions answered. Patient consented to blood products? Yes  ASA Score: 3  Day of Surgery Review of History & Physical: I have interviewed and examined the patient. I have reviewed the patient's H&P dated: There are no significant changes.     Ready For Surgery From Anesthesia Perspective.     .

## 2022-09-28 NOTE — ANESTHESIA POSTPROCEDURE EVALUATION
Anesthesia Post Evaluation    Patient: Sharee Elizondo    Procedure(s) Performed: * No procedures listed *    Final Anesthesia Type: general      Patient location during evaluation: GI PACU  Patient participation: Yes- Able to Participate  Level of consciousness: awake and alert  Post-procedure vital signs: reviewed and stable  Pain management: adequate  Airway patency: patent    PONV status at discharge: No PONV  Anesthetic complications: no      Cardiovascular status: blood pressure returned to baseline and hemodynamically stable  Respiratory status: spontaneous ventilation  Hydration status: euvolemic  Follow-up not needed.  Comments: Pt voices appreciation for care          Vitals Value Taken Time   /61 09/28/22 1450   Temp 97 F 09/28/22 1503   Pulse 80 09/28/22 1450   Resp 20 09/28/22 1450   SpO2 98 % 09/28/22 1450         Event Time   Out of Recovery 14:53:39         Pain/Christiano Score: Christiano Score: 8 (9/28/2022  2:19 PM)

## 2022-09-29 LAB
ESTROGEN SERPL-MCNC: NORMAL PG/ML
INSULIN SERPL-ACNC: NORMAL U[IU]/ML
LAB AP GROSS DESCRIPTION: NORMAL
LAB AP LABORATORY NOTES: NORMAL
T3RU NFR SERPL: NORMAL %

## 2022-09-30 ENCOUNTER — TELEPHONE (OUTPATIENT)
Dept: GASTROENTEROLOGY | Facility: CLINIC | Age: 82
End: 2022-09-30
Payer: MEDICARE

## 2022-09-30 NOTE — TELEPHONE ENCOUNTER
Called patient to discuss results and verbalized understanding.      ----- Message from Michael Long MD sent at 9/29/2022  4:30 PM CDT -----  EGD bx confirmed gastritis without H.pylori.  ----- Message -----  From: Roxana Morales RN  Sent: 9/28/2022   1:02 PM CDT  To: Michael Long MD

## 2022-10-17 ENCOUNTER — OFFICE VISIT (OUTPATIENT)
Dept: PAIN MEDICINE | Facility: CLINIC | Age: 82
End: 2022-10-17
Payer: MEDICARE

## 2022-10-17 VITALS
OXYGEN SATURATION: 98 % | HEIGHT: 63 IN | RESPIRATION RATE: 18 BRPM | BODY MASS INDEX: 25.16 KG/M2 | WEIGHT: 142 LBS | HEART RATE: 94 BPM | SYSTOLIC BLOOD PRESSURE: 104 MMHG | DIASTOLIC BLOOD PRESSURE: 59 MMHG

## 2022-10-17 DIAGNOSIS — G89.29 CHRONIC PAIN OF BOTH KNEES: Primary | Chronic | ICD-10-CM

## 2022-10-17 DIAGNOSIS — M25.562 CHRONIC PAIN OF BOTH KNEES: Primary | Chronic | ICD-10-CM

## 2022-10-17 DIAGNOSIS — M25.561 CHRONIC PAIN OF BOTH KNEES: Primary | Chronic | ICD-10-CM

## 2022-10-17 DIAGNOSIS — M47.817 LUMBOSACRAL SPONDYLOSIS WITHOUT MYELOPATHY: Chronic | ICD-10-CM

## 2022-10-17 PROCEDURE — 1159F MED LIST DOCD IN RCRD: CPT | Mod: CPTII,,, | Performed by: PHYSICIAN ASSISTANT

## 2022-10-17 PROCEDURE — 3078F PR MOST RECENT DIASTOLIC BLOOD PRESSURE < 80 MM HG: ICD-10-PCS | Mod: CPTII,,, | Performed by: PHYSICIAN ASSISTANT

## 2022-10-17 PROCEDURE — 3288F FALL RISK ASSESSMENT DOCD: CPT | Mod: CPTII,,, | Performed by: PHYSICIAN ASSISTANT

## 2022-10-17 PROCEDURE — 99213 PR OFFICE/OUTPT VISIT, EST, LEVL III, 20-29 MIN: ICD-10-PCS | Mod: S$PBB,,, | Performed by: PHYSICIAN ASSISTANT

## 2022-10-17 PROCEDURE — 3074F SYST BP LT 130 MM HG: CPT | Mod: CPTII,,, | Performed by: PHYSICIAN ASSISTANT

## 2022-10-17 PROCEDURE — 3288F PR FALLS RISK ASSESSMENT DOCUMENTED: ICD-10-PCS | Mod: CPTII,,, | Performed by: PHYSICIAN ASSISTANT

## 2022-10-17 PROCEDURE — 3078F DIAST BP <80 MM HG: CPT | Mod: CPTII,,, | Performed by: PHYSICIAN ASSISTANT

## 2022-10-17 PROCEDURE — 1125F PR PAIN SEVERITY QUANTIFIED, PAIN PRESENT: ICD-10-PCS | Mod: CPTII,,, | Performed by: PHYSICIAN ASSISTANT

## 2022-10-17 PROCEDURE — 1101F PR PT FALLS ASSESS DOC 0-1 FALLS W/OUT INJ PAST YR: ICD-10-PCS | Mod: CPTII,,, | Performed by: PHYSICIAN ASSISTANT

## 2022-10-17 PROCEDURE — 1125F AMNT PAIN NOTED PAIN PRSNT: CPT | Mod: CPTII,,, | Performed by: PHYSICIAN ASSISTANT

## 2022-10-17 PROCEDURE — 3074F PR MOST RECENT SYSTOLIC BLOOD PRESSURE < 130 MM HG: ICD-10-PCS | Mod: CPTII,,, | Performed by: PHYSICIAN ASSISTANT

## 2022-10-17 PROCEDURE — 1159F PR MEDICATION LIST DOCUMENTED IN MEDICAL RECORD: ICD-10-PCS | Mod: CPTII,,, | Performed by: PHYSICIAN ASSISTANT

## 2022-10-17 PROCEDURE — 99215 OFFICE O/P EST HI 40 MIN: CPT | Mod: PBBFAC | Performed by: PHYSICIAN ASSISTANT

## 2022-10-17 PROCEDURE — 99213 OFFICE O/P EST LOW 20 MIN: CPT | Mod: S$PBB,,, | Performed by: PHYSICIAN ASSISTANT

## 2022-10-17 PROCEDURE — 1101F PT FALLS ASSESS-DOCD LE1/YR: CPT | Mod: CPTII,,, | Performed by: PHYSICIAN ASSISTANT

## 2022-10-17 NOTE — PATIENT INSTRUCTIONS
Bilateral GNB scheduled for 11/8/22 @ 0940am      ALL PATIENTS TO HAVE COVID TESTING TO BE DONE 48-72 HOURS PRIOR TO PROCEDURE IF PT HAS NOT RECEIVED BOTH COVID VACCINATIONS OR HAS BEEN VACCINATED WITHIN THE LAST 2 WEEKS.     IF PATIENT HAD BOTH VACCINES AT GREATER THAN  TWO WEEKS PRIOR TO PROCEDURE , PT DOES NOT HAVE TO HAVE COVID TESTING, VACCINATION CARD MUST BE PROVIDED  OR   PT MUST HAVE COVID TESTING IN ORDER TO HAVE PROCEDURE.     If you have not had the covid vaccine and have tested positive in a clinical setting 60 days prior to procedure, you do not have to be tested for covid.    IF YOUR  PROCEDURE IS ON A Tuesday, GET COVID TESTING ON THE  Friday BEFORE PROCEDURE.    IF YOUR PROCEDURE IS ON Thursday, HAVE COVID TESTING ON THE Monday OR Tuesday PRIOR TO PROCEDURE  IF YOUR PROCEDURE IS ON A Friday, GET COVID TESTING ON THE Tuesday  OR Wednesday PRIOR TO PROCEDURE      COVID TESTING TO BE DONE AT [facility] IN THE LAB DEPARTMENT OR YOUR PRIMARY CARE DOCTOR MAY ORDER IT FOR YOU.IF YOUR PRIMARY  CARE DOCTOR ORDERS YOUR COVID TESTING YOU MUST BRING YOUR RESULTS WITH YOU TO YOUR PROCEDURE.     HOME COVID TESTING ARE NOT ALLOWED TO BE USED FOR TESTING FOR PROCEDURE.      Procedure Instructions:    Nothing to eat or drink for 8 hours or after midnight including gum, candy, mints, or tobacco products.  If you are scheduled for 1:30 or later nothing to eat or drink after 5 a.m. the morning of the procedure, including gum, candy, mints, or tobacco products.  Must have a  at least 18 yrs of age to stay present at all times  No Diabetic medications the morning of procedure, check blood sugar the morning of procedure, if it is greater than 200 call the office at [phone]  If you are started on antibiotics or have been prescribed antibiotics, have a fever, or have any other type of infection call to reschedule procedure.  If you take blood pressure medications you can take it at your regular scheduled  time with a small sip of WATER!    HOLD ASPIRIN AND ASPIRIN PRODUCTS  (ASPIRIN, BC POWDER ETC. ) FOR 7 DAYS  PRIOR TO PROCEDURE  HOLD NSAIDS( ibuprofen, mobic, meloxicam, advil, diclofenac, naproxen, relafen, celebrex,  methotrexate, aleve etc....)  FOR 3 DAYS   PRIOR TO PROCEDURE

## 2022-10-17 NOTE — PROGRESS NOTES
Subjective:         Patient ID: Sharee Elizondo is a 82 y.o. female.    Chief Complaint: Back Pain, Joint Pain, and Knee Pain      Pain  This is a chronic problem. The current episode started more than 1 year ago. The problem occurs daily. The problem has been waxing and waning. Associated symptoms include arthralgias and neck pain. Pertinent negatives include no anorexia, coughing, fever, sore throat, urinary symptoms or vertigo.   Review of Systems   Constitutional:  Negative for fever.   HENT:  Negative for drooling, ear pain, facial swelling, mouth dryness, nosebleeds, sore throat, trouble swallowing, voice change and goiter.    Respiratory:  Negative for apnea, cough, choking, chest tightness, shortness of breath, wheezing and stridor.    Gastrointestinal:  Negative for anorexia and fecal incontinence.   Musculoskeletal:  Positive for arthralgias and neck pain.   Neurological:  Negative for vertigo and memory loss.         Past Medical History:   Diagnosis Date    Acute superficial gastritis without hemorrhage 2/18/2022    Atrial fibrillation     Automatic implantable cardiac defibrillator in situ     Bilateral primary osteoarthritis of knee     Cervical spondylosis without myelopathy     CHF (congestive heart failure)     Chronic ischemic heart disease     Chronic kidney disease (CKD), stage III (moderate)     Chronic pain syndrome     COPD (chronic obstructive pulmonary disease)     Coronary arteriosclerosis     Depressive disorder     Esophageal dysphagia 2/18/2022    GERD (gastroesophageal reflux disease)     HH (hiatus hernia) 2/18/2022    Hypothyroidism     Low back pain     Lumbar radiculopathy     Melanoma 2019    scalp    Metabolic syndrome     Myocardial ischemia     Other long term (current) drug therapy     Radiculopathy, cervical region     Type 2 diabetes mellitus      Past Surgical History:   Procedure Laterality Date    BACK SURGERY      bilateral knee injection/ aspiration of joint/ bursa-large   Bilateral 4/4/2017 3/27/2017 3/20/2017    bilateral knee orthovisc injection    CARDIAC SURGERY      EPIDURAL STEROID INJECTION INTO CERVICAL SPINE  6/19/2019 5/17/2017 4/26/2017    C6-7 LONNIE, Dr Farah    EPIDURAL STEROID INJECTION INTO LUMBAR SPINE  03/15/2018    L4-5 LONNIE, Dr Farah    HYSTERECTOMY      INJECTION OF ANESTHETIC AGENT AROUND MEDIAL BRANCH NERVES INNERVATING LUMBAR FACET JOINT Bilateral 4/14/2022    Procedure: Block-nerve-medial branch-lumbar, bilateral L4 through S1;  Surgeon: Angie Farah MD;  Location: [facility];  Service: Pain Management;  Laterality: Bilateral;  pt aware at visit to be tested  4/12 patient getting covid test today    INJECTION OF FACET JOINT Bilateral 8/17/2017 7/19/2017    Bilateral C3-7 FI, Dr Farah    INJECTION OF FACET JOINT Left 10/18/2018    left C3-7 FI, Dr Farah    INJECTION OF FACET JOINT Bilateral 8/22/2018 6/29/2018    bilateral L3-S1 FI, Dr Farah    RADIOFREQUENCY ABLATION Right 12/12/2018    Right C3-7 RF, Dr Farah    RADIOFREQUENCY ABLATION Left 01/9/2019 10/16/2017 10/7/2020    Left C3-7 RF, Dr Farah    RADIOFREQUENCY ABLATION Left 1/9/2019 10/16/2017    left C3-7 RF, Dr Farah    RADIOFREQUENCY THERMAL COAGULATION OF MEDIAL BRANCH OF POSTERIOR RAMUS OF CERVICAL SPINAL NERVE Left 8/5/2021    Procedure: RADIOFREQUENCY THERMAL COAGULATION, NERVE, SPINAL, CERVICAL, POSTERIOR RAMUS, MEDIAL BRANCH, Left C3-4,4-5  only 2 levels resubmitted dt denied all levels on 8-3-2021;  Surgeon: Angie Farah MD;  Location: [facility];  Service: Pain Management;  Laterality: Left;    RADIOFREQUENCY THERMAL COAGULATION OF MEDIAL BRANCH OF POSTERIOR RAMUS OF CERVICAL SPINAL NERVE Right 8/24/2021    Procedure: RADIOFREQUENCY THERMAL COAGULATION, NERVE, SPINAL, CERVICAL, POSTERIOR RAMUS, MEDIAL BRANCH RIGHT C3-C5 RFTC;  Surgeon: Angie Farah MD;  Location: [facility];  Service: Pain Management;  Laterality: Right;  HAD VAC WILL BRING CARD     "TRANSFORAMINAL EPIDURAL INJECTION OF STEROID Right 5/30/2018 5/2/2018    Right L4-5 TFESI, Dr Farah     Social History     Socioeconomic History    Marital status:    Tobacco Use    Smoking status: Every Day     Packs/day: 0.50     Years: 40.00     Pack years: 20.00     Types: Cigarettes    Smokeless tobacco: Never   Substance and Sexual Activity    Alcohol use: Not Currently    Drug use: Never     Family History   Problem Relation Age of Onset    Hypertension Mother     Diabetes Mellitus Mother      Review of patient's allergies indicates:  No Known Allergies     Objective:  Vitals:    10/17/22 0904   BP: (!) 104/59   Pulse: 94   Resp: 18   SpO2: 98%   Weight: 64.4 kg (142 lb)   Height: 5' 3" (1.6 m)   PainSc:   4         Physical Exam  Vitals and nursing note reviewed. Exam conducted with a chaperone present.   Constitutional:       General: She is awake. She is not in acute distress.     Appearance: Normal appearance. She is not ill-appearing or toxic-appearing.   HENT:      Head: Normocephalic and atraumatic.      Nose: Nose normal.      Mouth/Throat:      Mouth: Mucous membranes are moist.      Pharynx: Oropharynx is clear.   Eyes:      Conjunctiva/sclera: Conjunctivae normal.      Pupils: Pupils are equal, round, and reactive to light.   Pulmonary:      Effort: Pulmonary effort is normal. No respiratory distress.   Abdominal:      Palpations: Abdomen is soft.      Tenderness: There is no guarding.   Musculoskeletal:         General: Normal range of motion.      Cervical back: Normal range of motion and neck supple. Tenderness present. No rigidity.      Thoracic back: Tenderness present.      Lumbar back: Tenderness present.   Skin:     General: Skin is warm and dry.      Coloration: Skin is not jaundiced or pale.   Neurological:      General: No focal deficit present.      Mental Status: She is alert and oriented to person, place, and time. Mental status is at baseline.      Cranial Nerves: No cranial " nerve deficit (II-XII).      Gait: Gait abnormal.   Psychiatric:         Mood and Affect: Mood normal.         Behavior: Behavior normal. Behavior is cooperative.         Thought Content: Thought content normal.         EGD w Dilation  Narrative: Table formatting from the original result was not included.  Procedure Date  9/28/22    Impression  Overall   Impression:  Mucosa of the esophagus is grossly normal. The   esophagus was dilated with a 48F Bejarano. The stomach has erythema and   biopsies were obtained. The pyloric channel is patent. Mucosa of the   duodenum is normal.    Recommendation   Await pathology results     Avoid nsaids; ppi 1/AM; repeat dilation prn.    Indication  Dysphagia, unspecified type    Providers  Kelsey Pereira Technician   Venita Lacy, JAX Technician   Michael Long MD Proceduralist   Cristian Max CRNA CRNA     Medications  Moderate sedation administered by anesthesia staff - See anesthesia   record.    Preprocedure  A history and physical has been performed, and patient medication   allergies have been reviewed. The patient's tolerance of previous   anesthesia has been reviewed. The risks and benefits of the procedure and   the sedation options and risks were discussed with the patient. All   questions were answered and informed consent obtained.    ASA Score: ASA 2 - Patient with mild systemic disease with no functional   limitations  Mallampati Airway Score: I (soft palate, uvula, fauces, and tonsillar   pillars visible)    Details of the Procedure  The patient underwent monitored anesthesia care, which was administered by   an anesthesia professional. The patient's blood pressure, heart rate,   level of consciousness, oxygen, respirations, ECG and ETCO2 were monitored   throughout the procedure. The scope was introduced through the mouth and   advanced to the third part of the duodenum. Retroflexion was performed in   the cardia. Prior to the procedure, the patient's H.  Pylori status was   unknown. The patient's estimated blood loss was minimal (<5 mL). The   procedure was not difficult. The patient tolerated the procedure well.   There were no apparent complications.     Scope: Gastroscope  Scope Serial: [id number]    Events  Procedure Events   Event Event Time     Procedure Events   Event Event Time   SCOPE IN 9/28/2022  2:11 PM   SCOPE OUT 9/28/2022  2:14 PM     Findings  Performed forceps biopsies in the stomach  Erythematous mucosa in the fundus of the stomach and antrum; performed   cold forceps biopsy  Dilated in the esophagus with Bejarano dilator       Hospital Outpatient Visit on 09/28/2022   Component Date Value Ref Range Status    POC Glucose 09/28/2022 117 (A)  70 - 110 MG/DL Final    POC Glucose 09/28/2022 119 (H)  70 - 105 mg/dL Final    Case Report 09/28/2022    Final                    Value:Surgical Pathology                                Case: [id number]                                   Authorizing Provider:  Michael Long MD      Collected:           09/28/2022 02:13 PM          Ordering Location:     [facility] - Endoscopy       Received:            09/28/2022 02:57 PM          Pathologist:           Rene Alegre MD                                                            Specimen:    Stomach, A- Stomach Bx                                                                     Final Diagnosis 09/28/2022    Final                    Value:This result contains rich text formatting which cannot be displayed here.    Gross Description 09/28/2022    Final                    Value:This result contains rich text formatting which cannot be displayed here.    Microscopic Description 09/28/2022    Final                    Value:This result contains rich text formatting which cannot be displayed here.    Laboratory Notes 09/28/2022    Final                    Value:This result contains rich text formatting which cannot be displayed here.   Hospital Outpatient Visit on  08/30/2022   Component Date Value Ref Range Status    PT 08/30/2022 12.8  11.7 - 14.7 seconds Final    INR 08/30/2022 1.00  <=3.30 Final    PTT 08/30/2022 28.8  25.2 - 37.3 seconds Final    WBC 08/30/2022 9.79  4.50 - 11.00 K/uL Final    RBC 08/30/2022 3.87 (L)  4.20 - 5.40 M/uL Final    Hemoglobin 08/30/2022 11.9 (L)  12.0 - 16.0 g/dL Final    Hematocrit 08/30/2022 36.2 (L)  38.0 - 47.0 % Final    MCV 08/30/2022 93.5  80.0 - 96.0 fL Final    MCH 08/30/2022 30.7  27.0 - 31.0 pg Final    MCHC 08/30/2022 32.9  32.0 - 36.0 g/dL Final    RDW 08/30/2022 12.3  11.5 - 14.5 % Final    Platelet Count 08/30/2022 219  150 - 400 K/uL Final    MPV 08/30/2022 10.5  9.4 - 12.4 fL Final    Neutrophils % 08/30/2022 59.3  53.0 - 65.0 % Final    Lymphocytes % 08/30/2022 29.7  27.0 - 41.0 % Final    Monocytes % 08/30/2022 6.3 (H)  2.0 - 6.0 % Final    Eosinophils % 08/30/2022 3.7  1.0 - 4.0 % Final    Basophils % 08/30/2022 0.7  0.0 - 1.0 % Final    Immature Granulocytes % 08/30/2022 0.3  0.0 - 0.4 % Final    nRBC, Auto 08/30/2022 0.0  <=0.0 % Final    Neutrophils, Abs 08/30/2022 5.80  1.80 - 7.70 K/uL Final    Lymphocytes, Absolute 08/30/2022 2.91  1.00 - 4.80 K/uL Final    Monocytes, Absolute 08/30/2022 0.62  0.00 - 0.80 K/uL Final    Eosinophils, Absolute 08/30/2022 0.36  0.00 - 0.50 K/uL Final    Basophils, Absolute 08/30/2022 0.07  0.00 - 0.20 K/uL Final    Immature Granulocytes, Absolute 08/30/2022 0.03  0.00 - 0.04 K/uL Final    nRBC, Absolute 08/30/2022 0.00  <=0.00 x10e3/uL Final    Diff Type 08/30/2022 Auto   Final   Lab Visit on 08/23/2022   Component Date Value Ref Range Status    TSH 08/23/2022 5.360 (H)  0.358 - 3.740 uIU/mL Final    Free T3 08/23/2022 2.02 (L)  2.18 - 3.98 pg/mL Final   Office Visit on 08/17/2022   Component Date Value Ref Range Status    POC Amphetamines 08/17/2022 Negative  Negative, Inconclusive Final    POC Barbiturates 08/17/2022 Negative  Negative, Inconclusive Final    POC Benzodiazepines  08/17/2022 Negative  Negative, Inconclusive Final    POC Cocaine 08/17/2022 Negative  Negative, Inconclusive Final    POC THC 08/17/2022 Negative  Negative, Inconclusive Final    POC Methadone 08/17/2022 Negative  Negative, Inconclusive Final    POC Methamphetamine 08/17/2022 Negative  Negative, Inconclusive Final    POC Opiates 08/17/2022 Presumptive Positive (A)  Negative, Inconclusive Final    POC Oxycodone 08/17/2022 Negative  Negative, Inconclusive Final    POC Phencyclidine 08/17/2022 Negative  Negative, Inconclusive Final    POC Methylenedioxymethamphetamine * 08/17/2022 Negative  Negative, Inconclusive Final    POC Tricyclic Antidepressants 08/17/2022 Negative  Negative, Inconclusive Final    POC Buprenorphine 08/17/2022 Negative   Final     Acceptable 08/17/2022 Yes   Final    POC Temperature (Urine) 08/17/2022 92   Final   Office Visit on 07/26/2022   Component Date Value Ref Range Status    POC Amphetamines 07/26/2022 Negative  Negative, Inconclusive Final    POC Barbiturates 07/26/2022 Negative  Negative, Inconclusive Final    POC Benzodiazepines 07/26/2022 Negative  Negative, Inconclusive Final    POC Cocaine 07/26/2022 Negative  Negative, Inconclusive Final    POC THC 07/26/2022 Negative  Negative, Inconclusive Final    POC Methadone 07/26/2022 Negative  Negative, Inconclusive Final    POC Methamphetamine 07/26/2022 Negative  Negative, Inconclusive Final    POC Opiates 07/26/2022 Presumptive Positive (A)  Negative, Inconclusive Final    POC Oxycodone 07/26/2022 Negative  Negative, Inconclusive Final    POC Phencyclidine 07/26/2022 Negative  Negative, Inconclusive Final    POC Methylenedioxymethamphetamine * 07/26/2022 Negative  Negative, Inconclusive Final    POC Tricyclic Antidepressants 07/26/2022 Negative  Negative, Inconclusive Final    POC Buprenorphine 07/26/2022 Negative   Final     Acceptable 07/26/2022 Yes   Final    POC Temperature (Urine) 07/26/2022 94   Final    Lab Visit on 07/21/2022   Component Date Value Ref Range Status    Sodium 07/21/2022 137  136 - 145 mmol/L Final    Potassium 07/21/2022 3.9  3.5 - 5.1 mmol/L Final    Chloride 07/21/2022 101  98 - 107 mmol/L Final    CO2 07/21/2022 26  21 - 32 mmol/L Final    Anion Gap 07/21/2022 14  7 - 16 mmol/L Final    Glucose 07/21/2022 105  74 - 106 mg/dL Final    BUN 07/21/2022 20 (H)  7 - 18 mg/dL Final    Creatinine 07/21/2022 1.18 (H)  0.55 - 1.02 mg/dL Final    BUN/Creatinine Ratio 07/21/2022 17  6 - 20 Final    Calcium 07/21/2022 9.7  8.5 - 10.1 mg/dL Final    Total Protein 07/21/2022 7.4  6.4 - 8.2 g/dL Final    Albumin 07/21/2022 4.0  3.5 - 5.0 g/dL Final    Globulin 07/21/2022 3.4  2.0 - 4.0 g/dL Final    A/G Ratio 07/21/2022 1.2   Final    Bilirubin, Total 07/21/2022 0.4  0.0 - 1.2 mg/dL Final    Alk Phos 07/21/2022 100  55 - 142 U/L Final    ALT 07/21/2022 15  13 - 56 U/L Final    AST 07/21/2022 11 (L)  15 - 37 U/L Final    eGFR 07/21/2022 47 (L)  >=60 mL/min/1.73m² Final    Triglycerides 07/21/2022 223 (H)  35 - 150 mg/dL Final    Cholesterol 07/21/2022 136  0 - 200 mg/dL Final    HDL Cholesterol 07/21/2022 39 (L)  40 - 60 mg/dL Final    Cholesterol/HDL Ratio (Risk Factor) 07/21/2022 3.5   Final    Non-HDL 07/21/2022 97  mg/dL Final    LDL Calculated 07/21/2022 52  mg/dL Final    LDL/HDL 07/21/2022 1.3   Final    VLDL 07/21/2022 45  mg/dL Final    Hemoglobin A1C 07/21/2022 6.5  4.5 - 6.6 % Final    Estimated Average Glucose 07/21/2022 130  mg/dL Final    TSH 07/21/2022 13.400 (H)  0.358 - 3.740 uIU/mL Final    WBC 07/21/2022 8.56  4.50 - 11.00 K/uL Final    RBC 07/21/2022 5.02  4.20 - 5.40 M/uL Final    Hemoglobin 07/21/2022 15.7  12.0 - 16.0 g/dL Final    Hematocrit 07/21/2022 45.9  38.0 - 47.0 % Final    MCV 07/21/2022 91.4  80.0 - 96.0 fL Final    MCH 07/21/2022 31.3 (H)  27.0 - 31.0 pg Final    MCHC 07/21/2022 34.2  32.0 - 36.0 g/dL Final    RDW 07/21/2022 13.0  11.5 - 14.5 % Final    Platelet Count  07/21/2022 243  150 - 400 K/uL Final    MPV 07/21/2022 11.1  9.4 - 12.4 fL Final    Neutrophils % 07/21/2022 43.2 (L)  53.0 - 65.0 % Final    Lymphocytes % 07/21/2022 45.8 (H)  27.0 - 41.0 % Final    Monocytes % 07/21/2022 6.2 (H)  2.0 - 6.0 % Final    Eosinophils % 07/21/2022 3.6  1.0 - 4.0 % Final    Basophils % 07/21/2022 1.1 (H)  0.0 - 1.0 % Final    Immature Granulocytes % 07/21/2022 0.1  0.0 - 0.4 % Final    nRBC, Auto 07/21/2022 0.0  <=0.0 % Final    Neutrophils, Abs 07/21/2022 3.70  1.80 - 7.70 K/uL Final    Lymphocytes, Absolute 07/21/2022 3.92  1.00 - 4.80 K/uL Final    Monocytes, Absolute 07/21/2022 0.53  0.00 - 0.80 K/uL Final    Eosinophils, Absolute 07/21/2022 0.31  0.00 - 0.50 K/uL Final    Basophils, Absolute 07/21/2022 0.09  0.00 - 0.20 K/uL Final    Immature Granulocytes, Absolute 07/21/2022 0.01  0.00 - 0.04 K/uL Final    nRBC, Absolute 07/21/2022 0.00  <=0.00 x10e3/uL Final    Diff Type 07/21/2022 Auto   Final         Orders Placed This Encounter   Procedures    Case Request Operating Room: Block, Nerve, Genicular,     Order Specific Question:   Medical Necessity:     Answer:   Medically Non-Urgent [100]     Order Specific Question:   CPT Code:     Answer:   OR NERVE BLOCK INJ, ANES/STEROID, OTHER PERIPHERAL [76021]     Order Specific Question:   Is an on-site pathologist required for this procedure?     Answer:   N/A         Requested Prescriptions      No prescriptions requested or ordered in this encounter       Assessment:     1. Chronic pain of both knees    2. Lumbosacral spondylosis without myelopathy         A's of Opioid Risk Assessment  Activity:Patient can perform ADL.   Analgesia:Patients pain is partially controlled by current medication. Patient has tried OTC medications such as Tylenol and Ibuprofen with out relief.   Adverse Effects: Patient denies constipation or sedation.  Aberrant Behavior:  reviewed with no aberrant drug seeking/taking behavior.  Overdose reversal drug  naloxone discussed    Drug screen reviewed      Plan:    Cardiac defibrillator cannot have MRI    History lumbar surgery 2005, Dr. Woods [facility]    She states her left knee pain improved after injection last visit     Requesting genicular nerve block bilateral     Chronic bilateral knee pain     X-ray bilateral knee [facility] September 4, 2020 degenerative changes noted no fracture noted    I have given the patient medically directed home exercise program    Monitor anesthesia request is medically indicated for the scheduled nerve block procedure due to:  1- needle phobia and anxiety, placing  the patient at risk during the provided service.  2-patient has an ASA class greater than 3 and requires constant presence of an anesthesiologist during the procedure,   3-patient has severe problems hard to lie still  4-patient suffers from chronic pain and is unable to function due to  diminished ADLs    Schedule bilateral genicular nerve block # 1, bilateral knee pain    Dr. Farah    Bring original prescription medication bottles/container/box with labels to each visit    Pill count    Physical therapy    Massage therapy declines

## 2022-10-17 NOTE — H&P (VIEW-ONLY)
Subjective:         Patient ID: Sharee Elizondo is a 82 y.o. female.    Chief Complaint: Back Pain, Joint Pain, and Knee Pain      Pain  This is a chronic problem. The current episode started more than 1 year ago. The problem occurs daily. The problem has been waxing and waning. Associated symptoms include arthralgias and neck pain. Pertinent negatives include no anorexia, coughing, fever, sore throat, urinary symptoms or vertigo.   Review of Systems   Constitutional:  Negative for fever.   HENT:  Negative for drooling, ear pain, facial swelling, mouth dryness, nosebleeds, sore throat, trouble swallowing, voice change and goiter.    Respiratory:  Negative for apnea, cough, choking, chest tightness, shortness of breath, wheezing and stridor.    Gastrointestinal:  Negative for anorexia and fecal incontinence.   Musculoskeletal:  Positive for arthralgias and neck pain.   Neurological:  Negative for vertigo and memory loss.         Past Medical History:   Diagnosis Date    Acute superficial gastritis without hemorrhage 2/18/2022    Atrial fibrillation     Automatic implantable cardiac defibrillator in situ     Bilateral primary osteoarthritis of knee     Cervical spondylosis without myelopathy     CHF (congestive heart failure)     Chronic ischemic heart disease     Chronic kidney disease (CKD), stage III (moderate)     Chronic pain syndrome     COPD (chronic obstructive pulmonary disease)     Coronary arteriosclerosis     Depressive disorder     Esophageal dysphagia 2/18/2022    GERD (gastroesophageal reflux disease)     HH (hiatus hernia) 2/18/2022    Hypothyroidism     Low back pain     Lumbar radiculopathy     Melanoma 2019    scalp    Metabolic syndrome     Myocardial ischemia     Other long term (current) drug therapy     Radiculopathy, cervical region     Type 2 diabetes mellitus      Past Surgical History:   Procedure Laterality Date    BACK SURGERY      bilateral knee injection/ aspiration of joint/ bursa-large   Bilateral 4/4/2017 3/27/2017 3/20/2017    bilateral knee orthovisc injection    CARDIAC SURGERY      EPIDURAL STEROID INJECTION INTO CERVICAL SPINE  6/19/2019 5/17/2017 4/26/2017    C6-7 LONNIE, Dr Farah    EPIDURAL STEROID INJECTION INTO LUMBAR SPINE  03/15/2018    L4-5 LONNIE, Dr Farah    HYSTERECTOMY      INJECTION OF ANESTHETIC AGENT AROUND MEDIAL BRANCH NERVES INNERVATING LUMBAR FACET JOINT Bilateral 4/14/2022    Procedure: Block-nerve-medial branch-lumbar, bilateral L4 through S1;  Surgeon: Angie Farah MD;  Location: [facility];  Service: Pain Management;  Laterality: Bilateral;  pt aware at visit to be tested  4/12 patient getting covid test today    INJECTION OF FACET JOINT Bilateral 8/17/2017 7/19/2017    Bilateral C3-7 FI, Dr Farah    INJECTION OF FACET JOINT Left 10/18/2018    left C3-7 FI, Dr Farah    INJECTION OF FACET JOINT Bilateral 8/22/2018 6/29/2018    bilateral L3-S1 FI, Dr Farah    RADIOFREQUENCY ABLATION Right 12/12/2018    Right C3-7 RF, Dr Farah    RADIOFREQUENCY ABLATION Left 01/9/2019 10/16/2017 10/7/2020    Left C3-7 RF, Dr Farah    RADIOFREQUENCY ABLATION Left 1/9/2019 10/16/2017    left C3-7 RF, Dr Farah    RADIOFREQUENCY THERMAL COAGULATION OF MEDIAL BRANCH OF POSTERIOR RAMUS OF CERVICAL SPINAL NERVE Left 8/5/2021    Procedure: RADIOFREQUENCY THERMAL COAGULATION, NERVE, SPINAL, CERVICAL, POSTERIOR RAMUS, MEDIAL BRANCH, Left C3-4,4-5  only 2 levels resubmitted dt denied all levels on 8-3-2021;  Surgeon: Angie Farah MD;  Location: [facility];  Service: Pain Management;  Laterality: Left;    RADIOFREQUENCY THERMAL COAGULATION OF MEDIAL BRANCH OF POSTERIOR RAMUS OF CERVICAL SPINAL NERVE Right 8/24/2021    Procedure: RADIOFREQUENCY THERMAL COAGULATION, NERVE, SPINAL, CERVICAL, POSTERIOR RAMUS, MEDIAL BRANCH RIGHT C3-C5 RFTC;  Surgeon: Angie Farah MD;  Location: [facility];  Service: Pain Management;  Laterality: Right;  HAD VAC WILL BRING CARD     "TRANSFORAMINAL EPIDURAL INJECTION OF STEROID Right 5/30/2018 5/2/2018    Right L4-5 TFESI, Dr Farah     Social History     Socioeconomic History    Marital status:    Tobacco Use    Smoking status: Every Day     Packs/day: 0.50     Years: 40.00     Pack years: 20.00     Types: Cigarettes    Smokeless tobacco: Never   Substance and Sexual Activity    Alcohol use: Not Currently    Drug use: Never     Family History   Problem Relation Age of Onset    Hypertension Mother     Diabetes Mellitus Mother      Review of patient's allergies indicates:  No Known Allergies     Objective:  Vitals:    10/17/22 0904   BP: (!) 104/59   Pulse: 94   Resp: 18   SpO2: 98%   Weight: 64.4 kg (142 lb)   Height: 5' 3" (1.6 m)   PainSc:   4         Physical Exam  Vitals and nursing note reviewed. Exam conducted with a chaperone present.   Constitutional:       General: She is awake. She is not in acute distress.     Appearance: Normal appearance. She is not ill-appearing or toxic-appearing.   HENT:      Head: Normocephalic and atraumatic.      Nose: Nose normal.      Mouth/Throat:      Mouth: Mucous membranes are moist.      Pharynx: Oropharynx is clear.   Eyes:      Conjunctiva/sclera: Conjunctivae normal.      Pupils: Pupils are equal, round, and reactive to light.   Pulmonary:      Effort: Pulmonary effort is normal. No respiratory distress.   Abdominal:      Palpations: Abdomen is soft.      Tenderness: There is no guarding.   Musculoskeletal:         General: Normal range of motion.      Cervical back: Normal range of motion and neck supple. Tenderness present. No rigidity.      Thoracic back: Tenderness present.      Lumbar back: Tenderness present.   Skin:     General: Skin is warm and dry.      Coloration: Skin is not jaundiced or pale.   Neurological:      General: No focal deficit present.      Mental Status: She is alert and oriented to person, place, and time. Mental status is at baseline.      Cranial Nerves: No cranial " nerve deficit (II-XII).      Gait: Gait abnormal.   Psychiatric:         Mood and Affect: Mood normal.         Behavior: Behavior normal. Behavior is cooperative.         Thought Content: Thought content normal.         EGD w Dilation  Narrative: Table formatting from the original result was not included.  Procedure Date  9/28/22    Impression  Overall   Impression:  Mucosa of the esophagus is grossly normal. The   esophagus was dilated with a 48F Bejarano. The stomach has erythema and   biopsies were obtained. The pyloric channel is patent. Mucosa of the   duodenum is normal.    Recommendation   Await pathology results     Avoid nsaids; ppi 1/AM; repeat dilation prn.    Indication  Dysphagia, unspecified type    Providers  Kelsey Pereira Technician   Venita Lacy, JAX Technician   Michael Long MD Proceduralist   Cristian Max CRNA CRNA     Medications  Moderate sedation administered by anesthesia staff - See anesthesia   record.    Preprocedure  A history and physical has been performed, and patient medication   allergies have been reviewed. The patient's tolerance of previous   anesthesia has been reviewed. The risks and benefits of the procedure and   the sedation options and risks were discussed with the patient. All   questions were answered and informed consent obtained.    ASA Score: ASA 2 - Patient with mild systemic disease with no functional   limitations  Mallampati Airway Score: I (soft palate, uvula, fauces, and tonsillar   pillars visible)    Details of the Procedure  The patient underwent monitored anesthesia care, which was administered by   an anesthesia professional. The patient's blood pressure, heart rate,   level of consciousness, oxygen, respirations, ECG and ETCO2 were monitored   throughout the procedure. The scope was introduced through the mouth and   advanced to the third part of the duodenum. Retroflexion was performed in   the cardia. Prior to the procedure, the patient's H.  Pylori status was   unknown. The patient's estimated blood loss was minimal (<5 mL). The   procedure was not difficult. The patient tolerated the procedure well.   There were no apparent complications.     Scope: Gastroscope  Scope Serial: [id number]    Events  Procedure Events   Event Event Time     Procedure Events   Event Event Time   SCOPE IN 9/28/2022  2:11 PM   SCOPE OUT 9/28/2022  2:14 PM     Findings  Performed forceps biopsies in the stomach  Erythematous mucosa in the fundus of the stomach and antrum; performed   cold forceps biopsy  Dilated in the esophagus with Bejarano dilator       Hospital Outpatient Visit on 09/28/2022   Component Date Value Ref Range Status    POC Glucose 09/28/2022 117 (A)  70 - 110 MG/DL Final    POC Glucose 09/28/2022 119 (H)  70 - 105 mg/dL Final    Case Report 09/28/2022    Final                    Value:Surgical Pathology                                Case: [id number]                                   Authorizing Provider:  Michael Long MD      Collected:           09/28/2022 02:13 PM          Ordering Location:     [facility] - Endoscopy       Received:            09/28/2022 02:57 PM          Pathologist:           Rene Alegre MD                                                            Specimen:    Stomach, A- Stomach Bx                                                                     Final Diagnosis 09/28/2022    Final                    Value:This result contains rich text formatting which cannot be displayed here.    Gross Description 09/28/2022    Final                    Value:This result contains rich text formatting which cannot be displayed here.    Microscopic Description 09/28/2022    Final                    Value:This result contains rich text formatting which cannot be displayed here.    Laboratory Notes 09/28/2022    Final                    Value:This result contains rich text formatting which cannot be displayed here.   Hospital Outpatient Visit on  08/30/2022   Component Date Value Ref Range Status    PT 08/30/2022 12.8  11.7 - 14.7 seconds Final    INR 08/30/2022 1.00  <=3.30 Final    PTT 08/30/2022 28.8  25.2 - 37.3 seconds Final    WBC 08/30/2022 9.79  4.50 - 11.00 K/uL Final    RBC 08/30/2022 3.87 (L)  4.20 - 5.40 M/uL Final    Hemoglobin 08/30/2022 11.9 (L)  12.0 - 16.0 g/dL Final    Hematocrit 08/30/2022 36.2 (L)  38.0 - 47.0 % Final    MCV 08/30/2022 93.5  80.0 - 96.0 fL Final    MCH 08/30/2022 30.7  27.0 - 31.0 pg Final    MCHC 08/30/2022 32.9  32.0 - 36.0 g/dL Final    RDW 08/30/2022 12.3  11.5 - 14.5 % Final    Platelet Count 08/30/2022 219  150 - 400 K/uL Final    MPV 08/30/2022 10.5  9.4 - 12.4 fL Final    Neutrophils % 08/30/2022 59.3  53.0 - 65.0 % Final    Lymphocytes % 08/30/2022 29.7  27.0 - 41.0 % Final    Monocytes % 08/30/2022 6.3 (H)  2.0 - 6.0 % Final    Eosinophils % 08/30/2022 3.7  1.0 - 4.0 % Final    Basophils % 08/30/2022 0.7  0.0 - 1.0 % Final    Immature Granulocytes % 08/30/2022 0.3  0.0 - 0.4 % Final    nRBC, Auto 08/30/2022 0.0  <=0.0 % Final    Neutrophils, Abs 08/30/2022 5.80  1.80 - 7.70 K/uL Final    Lymphocytes, Absolute 08/30/2022 2.91  1.00 - 4.80 K/uL Final    Monocytes, Absolute 08/30/2022 0.62  0.00 - 0.80 K/uL Final    Eosinophils, Absolute 08/30/2022 0.36  0.00 - 0.50 K/uL Final    Basophils, Absolute 08/30/2022 0.07  0.00 - 0.20 K/uL Final    Immature Granulocytes, Absolute 08/30/2022 0.03  0.00 - 0.04 K/uL Final    nRBC, Absolute 08/30/2022 0.00  <=0.00 x10e3/uL Final    Diff Type 08/30/2022 Auto   Final   Lab Visit on 08/23/2022   Component Date Value Ref Range Status    TSH 08/23/2022 5.360 (H)  0.358 - 3.740 uIU/mL Final    Free T3 08/23/2022 2.02 (L)  2.18 - 3.98 pg/mL Final   Office Visit on 08/17/2022   Component Date Value Ref Range Status    POC Amphetamines 08/17/2022 Negative  Negative, Inconclusive Final    POC Barbiturates 08/17/2022 Negative  Negative, Inconclusive Final    POC Benzodiazepines  08/17/2022 Negative  Negative, Inconclusive Final    POC Cocaine 08/17/2022 Negative  Negative, Inconclusive Final    POC THC 08/17/2022 Negative  Negative, Inconclusive Final    POC Methadone 08/17/2022 Negative  Negative, Inconclusive Final    POC Methamphetamine 08/17/2022 Negative  Negative, Inconclusive Final    POC Opiates 08/17/2022 Presumptive Positive (A)  Negative, Inconclusive Final    POC Oxycodone 08/17/2022 Negative  Negative, Inconclusive Final    POC Phencyclidine 08/17/2022 Negative  Negative, Inconclusive Final    POC Methylenedioxymethamphetamine * 08/17/2022 Negative  Negative, Inconclusive Final    POC Tricyclic Antidepressants 08/17/2022 Negative  Negative, Inconclusive Final    POC Buprenorphine 08/17/2022 Negative   Final     Acceptable 08/17/2022 Yes   Final    POC Temperature (Urine) 08/17/2022 92   Final   Office Visit on 07/26/2022   Component Date Value Ref Range Status    POC Amphetamines 07/26/2022 Negative  Negative, Inconclusive Final    POC Barbiturates 07/26/2022 Negative  Negative, Inconclusive Final    POC Benzodiazepines 07/26/2022 Negative  Negative, Inconclusive Final    POC Cocaine 07/26/2022 Negative  Negative, Inconclusive Final    POC THC 07/26/2022 Negative  Negative, Inconclusive Final    POC Methadone 07/26/2022 Negative  Negative, Inconclusive Final    POC Methamphetamine 07/26/2022 Negative  Negative, Inconclusive Final    POC Opiates 07/26/2022 Presumptive Positive (A)  Negative, Inconclusive Final    POC Oxycodone 07/26/2022 Negative  Negative, Inconclusive Final    POC Phencyclidine 07/26/2022 Negative  Negative, Inconclusive Final    POC Methylenedioxymethamphetamine * 07/26/2022 Negative  Negative, Inconclusive Final    POC Tricyclic Antidepressants 07/26/2022 Negative  Negative, Inconclusive Final    POC Buprenorphine 07/26/2022 Negative   Final     Acceptable 07/26/2022 Yes   Final    POC Temperature (Urine) 07/26/2022 94   Final    Lab Visit on 07/21/2022   Component Date Value Ref Range Status    Sodium 07/21/2022 137  136 - 145 mmol/L Final    Potassium 07/21/2022 3.9  3.5 - 5.1 mmol/L Final    Chloride 07/21/2022 101  98 - 107 mmol/L Final    CO2 07/21/2022 26  21 - 32 mmol/L Final    Anion Gap 07/21/2022 14  7 - 16 mmol/L Final    Glucose 07/21/2022 105  74 - 106 mg/dL Final    BUN 07/21/2022 20 (H)  7 - 18 mg/dL Final    Creatinine 07/21/2022 1.18 (H)  0.55 - 1.02 mg/dL Final    BUN/Creatinine Ratio 07/21/2022 17  6 - 20 Final    Calcium 07/21/2022 9.7  8.5 - 10.1 mg/dL Final    Total Protein 07/21/2022 7.4  6.4 - 8.2 g/dL Final    Albumin 07/21/2022 4.0  3.5 - 5.0 g/dL Final    Globulin 07/21/2022 3.4  2.0 - 4.0 g/dL Final    A/G Ratio 07/21/2022 1.2   Final    Bilirubin, Total 07/21/2022 0.4  0.0 - 1.2 mg/dL Final    Alk Phos 07/21/2022 100  55 - 142 U/L Final    ALT 07/21/2022 15  13 - 56 U/L Final    AST 07/21/2022 11 (L)  15 - 37 U/L Final    eGFR 07/21/2022 47 (L)  >=60 mL/min/1.73m² Final    Triglycerides 07/21/2022 223 (H)  35 - 150 mg/dL Final    Cholesterol 07/21/2022 136  0 - 200 mg/dL Final    HDL Cholesterol 07/21/2022 39 (L)  40 - 60 mg/dL Final    Cholesterol/HDL Ratio (Risk Factor) 07/21/2022 3.5   Final    Non-HDL 07/21/2022 97  mg/dL Final    LDL Calculated 07/21/2022 52  mg/dL Final    LDL/HDL 07/21/2022 1.3   Final    VLDL 07/21/2022 45  mg/dL Final    Hemoglobin A1C 07/21/2022 6.5  4.5 - 6.6 % Final    Estimated Average Glucose 07/21/2022 130  mg/dL Final    TSH 07/21/2022 13.400 (H)  0.358 - 3.740 uIU/mL Final    WBC 07/21/2022 8.56  4.50 - 11.00 K/uL Final    RBC 07/21/2022 5.02  4.20 - 5.40 M/uL Final    Hemoglobin 07/21/2022 15.7  12.0 - 16.0 g/dL Final    Hematocrit 07/21/2022 45.9  38.0 - 47.0 % Final    MCV 07/21/2022 91.4  80.0 - 96.0 fL Final    MCH 07/21/2022 31.3 (H)  27.0 - 31.0 pg Final    MCHC 07/21/2022 34.2  32.0 - 36.0 g/dL Final    RDW 07/21/2022 13.0  11.5 - 14.5 % Final    Platelet Count  07/21/2022 243  150 - 400 K/uL Final    MPV 07/21/2022 11.1  9.4 - 12.4 fL Final    Neutrophils % 07/21/2022 43.2 (L)  53.0 - 65.0 % Final    Lymphocytes % 07/21/2022 45.8 (H)  27.0 - 41.0 % Final    Monocytes % 07/21/2022 6.2 (H)  2.0 - 6.0 % Final    Eosinophils % 07/21/2022 3.6  1.0 - 4.0 % Final    Basophils % 07/21/2022 1.1 (H)  0.0 - 1.0 % Final    Immature Granulocytes % 07/21/2022 0.1  0.0 - 0.4 % Final    nRBC, Auto 07/21/2022 0.0  <=0.0 % Final    Neutrophils, Abs 07/21/2022 3.70  1.80 - 7.70 K/uL Final    Lymphocytes, Absolute 07/21/2022 3.92  1.00 - 4.80 K/uL Final    Monocytes, Absolute 07/21/2022 0.53  0.00 - 0.80 K/uL Final    Eosinophils, Absolute 07/21/2022 0.31  0.00 - 0.50 K/uL Final    Basophils, Absolute 07/21/2022 0.09  0.00 - 0.20 K/uL Final    Immature Granulocytes, Absolute 07/21/2022 0.01  0.00 - 0.04 K/uL Final    nRBC, Absolute 07/21/2022 0.00  <=0.00 x10e3/uL Final    Diff Type 07/21/2022 Auto   Final         Orders Placed This Encounter   Procedures    Case Request Operating Room: Block, Nerve, Genicular,     Order Specific Question:   Medical Necessity:     Answer:   Medically Non-Urgent [100]     Order Specific Question:   CPT Code:     Answer:   KS NERVE BLOCK INJ, ANES/STEROID, OTHER PERIPHERAL [85725]     Order Specific Question:   Is an on-site pathologist required for this procedure?     Answer:   N/A         Requested Prescriptions      No prescriptions requested or ordered in this encounter       Assessment:     1. Chronic pain of both knees    2. Lumbosacral spondylosis without myelopathy         A's of Opioid Risk Assessment  Activity:Patient can perform ADL.   Analgesia:Patients pain is partially controlled by current medication. Patient has tried OTC medications such as Tylenol and Ibuprofen with out relief.   Adverse Effects: Patient denies constipation or sedation.  Aberrant Behavior:  reviewed with no aberrant drug seeking/taking behavior.  Overdose reversal drug  naloxone discussed    Drug screen reviewed      Plan:    Cardiac defibrillator cannot have MRI    History lumbar surgery 2005, Dr. Woods [facility]    She states her left knee pain improved after injection last visit     Requesting genicular nerve block bilateral     Chronic bilateral knee pain     X-ray bilateral knee [facility] September 4, 2020 degenerative changes noted no fracture noted    I have given the patient medically directed home exercise program    Monitor anesthesia request is medically indicated for the scheduled nerve block procedure due to:  1- needle phobia and anxiety, placing  the patient at risk during the provided service.  2-patient has an ASA class greater than 3 and requires constant presence of an anesthesiologist during the procedure,   3-patient has severe problems hard to lie still  4-patient suffers from chronic pain and is unable to function due to  diminished ADLs    Schedule bilateral genicular nerve block # 1, bilateral knee pain    Dr. Farah    Bring original prescription medication bottles/container/box with labels to each visit    Pill count    Physical therapy    Massage therapy declines

## 2022-10-27 ENCOUNTER — OFFICE VISIT (OUTPATIENT)
Dept: FAMILY MEDICINE | Facility: CLINIC | Age: 82
End: 2022-10-27
Payer: MEDICARE

## 2022-10-27 VITALS
SYSTOLIC BLOOD PRESSURE: 110 MMHG | HEART RATE: 90 BPM | TEMPERATURE: 98 F | DIASTOLIC BLOOD PRESSURE: 50 MMHG | WEIGHT: 144 LBS | BODY MASS INDEX: 25.52 KG/M2 | RESPIRATION RATE: 18 BRPM | OXYGEN SATURATION: 98 % | HEIGHT: 63 IN

## 2022-10-27 DIAGNOSIS — Z23 NEED FOR IMMUNIZATION AGAINST INFLUENZA: ICD-10-CM

## 2022-10-27 DIAGNOSIS — E78.5 DYSLIPIDEMIA: ICD-10-CM

## 2022-10-27 DIAGNOSIS — F32.A DEPRESSION, UNSPECIFIED DEPRESSION TYPE: ICD-10-CM

## 2022-10-27 DIAGNOSIS — E03.9 HYPOTHYROIDISM, UNSPECIFIED TYPE: ICD-10-CM

## 2022-10-27 DIAGNOSIS — E11.9 CONTROLLED TYPE 2 DIABETES MELLITUS WITHOUT COMPLICATION, WITHOUT LONG-TERM CURRENT USE OF INSULIN: ICD-10-CM

## 2022-10-27 DIAGNOSIS — I10 HYPERTENSION, UNSPECIFIED TYPE: Primary | ICD-10-CM

## 2022-10-27 LAB
CREAT UR-MCNC: 59 MG/DL (ref 28–219)
MICROALBUMIN UR-MCNC: 2 MG/DL (ref 0–2.8)
MICROALBUMIN/CREAT RATIO PNL UR: 33.9 MG/G (ref 0–30)

## 2022-10-27 PROCEDURE — 99214 OFFICE O/P EST MOD 30 MIN: CPT | Mod: ,,, | Performed by: NURSE PRACTITIONER

## 2022-10-27 PROCEDURE — 1101F PT FALLS ASSESS-DOCD LE1/YR: CPT | Mod: ,,, | Performed by: NURSE PRACTITIONER

## 2022-10-27 PROCEDURE — 1159F PR MEDICATION LIST DOCUMENTED IN MEDICAL RECORD: ICD-10-PCS | Mod: ,,, | Performed by: NURSE PRACTITIONER

## 2022-10-27 PROCEDURE — G0008 ADMIN INFLUENZA VIRUS VAC: HCPCS | Mod: ,,, | Performed by: NURSE PRACTITIONER

## 2022-10-27 PROCEDURE — G0008 FLU VACCINE - QUADRIVALENT - ADJUVANTED: ICD-10-PCS | Mod: ,,, | Performed by: NURSE PRACTITIONER

## 2022-10-27 PROCEDURE — 3288F FALL RISK ASSESSMENT DOCD: CPT | Mod: ,,, | Performed by: NURSE PRACTITIONER

## 2022-10-27 PROCEDURE — 1160F RVW MEDS BY RX/DR IN RCRD: CPT | Mod: ,,, | Performed by: NURSE PRACTITIONER

## 2022-10-27 PROCEDURE — 82570 ASSAY OF URINE CREATININE: CPT | Mod: ,,, | Performed by: CLINICAL MEDICAL LABORATORY

## 2022-10-27 PROCEDURE — 3074F SYST BP LT 130 MM HG: CPT | Mod: ,,, | Performed by: NURSE PRACTITIONER

## 2022-10-27 PROCEDURE — 82043 UR ALBUMIN QUANTITATIVE: CPT | Mod: ,,, | Performed by: CLINICAL MEDICAL LABORATORY

## 2022-10-27 PROCEDURE — 3288F PR FALLS RISK ASSESSMENT DOCUMENTED: ICD-10-PCS | Mod: ,,, | Performed by: NURSE PRACTITIONER

## 2022-10-27 PROCEDURE — 90694 FLU VACCINE - QUADRIVALENT - ADJUVANTED: ICD-10-PCS | Mod: ,,, | Performed by: NURSE PRACTITIONER

## 2022-10-27 PROCEDURE — 82043 MICROALBUMIN / CREATININE RATIO URINE: ICD-10-PCS | Mod: ,,, | Performed by: CLINICAL MEDICAL LABORATORY

## 2022-10-27 PROCEDURE — 3078F PR MOST RECENT DIASTOLIC BLOOD PRESSURE < 80 MM HG: ICD-10-PCS | Mod: ,,, | Performed by: NURSE PRACTITIONER

## 2022-10-27 PROCEDURE — 1126F PR PAIN SEVERITY QUANTIFIED, NO PAIN PRESENT: ICD-10-PCS | Mod: ,,, | Performed by: NURSE PRACTITIONER

## 2022-10-27 PROCEDURE — 90694 VACC AIIV4 NO PRSRV 0.5ML IM: CPT | Mod: ,,, | Performed by: NURSE PRACTITIONER

## 2022-10-27 PROCEDURE — 1160F PR REVIEW ALL MEDS BY PRESCRIBER/CLIN PHARMACIST DOCUMENTED: ICD-10-PCS | Mod: ,,, | Performed by: NURSE PRACTITIONER

## 2022-10-27 PROCEDURE — 3078F DIAST BP <80 MM HG: CPT | Mod: ,,, | Performed by: NURSE PRACTITIONER

## 2022-10-27 PROCEDURE — 1101F PR PT FALLS ASSESS DOC 0-1 FALLS W/OUT INJ PAST YR: ICD-10-PCS | Mod: ,,, | Performed by: NURSE PRACTITIONER

## 2022-10-27 PROCEDURE — 3074F PR MOST RECENT SYSTOLIC BLOOD PRESSURE < 130 MM HG: ICD-10-PCS | Mod: ,,, | Performed by: NURSE PRACTITIONER

## 2022-10-27 PROCEDURE — 1159F MED LIST DOCD IN RCRD: CPT | Mod: ,,, | Performed by: NURSE PRACTITIONER

## 2022-10-27 PROCEDURE — 99214 PR OFFICE/OUTPT VISIT, EST, LEVL IV, 30-39 MIN: ICD-10-PCS | Mod: ,,, | Performed by: NURSE PRACTITIONER

## 2022-10-27 PROCEDURE — 82570 MICROALBUMIN / CREATININE RATIO URINE: ICD-10-PCS | Mod: ,,, | Performed by: CLINICAL MEDICAL LABORATORY

## 2022-10-27 PROCEDURE — 1126F AMNT PAIN NOTED NONE PRSNT: CPT | Mod: ,,, | Performed by: NURSE PRACTITIONER

## 2022-10-27 RX ORDER — ESOMEPRAZOLE MAGNESIUM 40 MG/1
40 CAPSULE, DELAYED RELEASE ORAL DAILY
Qty: 90 CAPSULE | Refills: 1 | Status: SHIPPED | OUTPATIENT
Start: 2022-10-27 | End: 2023-01-18 | Stop reason: SDUPTHER

## 2022-10-27 RX ORDER — METHOCARBAMOL 500 MG/1
500 TABLET, FILM COATED ORAL 2 TIMES DAILY
Qty: 180 TABLET | Refills: 1 | Status: SHIPPED | OUTPATIENT
Start: 2022-10-27 | End: 2023-01-18 | Stop reason: SDUPTHER

## 2022-10-27 RX ORDER — ROSUVASTATIN CALCIUM 40 MG/1
40 TABLET, COATED ORAL NIGHTLY
Qty: 90 TABLET | Refills: 1 | Status: SHIPPED | OUTPATIENT
Start: 2022-10-27 | End: 2023-01-18 | Stop reason: SDUPTHER

## 2022-10-27 RX ORDER — MONTELUKAST SODIUM 10 MG/1
10 TABLET ORAL NIGHTLY
Qty: 90 TABLET | Refills: 1 | Status: SHIPPED | OUTPATIENT
Start: 2022-10-27 | End: 2022-11-21 | Stop reason: SDUPTHER

## 2022-10-27 RX ORDER — AMITRIPTYLINE HYDROCHLORIDE 25 MG/1
25 TABLET, FILM COATED ORAL NIGHTLY PRN
Qty: 90 TABLET | Refills: 1 | Status: SHIPPED | OUTPATIENT
Start: 2022-10-27 | End: 2023-01-18 | Stop reason: SDUPTHER

## 2022-10-27 RX ORDER — ICOSAPENT ETHYL 500 MG/1
1 CAPSULE ORAL 2 TIMES DAILY
Qty: 180 CAPSULE | Refills: 1 | Status: SHIPPED | OUTPATIENT
Start: 2022-10-27 | End: 2023-01-18 | Stop reason: SDUPTHER

## 2022-10-27 RX ORDER — AZITHROMYCIN 250 MG/1
TABLET, FILM COATED ORAL
Qty: 6 TABLET | Refills: 0 | Status: SHIPPED | OUTPATIENT
Start: 2022-10-27 | End: 2022-11-01

## 2022-10-27 RX ORDER — LISINOPRIL 5 MG/1
5 TABLET ORAL DAILY
Qty: 90 TABLET | Refills: 1 | Status: SHIPPED | OUTPATIENT
Start: 2022-10-27 | End: 2022-11-21 | Stop reason: SDUPTHER

## 2022-10-27 RX ORDER — SPIRONOLACTONE 25 MG/1
25 TABLET ORAL DAILY
Qty: 90 TABLET | Refills: 1 | Status: SHIPPED | OUTPATIENT
Start: 2022-10-27 | End: 2023-01-18 | Stop reason: SDUPTHER

## 2022-10-27 RX ORDER — FUROSEMIDE 40 MG/1
40 TABLET ORAL DAILY
Qty: 90 TABLET | Refills: 1 | Status: SHIPPED | OUTPATIENT
Start: 2022-10-27 | End: 2023-01-18 | Stop reason: SDUPTHER

## 2022-10-27 RX ORDER — LEVOTHYROXINE SODIUM 75 UG/1
75 TABLET ORAL DAILY
COMMUNITY
Start: 2022-10-21 | End: 2022-11-07 | Stop reason: SDUPTHER

## 2022-10-27 RX ORDER — SERTRALINE HYDROCHLORIDE 50 MG/1
50 TABLET, FILM COATED ORAL DAILY
Qty: 90 TABLET | Refills: 1 | Status: SHIPPED | OUTPATIENT
Start: 2022-10-27 | End: 2023-01-18 | Stop reason: SDUPTHER

## 2022-10-27 NOTE — PROGRESS NOTES
Subjective:       Patient ID: Sharee Elizondo is a 82 y.o. female.    Chief Complaint: Follow-up    Ms. Elizondo presents to clinic with her daughter, she complains of allergy symptoms recently and now has a productive cough, denies dyspnea. She is following up for DM, HTN, HLD, hypothyroidism, reports Dr. Garcia recently performed her labs and adjusted her levothyroxine to 75mcg.    Review of Systems   Constitutional:  Negative for fatigue.   HENT:  Positive for postnasal drip.    Respiratory:  Positive for cough. Negative for shortness of breath and wheezing.    Cardiovascular: Negative.    Gastrointestinal: Negative.    Genitourinary: Negative.    Neurological: Negative.    Psychiatric/Behavioral: Negative.         Objective:      Physical Exam  Vitals and nursing note reviewed.   Constitutional:       Appearance: Normal appearance.   HENT:      Head: Normocephalic.   Cardiovascular:      Rate and Rhythm: Normal rate and regular rhythm.      Pulses: Normal pulses.      Heart sounds: Normal heart sounds.   Pulmonary:      Effort: Pulmonary effort is normal.      Breath sounds: Normal breath sounds. No rhonchi.   Abdominal:      General: Bowel sounds are normal.      Palpations: Abdomen is soft.   Musculoskeletal:         General: Normal range of motion.   Skin:     General: Skin is warm and dry.   Neurological:      Mental Status: She is alert and oriented to person, place, and time.   Psychiatric:         Behavior: Behavior normal.       Assessment:       Problem List Items Addressed This Visit    None  Visit Diagnoses       Hypertension, unspecified type    -  Primary    Need for immunization against influenza        Relevant Orders    Influenza (FLUAD) - Quadrivalent (Adjuvanted) *Preferred* (65+) (PF) (Completed)    Depression, unspecified depression type        Relevant Medications    sertraline (ZOLOFT) 50 MG tablet    Controlled type 2 diabetes mellitus without complication, without long-term current use of insulin         Dyslipidemia        Hypothyroidism, unspecified type                  Plan:        Refill medications, repeat TSH in 6-8 weeks.   Take zpack as directed for AR and cough.

## 2022-10-31 ENCOUNTER — TELEPHONE (OUTPATIENT)
Dept: FAMILY MEDICINE | Facility: CLINIC | Age: 82
End: 2022-10-31
Payer: MEDICARE

## 2022-10-31 NOTE — TELEPHONE ENCOUNTER
Notified pt of results, pt voiced understanding      ----- Message from JOSELITO Mora sent at 10/31/2022 10:31 AM CDT -----  Urine diabetic screening ok.

## 2022-11-02 ENCOUNTER — TELEPHONE (OUTPATIENT)
Dept: FAMILY MEDICINE | Facility: CLINIC | Age: 82
End: 2022-11-02
Payer: MEDICARE

## 2022-11-02 NOTE — TELEPHONE ENCOUNTER
Notified pt of labs, and to continue her current does of medication she was on. Pt verbalized understanding .

## 2022-11-07 RX ORDER — LEVOTHYROXINE SODIUM 75 UG/1
75 TABLET ORAL DAILY
Qty: 90 TABLET | Refills: 1 | OUTPATIENT
Start: 2022-11-07

## 2022-11-07 RX ORDER — LEVOTHYROXINE SODIUM 75 UG/1
75 TABLET ORAL DAILY
Qty: 90 TABLET | Refills: 1 | Status: SHIPPED | OUTPATIENT
Start: 2022-11-07 | End: 2023-01-18 | Stop reason: SDUPTHER

## 2022-11-08 ENCOUNTER — ANESTHESIA (OUTPATIENT)
Dept: PAIN MEDICINE | Facility: HOSPITAL | Age: 82
End: 2022-11-08
Payer: MEDICARE

## 2022-11-08 ENCOUNTER — HOSPITAL ENCOUNTER (OUTPATIENT)
Facility: HOSPITAL | Age: 82
Discharge: HOME | End: 2022-11-08
Attending: PAIN MEDICINE | Admitting: PAIN MEDICINE
Payer: MEDICARE

## 2022-11-08 ENCOUNTER — ANESTHESIA EVENT (OUTPATIENT)
Dept: PAIN MEDICINE | Facility: HOSPITAL | Age: 82
End: 2022-11-08
Payer: MEDICARE

## 2022-11-08 VITALS
HEIGHT: 63 IN | WEIGHT: 142 LBS | RESPIRATION RATE: 16 BRPM | OXYGEN SATURATION: 98 % | HEART RATE: 77 BPM | DIASTOLIC BLOOD PRESSURE: 45 MMHG | TEMPERATURE: 98 F | SYSTOLIC BLOOD PRESSURE: 99 MMHG | BODY MASS INDEX: 25.16 KG/M2

## 2022-11-08 DIAGNOSIS — M25.562 CHRONIC PAIN OF BOTH KNEES: Primary | Chronic | ICD-10-CM

## 2022-11-08 DIAGNOSIS — M25.561 CHRONIC PAIN OF BOTH KNEES: Primary | Chronic | ICD-10-CM

## 2022-11-08 DIAGNOSIS — M25.562 KNEE PAIN, BILATERAL: ICD-10-CM

## 2022-11-08 DIAGNOSIS — M25.561 KNEE PAIN, BILATERAL: ICD-10-CM

## 2022-11-08 DIAGNOSIS — G89.29 CHRONIC PAIN OF BOTH KNEES: Primary | Chronic | ICD-10-CM

## 2022-11-08 LAB — GLUCOSE SERPL-MCNC: 108 MG/DL (ref 70–105)

## 2022-11-08 PROCEDURE — 82962 GLUCOSE BLOOD TEST: CPT

## 2022-11-08 PROCEDURE — 63600175 PHARM REV CODE 636 W HCPCS: Performed by: NURSE ANESTHETIST, CERTIFIED REGISTERED

## 2022-11-08 PROCEDURE — 64454 NJX AA&/STRD GNCLR NRV BRNCH: CPT | Mod: LT | Performed by: PAIN MEDICINE

## 2022-11-08 PROCEDURE — 27000284 HC CANNULA NASAL: Performed by: NURSE ANESTHETIST, CERTIFIED REGISTERED

## 2022-11-08 PROCEDURE — 64454 PR NERVE BLOCK INJ, ANES/STEROID, GENICULAR NERVE, W/IMG: ICD-10-PCS | Mod: 50,,, | Performed by: PAIN MEDICINE

## 2022-11-08 PROCEDURE — 63600175 PHARM REV CODE 636 W HCPCS: Performed by: PAIN MEDICINE

## 2022-11-08 PROCEDURE — 64454 NJX AA&/STRD GNCLR NRV BRNCH: CPT | Mod: 50,,, | Performed by: PAIN MEDICINE

## 2022-11-08 PROCEDURE — 01991 ANES DX/THER NRV BLK&INJ OTH: CPT | Performed by: PAIN MEDICINE

## 2022-11-08 PROCEDURE — D9220A PRA ANESTHESIA: Mod: ,,, | Performed by: NURSE ANESTHETIST, CERTIFIED REGISTERED

## 2022-11-08 PROCEDURE — 25000003 PHARM REV CODE 250: Performed by: NURSE ANESTHETIST, CERTIFIED REGISTERED

## 2022-11-08 PROCEDURE — 25000003 PHARM REV CODE 250: Performed by: PAIN MEDICINE

## 2022-11-08 PROCEDURE — 37000008 HC ANESTHESIA 1ST 15 MINUTES: Performed by: PAIN MEDICINE

## 2022-11-08 PROCEDURE — D9220A PRA ANESTHESIA: ICD-10-PCS | Mod: ,,, | Performed by: NURSE ANESTHETIST, CERTIFIED REGISTERED

## 2022-11-08 PROCEDURE — 37000009 HC ANESTHESIA EA ADD 15 MINS: Performed by: PAIN MEDICINE

## 2022-11-08 RX ORDER — BUPIVACAINE HYDROCHLORIDE 2.5 MG/ML
INJECTION, SOLUTION INFILTRATION; PERINEURAL CODE/TRAUMA/SEDATION MEDICATION
Status: DISCONTINUED | OUTPATIENT
Start: 2022-11-08 | End: 2022-11-08 | Stop reason: HOSPADM

## 2022-11-08 RX ORDER — PROPOFOL 10 MG/ML
VIAL (ML) INTRAVENOUS
Status: DISCONTINUED | OUTPATIENT
Start: 2022-11-08 | End: 2022-11-08

## 2022-11-08 RX ORDER — TRIAMCINOLONE ACETONIDE 40 MG/ML
INJECTION, SUSPENSION INTRA-ARTICULAR; INTRAMUSCULAR CODE/TRAUMA/SEDATION MEDICATION
Status: DISCONTINUED | OUTPATIENT
Start: 2022-11-08 | End: 2022-11-08 | Stop reason: HOSPADM

## 2022-11-08 RX ORDER — LIDOCAINE HYDROCHLORIDE 20 MG/ML
INJECTION, SOLUTION EPIDURAL; INFILTRATION; INTRACAUDAL; PERINEURAL
Status: DISCONTINUED | OUTPATIENT
Start: 2022-11-08 | End: 2022-11-08

## 2022-11-08 RX ORDER — SODIUM CHLORIDE 9 MG/ML
INJECTION, SOLUTION INTRAVENOUS CONTINUOUS
Status: DISCONTINUED | OUTPATIENT
Start: 2022-11-08 | End: 2022-11-08 | Stop reason: HOSPADM

## 2022-11-08 RX ADMIN — LIDOCAINE HYDROCHLORIDE 100 MG: 20 INJECTION, SOLUTION INTRAVENOUS at 11:11

## 2022-11-08 RX ADMIN — PROPOFOL 50 MG: 10 INJECTION, EMULSION INTRAVENOUS at 11:11

## 2022-11-08 RX ADMIN — PROPOFOL 100 MG: 10 INJECTION, EMULSION INTRAVENOUS at 11:11

## 2022-11-08 RX ADMIN — SODIUM CHLORIDE: 9 INJECTION, SOLUTION INTRAVENOUS at 11:11

## 2022-11-08 NOTE — DISCHARGE SUMMARY
[facility] - Pain Management  Discharge Note  Short Stay    Procedure(s) (LRB):  Block, Nerve, Genicular, (Bilateral)      OUTCOME: Patient tolerated treatment/procedure well without complication and is now ready for discharge.    DISPOSITION: Home or Self Care    FINAL DIAGNOSIS:  Bilateral knee pain and osteoarthritis    FOLLOWUP: In clinic    DISCHARGE INSTRUCTIONS:  See nurse's notes     TIME SPENT ON DISCHARGE: 5 minutes

## 2022-11-08 NOTE — DISCHARGE INSTRUCTIONS
REFER TO WRITTEN DOCUMENT AND RECOVERY INFORMATION.      INFORMED PATIENT IF UNABLE TO VOID IN 8 HOURS, GO TO ER. NOTIFY MD OF REDNESS OR DRAINAGE FROM INJECTION SITE OR FEVER OVER 3-4 DAY. REST AND DRINK PLENTY OF FLUIDS FOR THE REMAINDER OF THE DAY. NO LIFTING OVER 5 LBS FOR THE REMAINDER OF THE DAY. CONTINUE REGULAR MEDICATIONS AS PRESCRIBED. MAY TAKE PAIN MEDICATION AS PRESCRIBED.

## 2022-11-08 NOTE — BRIEF OP NOTE
Discharge Note  Short Stay    Admit Date: 11/8/2022    Discharge Date: 11/8/2022    Attending Physician: Angie Farah     Discharge Provider: Angie Farah    Diagnoses:  Bilateral knee pain and osteoarthritis    Discharged Condition: Good    Final Diagnoses: Chronic pain of both knees [M25.561, M25.562, G89.29]    Disposition: Home or Self Care    Hospital Course: No complications, uneventful    Outcome of Hospitalization, Treatment, Procedure, or Surgery:  Patient was admitted for outpatient interventional pain management procedure. The patient tolerated the procedure well with no complications.    Follow up/Patient Instructions:  Follow up as scheduled in Pain Management office in 3-4 weeks.  Patient has received instructions and follow up date and time.    Medications:  Continue previous medications

## 2022-11-08 NOTE — TRANSFER OF CARE
"Anesthesia Transfer of Care Note    Patient: Sharee Elizondo    Procedure(s) Performed: Procedure(s) (LRB):  Block, Nerve, Genicular, (Bilateral)    Patient location: PACU    Anesthesia Type: general    Transport from OR: Transported from OR on room air with adequate spontaneous ventilation    Post pain: adequate analgesia    Post assessment: no apparent anesthetic complications    Post vital signs: stable    Level of consciousness: sedated    Nausea/Vomiting: no nausea/vomiting    Complications: none    Transfer of care protocol was followed      Last vitals:   Visit Vitals  BP (!) 69/34   Pulse 80   Temp 36.7 °C (98 °F)   Resp 14   Ht 5' 3" (1.6 m)   Wt 64.4 kg (142 lb)   SpO2 96%   BMI 25.15 kg/m²     "

## 2022-11-08 NOTE — PLAN OF CARE
REFER TO WRITTEN DOCUMENT AND RECOVERY INFORMATION.    D/CD PATIENT VIAA WHEELCHAIR AT 1208.    INFORMED PATIENT IF UNABLE TO VOID IN 8 HOURS, GO TO ER. NOTIFY MD OF REDNESS OR DRAINAGE FROM INJECTION SITE OR FEVER OVER 3-4 DAY. REST AND DRINK PLENTY OF FLUIDS FOR THE REMAINDER OF THE DAY. NO LIFTING OVER 5 LBS FOR THE REMAINDER OF THE DAY. CONTINUE REGULAR MEDICATIONS AS PRESCRIBED. MAY TAKE PAIN MEDICATION AS PRESCRIBED.     PAIN IMPROVED  100%

## 2022-11-08 NOTE — ANESTHESIA POSTPROCEDURE EVALUATION
Anesthesia Post Evaluation    Patient: Sharee Elizondo    Procedure(s) Performed: Procedure(s) (LRB):  Block, Nerve, Genicular, (Bilateral)    Final Anesthesia Type: general      Patient location during evaluation: PACU  Patient participation: Yes- Able to Participate  Level of consciousness: awake and alert  Post-procedure vital signs: reviewed and stable  Pain management: adequate  Airway patency: patent    PONV status at discharge: No PONV  Anesthetic complications: no      Cardiovascular status: blood pressure returned to baseline  Respiratory status: unassisted  Hydration status: euvolemic  Follow-up not needed.          Vitals Value Taken Time   /52 11/08/22 1157   Temp 36.7 °C (98 °F) 11/08/22 1133   Pulse 85 11/08/22 1158   Resp 18 11/08/22 1158   SpO2 98 % 11/08/22 1158   Vitals shown include unvalidated device data.      No case tracking events are documented in the log.      Pain/Christiano Score: Christiano Score: 10 (11/8/2022 11:45 AM)

## 2022-11-08 NOTE — OP NOTE
"Procedure Note    Pre-operative Diagnosis: Chronic Knee Pain  Post-operative Diagnosis: Chronic Knee Pain  Procedure Date: 11/08/2022  Procedure:  (1) Bilateral Peripheral Nerve Block at Three Locations    a) Superior medial genicular nerve   b) Inferior medial genicular nerve   c) Superior lateral genicular nerve  (2) Procedural Fluoroscopy    Indications: This is a diagnostic and therapeutic intervention to alleviate pain and suffering, and reduce functional impairment associated with chronic knee pain.      The patients history and physical exam were reviewed. The risks, benefits and alternatives to the procedure were discussed, and all questions were answered to the patients satisfaction. The patient agreed to proceed, and written informed consent was verified and signed.    Procedure in Detail: The patient was brought into the procedure room and placed in the supine position on the fluoroscopy table. The area of the knee was prepped with Chloraprep and draped in a sterile manner. In an A-P fluoroscopic view, the target locations were identified and the overlying skin and subcutaneous tissues anesthetized with 1% lidocaine using a 25G, 1.5" needle.  A 22G 3.5" spinal needle was introduced through the anesthetized skin and directed down to the junction of the femoral diaphysis and the medial femoral epicondyle; then another needle to the femoral diaphysis and the lateral femoral epicondyle; and the 3rd needle to the tibial diaphysis and medial tibial condyle.    Needle placement was optimized using lateral fluoroscopic views.  Stylettes were removed.  Following heme-negative aspiration, 3 cc from a 20 cc solution of 0.25% Marcaine with 40 mg of Kenalog was injected at each site with minimal resistance.  The needles were removed and a bandage applied to each puncture site.    Disposition: The patient tolerated the procedure well, and there were no apparent complications. Vital signs remained stable throughout " the procedure. The patient was taken to the recovery area where written discharge instructions for the procedure were given.     Follow-up: Patient is to report any changes in pain level of the knee to our office at which time a determination will be made regarding RFA of the genicular nerves.

## 2022-11-08 NOTE — ANESTHESIA PREPROCEDURE EVALUATION
11/08/2022  Sharee Elizondo is a 82 y.o., female.    Past Medical History:   Diagnosis Date    Acute superficial gastritis without hemorrhage 2/18/2022    Atrial fibrillation     Automatic implantable cardiac defibrillator in situ     Bilateral primary osteoarthritis of knee     Cervical spondylosis without myelopathy     CHF (congestive heart failure)     Chronic ischemic heart disease     Chronic kidney disease (CKD), stage III (moderate)     Chronic pain syndrome     COPD (chronic obstructive pulmonary disease)     Coronary arteriosclerosis     Depressive disorder     Esophageal dysphagia 2/18/2022    GERD (gastroesophageal reflux disease)     HH (hiatus hernia) 2/18/2022    Hypothyroidism     Low back pain     Lumbar radiculopathy     Melanoma 2019    scalp    Metabolic syndrome     Myocardial ischemia     Other long term (current) drug therapy     Radiculopathy, cervical region     Type 2 diabetes mellitus        Past Surgical History:   Procedure Laterality Date    BACK SURGERY      bilateral knee injection/ aspiration of joint/ bursa-large  Bilateral 4/4/2017 3/27/2017 3/20/2017    bilateral knee orthovisc injection    CARDIAC SURGERY      EPIDURAL STEROID INJECTION INTO CERVICAL SPINE  6/19/2019 5/17/2017 4/26/2017    C6-7 LONNIEDr Farah    EPIDURAL STEROID INJECTION INTO LUMBAR SPINE  03/15/2018    L4-5 LONNIEDr Farah    HYSTERECTOMY      INJECTION OF ANESTHETIC AGENT AROUND MEDIAL BRANCH NERVES INNERVATING LUMBAR FACET JOINT Bilateral 4/14/2022    Procedure: Block-nerve-medial branch-lumbar, bilateral L4 through S1;  Surgeon: Angie Farah MD;  Location: [facility];  Service: Pain Management;  Laterality: Bilateral;  pt aware at visit to be tested  4/12 patient getting covid test today    INJECTION OF FACET JOINT Bilateral 8/17/2017 7/19/2017    Bilateral C3-7  FI, Dr Farah    INJECTION OF FACET JOINT Left 10/18/2018    left C3-7 FI, Dr Farah    INJECTION OF FACET JOINT Bilateral 8/22/2018 6/29/2018    bilateral L3-S1 FI, Dr Farah    RADIOFREQUENCY ABLATION Right 12/12/2018    Right C3-7 RF, Dr Farah    RADIOFREQUENCY ABLATION Left 01/9/2019 10/16/2017 10/7/2020    Left C3-7 RF, Dr Farah    RADIOFREQUENCY ABLATION Left 1/9/2019 10/16/2017    left C3-7 RF, Dr Farah    RADIOFREQUENCY THERMAL COAGULATION OF MEDIAL BRANCH OF POSTERIOR RAMUS OF CERVICAL SPINAL NERVE Left 8/5/2021    Procedure: RADIOFREQUENCY THERMAL COAGULATION, NERVE, SPINAL, CERVICAL, POSTERIOR RAMUS, MEDIAL BRANCH, Left C3-4,4-5  only 2 levels resubmitted dt denied all levels on 8-3-2021;  Surgeon: Angie Farah MD;  Location[facility];  Service: Pain Management;  Laterality: Left;    RADIOFREQUENCY THERMAL COAGULATION OF MEDIAL BRANCH OF POSTERIOR RAMUS OF CERVICAL SPINAL NERVE Right 8/24/2021    Procedure: RADIOFREQUENCY THERMAL COAGULATION, NERVE, SPINAL, CERVICAL, POSTERIOR RAMUS, MEDIAL BRANCH RIGHT C3-C5 RFTC;  Surgeon: Angie Farah MD;  Location: [facility];  Service: Pain Management;  Laterality: Right;  HAD VAC WILL BRING CARD    TRANSFORAMINAL EPIDURAL INJECTION OF STEROID Right 5/30/2018 5/2/2018    Right L4-5 TFESI, Dr Farah       Family History   Problem Relation Age of Onset    Hypertension Mother     Diabetes Mellitus Mother        Social History     Socioeconomic History    Marital status:    Tobacco Use    Smoking status: Every Day     Packs/day: 0.50     Years: 40.00     Pack years: 20.00     Types: Cigarettes    Smokeless tobacco: Never   Substance and Sexual Activity    Alcohol use: Not Currently    Drug use: Never       No current facility-administered medications for this visit.     No current outpatient medications on file.     Facility-Administered Medications Ordered in Other Visits   Medication Dose Route Frequency Provider Last  Rate Last Admin    0.9%  NaCl infusion   Intravenous Continuous Angie Farah MD           Review of patient's allergies indicates:  No Known Allergies    Pre-op Assessment    I have reviewed the Patient Summary Reports.     I have reviewed the Nursing Notes. I have reviewed the NPO Status.   I have reviewed the Medications.     Review of Systems  Anesthesia Hx:  No problems with previous Anesthesia  Denies Family Hx of Anesthesia complications.   Denies Personal Hx of Anesthesia complications.   Hematology/Oncology:     Oncology Normal     EENT/Dental:EENT/Dental Normal   Cardiovascular:   Pacemaker CAD   CHF hyperlipidemia    Pulmonary:   COPD, moderate  Chronic Obstructive Pulmonary Disease (COPD):    Renal/:   Chronic Renal Disease    Hepatic/GI:   Hiatal Hernia, GERD    Musculoskeletal:   Arthritis     Neurological:   Neuromuscular Disease,   Chronic Pain Syndrome   Endocrine:   Diabetes, type 2 Hypothyroidism    Dermatological:  Skin Normal    Psych:   Psychiatric History depression          Physical Exam  General: Well nourished, Alert, Oriented and Cooperative    Airway:  Mallampati: II   Mouth Opening: Normal  Neck ROM: Normal ROM    Chest/Lungs:  Normal Respiratory Rate    Heart:  Rate: Normal        Anesthesia Plan  Type of Anesthesia, risks & benefits discussed:    Anesthesia Type: Gen Natural Airway  Intra-op Monitoring Plan: Standard ASA Monitors  Post Op Pain Control Plan: multimodal analgesia  Induction:  IV  Informed Consent: Informed consent signed with the Patient and all parties understand the risks and agree with anesthesia plan.  All questions answered. Patient consented to blood products? Yes  ASA Score: 3  Day of Surgery Review of History & Physical: H&P Update referred to the surgeon/provider.I have interviewed and examined the patient. I have reviewed the patient's H&P dated: There are no significant changes.     Ready For Surgery From Anesthesia Perspective.     .

## 2022-11-09 DIAGNOSIS — Z71.89 COMPLEX CARE COORDINATION: ICD-10-CM

## 2022-11-17 NOTE — PROGRESS NOTES
Subjective:         Patient ID: Sharee Elizondo is a 82 y.o. female.    Chief Complaint: Knee Pain      Pain  This is a chronic problem. The current episode started more than 1 year ago. The problem occurs daily. The problem has been unchanged. Associated symptoms include arthralgias and neck pain. Pertinent negatives include no anorexia, change in bowel habit, chest pain, chills, coughing, diaphoresis, fever, rash, sore throat, urinary symptoms, vertigo or vomiting.   Review of Systems   Constitutional:  Negative for activity change, appetite change, chills, diaphoresis, fever and unexpected weight change.   HENT:  Negative for drooling, ear discharge, ear pain, facial swelling, mouth dryness, nosebleeds, sore throat, trouble swallowing, voice change and goiter.    Eyes:  Negative for photophobia, pain, discharge, redness and visual disturbance.   Respiratory:  Negative for apnea, cough, choking, chest tightness, shortness of breath, wheezing and stridor.    Cardiovascular:  Negative for chest pain, palpitations and leg swelling.   Gastrointestinal:  Negative for abdominal distention, anorexia, change in bowel habit, diarrhea, rectal pain, vomiting, fecal incontinence and change in bowel habit.   Endocrine: Negative for cold intolerance, heat intolerance, polydipsia, polyphagia and polyuria.   Genitourinary:  Negative for bladder incontinence, dysuria, flank pain, frequency and hot flashes.   Musculoskeletal:  Positive for arthralgias, back pain, leg pain and neck pain.   Integumentary:  Negative for color change, pallor and rash.   Allergic/Immunologic: Negative for immunocompromised state.   Neurological:  Negative for dizziness, vertigo, seizures, syncope, facial asymmetry, speech difficulty, light-headedness, coordination difficulties, memory loss and coordination difficulties.   Hematological:  Negative for adenopathy. Does not bruise/bleed easily.   Psychiatric/Behavioral:  Negative for agitation, behavioral  problems, confusion, decreased concentration, dysphoric mood, hallucinations, self-injury and suicidal ideas. The patient is not nervous/anxious and is not hyperactive.          Past Medical History:   Diagnosis Date    Acute superficial gastritis without hemorrhage 2/18/2022    Atrial fibrillation     Automatic implantable cardiac defibrillator in situ     Bilateral primary osteoarthritis of knee     Cervical spondylosis without myelopathy     CHF (congestive heart failure)     Chronic ischemic heart disease     Chronic kidney disease (CKD), stage III (moderate)     Chronic pain syndrome     COPD (chronic obstructive pulmonary disease)     Coronary arteriosclerosis     Depressive disorder     Esophageal dysphagia 2/18/2022    GERD (gastroesophageal reflux disease)     HH (hiatus hernia) 2/18/2022    Hypothyroidism     Low back pain     Lumbar radiculopathy     Melanoma 2019    scalp    Metabolic syndrome     Myocardial ischemia     Other long term (current) drug therapy     Radiculopathy, cervical region     Type 2 diabetes mellitus      Past Surgical History:   Procedure Laterality Date    BACK SURGERY      bilateral knee injection/ aspiration of joint/ bursa-large  Bilateral 4/4/2017 3/27/2017 3/20/2017    bilateral knee orthovisc injection    CARDIAC SURGERY      EPIDURAL STEROID INJECTION INTO CERVICAL SPINE  6/19/2019 5/17/2017 4/26/2017    C6-7 LONNIEDr Farah    EPIDURAL STEROID INJECTION INTO LUMBAR SPINE  03/15/2018    L4-5 Dr Gagan FLEMING    HYSTERECTOMY      INJECTION OF ANESTHETIC AGENT AROUND MEDIAL BRANCH NERVES INNERVATING LUMBAR FACET JOINT Bilateral 4/14/2022    Procedure: Block-nerve-medial branch-lumbar, bilateral L4 through S1;  Surgeon: Angie Farah MD;  Location: [facility];  Service: Pain Management;  Laterality: Bilateral;  pt aware at visit to be tested  4/12 patient getting covid test today    INJECTION OF FACET JOINT Bilateral 8/17/2017 7/19/2017    Bilateral C3-7 Dr Gagan MARROQUIN  "   INJECTION OF FACET JOINT Left 10/18/2018    left C3-7 FI, Dr Farah    INJECTION OF FACET JOINT Bilateral 8/22/2018 6/29/2018    bilateral L3-S1 FI, Dr Farah    RADIOFREQUENCY ABLATION Right 12/12/2018    Right C3-7 RF, Dr Farah    RADIOFREQUENCY ABLATION Left 01/9/2019 10/16/2017 10/7/2020    Left C3-7 RF, Dr Farah    RADIOFREQUENCY ABLATION Left 1/9/2019 10/16/2017    left C3-7 RF, Dr Farah    RADIOFREQUENCY THERMAL COAGULATION OF MEDIAL BRANCH OF POSTERIOR RAMUS OF CERVICAL SPINAL NERVE Left 8/5/2021    Procedure: RADIOFREQUENCY THERMAL COAGULATION, NERVE, SPINAL, CERVICAL, POSTERIOR RAMUS, MEDIAL BRANCH, Left C3-4,4-5  only 2 levels resubmitted dt denied all levels on 8-3-2021;  Surgeon: Angie Farah MD;  Location: [facility];  Service: Pain Management;  Laterality: Left;    RADIOFREQUENCY THERMAL COAGULATION OF MEDIAL BRANCH OF POSTERIOR RAMUS OF CERVICAL SPINAL NERVE Right 8/24/2021    Procedure: RADIOFREQUENCY THERMAL COAGULATION, NERVE, SPINAL, CERVICAL, POSTERIOR RAMUS, MEDIAL BRANCH RIGHT C3-C5 RFTC;  Surgeon: Angie Farah MD;  Location: [facility];  Service: Pain Management;  Laterality: Right;  HAD VAC WILL BRING CARD    TRANSFORAMINAL EPIDURAL INJECTION OF STEROID Right 5/30/2018 5/2/2018    Right L4-5 TFESI, Dr Farah     Social History     Socioeconomic History    Marital status:    Tobacco Use    Smoking status: Every Day     Packs/day: 0.50     Years: 40.00     Pack years: 20.00     Types: Cigarettes    Smokeless tobacco: Never   Substance and Sexual Activity    Alcohol use: Not Currently    Drug use: Never     Family History   Problem Relation Age of Onset    Hypertension Mother     Diabetes Mellitus Mother      Review of patient's allergies indicates:  No Known Allergies     Objective:  Vitals:    11/22/22 1015   BP: (!) 102/59   Pulse: 96   Resp: 16   Weight: 65.3 kg (144 lb)   Height: 5' 3" (1.6 m)   PainSc: 0-No pain         Physical Exam  Vitals and " nursing note reviewed. Exam conducted with a chaperone present.   Constitutional:       General: She is awake. She is not in acute distress.     Appearance: Normal appearance. She is not ill-appearing or toxic-appearing.   HENT:      Head: Normocephalic and atraumatic.      Nose: Nose normal.      Mouth/Throat:      Mouth: Mucous membranes are moist.      Pharynx: Oropharynx is clear.   Eyes:      Conjunctiva/sclera: Conjunctivae normal.      Pupils: Pupils are equal, round, and reactive to light.   Pulmonary:      Effort: Pulmonary effort is normal. No respiratory distress.   Abdominal:      Palpations: Abdomen is soft.      Tenderness: There is no guarding.   Musculoskeletal:         General: Normal range of motion.      Cervical back: Normal range of motion and neck supple. Tenderness present. No rigidity.      Thoracic back: Tenderness present.      Lumbar back: Tenderness present.   Skin:     General: Skin is warm and dry.      Coloration: Skin is not jaundiced or pale.   Neurological:      General: No focal deficit present.      Mental Status: She is alert and oriented to person, place, and time. Mental status is at baseline.      Cranial Nerves: No cranial nerve deficit (II-XII).      Gait: Gait abnormal.   Psychiatric:         Mood and Affect: Mood normal.         Behavior: Behavior normal. Behavior is cooperative.         Thought Content: Thought content normal.         FL Fluoro for Pain Management  See OP Notes for results.     IMPRESSION: See OP Notes for results.     This procedure was auto-finalized by: Virtual Radiologist       Admission on 11/08/2022, Discharged on 11/08/2022   Component Date Value Ref Range Status    POC Glucose 11/08/2022 108 (H)  70 - 105 mg/dL Final   Hospital Outpatient Visit on 09/28/2022   Component Date Value Ref Range Status    POC Glucose 09/28/2022 117 (A)  70 - 110 MG/DL Final    POC Glucose 09/28/2022 119 (H)  70 - 105 mg/dL Final    Case Report 09/28/2022    Final                     Value:Surgical Pathology                                Case: [id number]                                   Authorizing Provider:  Michael Long MD      Collected:           09/28/2022 02:13 PM          Ordering Location:     [facility] - Endoscopy       Received:            09/28/2022 02:57 PM          Pathologist:           Rene Alegre MD                                                            Specimen:    Stomach, A- Stomach Bx                                                                     Final Diagnosis 09/28/2022    Final                    Value:This result contains rich text formatting which cannot be displayed here.    Gross Description 09/28/2022    Final                    Value:This result contains rich text formatting which cannot be displayed here.    Microscopic Description 09/28/2022    Final                    Value:This result contains rich text formatting which cannot be displayed here.    Laboratory Notes 09/28/2022    Final                    Value:This result contains rich text formatting which cannot be displayed here.   Hospital Outpatient Visit on 08/30/2022   Component Date Value Ref Range Status    PT 08/30/2022 12.8  11.7 - 14.7 seconds Final    INR 08/30/2022 1.00  <=3.30 Final    PTT 08/30/2022 28.8  25.2 - 37.3 seconds Final    WBC 08/30/2022 9.79  4.50 - 11.00 K/uL Final    RBC 08/30/2022 3.87 (L)  4.20 - 5.40 M/uL Final    Hemoglobin 08/30/2022 11.9 (L)  12.0 - 16.0 g/dL Final    Hematocrit 08/30/2022 36.2 (L)  38.0 - 47.0 % Final    MCV 08/30/2022 93.5  80.0 - 96.0 fL Final    MCH 08/30/2022 30.7  27.0 - 31.0 pg Final    MCHC 08/30/2022 32.9  32.0 - 36.0 g/dL Final    RDW 08/30/2022 12.3  11.5 - 14.5 % Final    Platelet Count 08/30/2022 219  150 - 400 K/uL Final    MPV 08/30/2022 10.5  9.4 - 12.4 fL Final    Neutrophils % 08/30/2022 59.3  53.0 - 65.0 % Final    Lymphocytes % 08/30/2022 29.7  27.0 - 41.0 % Final    Monocytes % 08/30/2022 6.3 (H)  2.0 - 6.0 %  Final    Eosinophils % 08/30/2022 3.7  1.0 - 4.0 % Final    Basophils % 08/30/2022 0.7  0.0 - 1.0 % Final    Immature Granulocytes % 08/30/2022 0.3  0.0 - 0.4 % Final    nRBC, Auto 08/30/2022 0.0  <=0.0 % Final    Neutrophils, Abs 08/30/2022 5.80  1.80 - 7.70 K/uL Final    Lymphocytes, Absolute 08/30/2022 2.91  1.00 - 4.80 K/uL Final    Monocytes, Absolute 08/30/2022 0.62  0.00 - 0.80 K/uL Final    Eosinophils, Absolute 08/30/2022 0.36  0.00 - 0.50 K/uL Final    Basophils, Absolute 08/30/2022 0.07  0.00 - 0.20 K/uL Final    Immature Granulocytes, Absolute 08/30/2022 0.03  0.00 - 0.04 K/uL Final    nRBC, Absolute 08/30/2022 0.00  <=0.00 x10e3/uL Final    Diff Type 08/30/2022 Auto   Final   Lab Visit on 08/23/2022   Component Date Value Ref Range Status    TSH 08/23/2022 5.360 (H)  0.358 - 3.740 uIU/mL Final    Free T3 08/23/2022 2.02 (L)  2.18 - 3.98 pg/mL Final   Office Visit on 08/17/2022   Component Date Value Ref Range Status    POC Amphetamines 08/17/2022 Negative  Negative, Inconclusive Final    POC Barbiturates 08/17/2022 Negative  Negative, Inconclusive Final    POC Benzodiazepines 08/17/2022 Negative  Negative, Inconclusive Final    POC Cocaine 08/17/2022 Negative  Negative, Inconclusive Final    POC THC 08/17/2022 Negative  Negative, Inconclusive Final    POC Methadone 08/17/2022 Negative  Negative, Inconclusive Final    POC Methamphetamine 08/17/2022 Negative  Negative, Inconclusive Final    POC Opiates 08/17/2022 Presumptive Positive (A)  Negative, Inconclusive Final    POC Oxycodone 08/17/2022 Negative  Negative, Inconclusive Final    POC Phencyclidine 08/17/2022 Negative  Negative, Inconclusive Final    POC Methylenedioxymethamphetamine * 08/17/2022 Negative  Negative, Inconclusive Final    POC Tricyclic Antidepressants 08/17/2022 Negative  Negative, Inconclusive Final    POC Buprenorphine 08/17/2022 Negative   Final     Acceptable 08/17/2022 Yes   Final    POC Temperature (Urine)  08/17/2022 92   Final   Office Visit on 08/03/2022   Component Date Value Ref Range Status    Creatinine, Urine 10/27/2022 59  28 - 219 mg/dL Final    Microalbumin 10/27/2022 2.0  0.0 - 2.8 mg/dL Final    Microalbumin/Creatinine Ratio 10/27/2022 33.9 (H)  0.0 - 30.0 mg/g Final   Office Visit on 07/26/2022   Component Date Value Ref Range Status    POC Amphetamines 07/26/2022 Negative  Negative, Inconclusive Final    POC Barbiturates 07/26/2022 Negative  Negative, Inconclusive Final    POC Benzodiazepines 07/26/2022 Negative  Negative, Inconclusive Final    POC Cocaine 07/26/2022 Negative  Negative, Inconclusive Final    POC THC 07/26/2022 Negative  Negative, Inconclusive Final    POC Methadone 07/26/2022 Negative  Negative, Inconclusive Final    POC Methamphetamine 07/26/2022 Negative  Negative, Inconclusive Final    POC Opiates 07/26/2022 Presumptive Positive (A)  Negative, Inconclusive Final    POC Oxycodone 07/26/2022 Negative  Negative, Inconclusive Final    POC Phencyclidine 07/26/2022 Negative  Negative, Inconclusive Final    POC Methylenedioxymethamphetamine * 07/26/2022 Negative  Negative, Inconclusive Final    POC Tricyclic Antidepressants 07/26/2022 Negative  Negative, Inconclusive Final    POC Buprenorphine 07/26/2022 Negative   Final     Acceptable 07/26/2022 Yes   Final    POC Temperature (Urine) 07/26/2022 94   Final   Lab Visit on 07/21/2022   Component Date Value Ref Range Status    Sodium 07/21/2022 137  136 - 145 mmol/L Final    Potassium 07/21/2022 3.9  3.5 - 5.1 mmol/L Final    Chloride 07/21/2022 101  98 - 107 mmol/L Final    CO2 07/21/2022 26  21 - 32 mmol/L Final    Anion Gap 07/21/2022 14  7 - 16 mmol/L Final    Glucose 07/21/2022 105  74 - 106 mg/dL Final    BUN 07/21/2022 20 (H)  7 - 18 mg/dL Final    Creatinine 07/21/2022 1.18 (H)  0.55 - 1.02 mg/dL Final    BUN/Creatinine Ratio 07/21/2022 17  6 - 20 Final    Calcium 07/21/2022 9.7  8.5 - 10.1 mg/dL Final    Total Protein  07/21/2022 7.4  6.4 - 8.2 g/dL Final    Albumin 07/21/2022 4.0  3.5 - 5.0 g/dL Final    Globulin 07/21/2022 3.4  2.0 - 4.0 g/dL Final    A/G Ratio 07/21/2022 1.2   Final    Bilirubin, Total 07/21/2022 0.4  0.0 - 1.2 mg/dL Final    Alk Phos 07/21/2022 100  55 - 142 U/L Final    ALT 07/21/2022 15  13 - 56 U/L Final    AST 07/21/2022 11 (L)  15 - 37 U/L Final    eGFR 07/21/2022 47 (L)  >=60 mL/min/1.73m² Final    Triglycerides 07/21/2022 223 (H)  35 - 150 mg/dL Final    Cholesterol 07/21/2022 136  0 - 200 mg/dL Final    HDL Cholesterol 07/21/2022 39 (L)  40 - 60 mg/dL Final    Cholesterol/HDL Ratio (Risk Factor) 07/21/2022 3.5   Final    Non-HDL 07/21/2022 97  mg/dL Final    LDL Calculated 07/21/2022 52  mg/dL Final    LDL/HDL 07/21/2022 1.3   Final    VLDL 07/21/2022 45  mg/dL Final    Hemoglobin A1C 07/21/2022 6.5  4.5 - 6.6 % Final    Estimated Average Glucose 07/21/2022 130  mg/dL Final    TSH 07/21/2022 13.400 (H)  0.358 - 3.740 uIU/mL Final    WBC 07/21/2022 8.56  4.50 - 11.00 K/uL Final    RBC 07/21/2022 5.02  4.20 - 5.40 M/uL Final    Hemoglobin 07/21/2022 15.7  12.0 - 16.0 g/dL Final    Hematocrit 07/21/2022 45.9  38.0 - 47.0 % Final    MCV 07/21/2022 91.4  80.0 - 96.0 fL Final    MCH 07/21/2022 31.3 (H)  27.0 - 31.0 pg Final    MCHC 07/21/2022 34.2  32.0 - 36.0 g/dL Final    RDW 07/21/2022 13.0  11.5 - 14.5 % Final    Platelet Count 07/21/2022 243  150 - 400 K/uL Final    MPV 07/21/2022 11.1  9.4 - 12.4 fL Final    Neutrophils % 07/21/2022 43.2 (L)  53.0 - 65.0 % Final    Lymphocytes % 07/21/2022 45.8 (H)  27.0 - 41.0 % Final    Monocytes % 07/21/2022 6.2 (H)  2.0 - 6.0 % Final    Eosinophils % 07/21/2022 3.6  1.0 - 4.0 % Final    Basophils % 07/21/2022 1.1 (H)  0.0 - 1.0 % Final    Immature Granulocytes % 07/21/2022 0.1  0.0 - 0.4 % Final    nRBC, Auto 07/21/2022 0.0  <=0.0 % Final    Neutrophils, Abs 07/21/2022 3.70  1.80 - 7.70 K/uL Final    Lymphocytes, Absolute 07/21/2022 3.92  1.00 - 4.80 K/uL Final     Monocytes, Absolute 2022 0.53  0.00 - 0.80 K/uL Final    Eosinophils, Absolute 2022 0.31  0.00 - 0.50 K/uL Final    Basophils, Absolute 2022 0.09  0.00 - 0.20 K/uL Final    Immature Granulocytes, Absolute 2022 0.01  0.00 - 0.04 K/uL Final    nRBC, Absolute 2022 0.00  <=0.00 x10e3/uL Final    Diff Type 2022 Auto   Final         Orders Placed This Encounter   Procedures    CT Thoracic Spine Without Contrast     Standing Status:   Future     Standing Expiration Date:   2023     Order Specific Question:   May the Radiologist modify the order per protocol to meet the clinical needs of the patient?     Answer:   Yes    POCT Urine Drug Screen Presump     Interpretive Information:     Negative:  No drug detected at the cut off level.   Positive:  This result represents presumptive positive for the   tested drug, other substances may yield a positive response other   than the analyte of interest. This result should be utilized for   diagnostic purpose only. Confirmation testing will be performed upon physician request only.            Requested Prescriptions     Signed Prescriptions Disp Refills    gabapentin (NEURONTIN) 100 MG capsule 270 capsule 2     Sig: Take 1 capsule (100 mg total) by mouth every 8 (eight) hours.    naloxone (NARCAN) 4 mg/actuation Spry 1 each 0     Si spray (4 mg total) by Nasal route once. for 1 dose    HYDROcodone-acetaminophen (NORCO) 7.5-325 mg per tablet 90 tablet 0     Sig: Take 1 tablet by mouth every 8 (eight) hours as needed for Pain.       Assessment:     1. Lumbar radiculopathy    2. 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 Chronic pain of right knee    5. Encounter for long-term (current) use of other medications    6. Thoracic radiculopathy    7. Pain in thoracic spine         A's of Opioid Risk Assessment  Activity:Patient can perform ADL.   Analgesia:Patients  pain is partially controlled by current medication. Patient has tried OTC medications such as Tylenol and Ibuprofen with out relief.   Adverse Effects: Patient denies constipation or sedation.  Aberrant Behavior:  reviewed with no aberrant drug seeking/taking behavior.  Overdose reversal drug naloxone discussed    Drug screen reviewed      Plan:    Narcan November 2022    Cardiac defibrillator cannot have MRI    History lumbar surgery 2005, Dr. Woods [facility]    Follow-up after bilateral genicular nerve block # 1, November 8, 2022, she states she would 80% relief maintaining 60% relief with time, the procedure did help improve her level of function    X-ray bilateral knee [facility] September 4, 2020 degenerative changes noted no fracture noted    Complaining mid thoracic pain bra line area worse with flexion extension rotation anterior chest wall radiation pain numbness and tingling radicular in nature     Requesting to increase medication to [place] 7.5     Patient has been compliant drug screens office visit     Increase [place] 7.51 p.o. q.8 hours    Continue home exercise program as directed    Patient can not have MRI    Will order CT thoracic spine no contrast    Follow-up 1 month discuss options for thoracic discomfort     Dr. Farah November 2023    Bring original prescription medication bottles/container/box with labels to each visit    Pill count    Physical therapy    Massage therapy declines

## 2022-11-21 DIAGNOSIS — I10 HYPERTENSION, UNSPECIFIED TYPE: ICD-10-CM

## 2022-11-21 RX ORDER — MONTELUKAST SODIUM 10 MG/1
10 TABLET ORAL NIGHTLY
Qty: 90 TABLET | Refills: 1 | Status: SHIPPED | OUTPATIENT
Start: 2022-11-21 | End: 2023-01-18 | Stop reason: SDUPTHER

## 2022-11-21 RX ORDER — LISINOPRIL 5 MG/1
5 TABLET ORAL DAILY
Qty: 90 TABLET | Refills: 1 | Status: SHIPPED | OUTPATIENT
Start: 2022-11-21 | End: 2023-01-18 | Stop reason: SDUPTHER

## 2022-11-21 RX ORDER — CARVEDILOL 3.12 MG/1
3.12 TABLET ORAL 2 TIMES DAILY WITH MEALS
Qty: 90 TABLET | Refills: 1 | Status: SHIPPED | OUTPATIENT
Start: 2022-11-21 | End: 2023-02-03

## 2022-11-21 NOTE — TELEPHONE ENCOUNTER
----- Message from Helen Viramontes sent at 11/21/2022  9:21 AM CST -----  Regarding: Med Refill  Contact: Patient  Pt needs: montelukast (SINGULAIR) 10 mg tablet, carvediloL (COREG) 3.125 MG tablet, and lisinopriL (PRINIVIL,ZESTRIL) 5 MG tablet    Pharmacy: [facility] - [place], [place] - [address]  [address] [facility] [address]  Phone: [phone] Fax: [phone]  Hours: Not open 24 hours        Phone #:  [phone]

## 2022-11-22 ENCOUNTER — OFFICE VISIT (OUTPATIENT)
Dept: PAIN MEDICINE | Facility: CLINIC | Age: 82
End: 2022-11-22
Payer: MEDICARE

## 2022-11-22 VITALS
RESPIRATION RATE: 16 BRPM | BODY MASS INDEX: 25.52 KG/M2 | HEIGHT: 63 IN | DIASTOLIC BLOOD PRESSURE: 59 MMHG | HEART RATE: 96 BPM | SYSTOLIC BLOOD PRESSURE: 102 MMHG | WEIGHT: 144 LBS

## 2022-11-22 DIAGNOSIS — M25.561 CHRONIC PAIN OF RIGHT KNEE: ICD-10-CM

## 2022-11-22 DIAGNOSIS — G89.29 CHRONIC PAIN OF RIGHT KNEE: ICD-10-CM

## 2022-11-22 DIAGNOSIS — Z79.899 ENCOUNTER FOR LONG-TERM (CURRENT) USE OF OTHER MEDICATIONS: ICD-10-CM

## 2022-11-22 DIAGNOSIS — M54.16 LUMBAR RADICULOPATHY: Primary | Chronic | ICD-10-CM

## 2022-11-22 DIAGNOSIS — M54.6 PAIN IN THORACIC SPINE: ICD-10-CM

## 2022-11-22 DIAGNOSIS — M47.812 SPONDYLOSIS OF CERVICAL JOINT WITHOUT MYELOPATHY: Chronic | ICD-10-CM

## 2022-11-22 DIAGNOSIS — M54.14 THORACIC RADICULOPATHY: Chronic | ICD-10-CM

## 2022-11-22 DIAGNOSIS — M89.49 OSTEOARTHROSIS MULTIPLE SITES, NOT SPECIFIED AS GENERALIZED: Chronic | ICD-10-CM

## 2022-11-22 PROCEDURE — 3078F DIAST BP <80 MM HG: CPT | Mod: CPTII,,, | Performed by: PHYSICIAN ASSISTANT

## 2022-11-22 PROCEDURE — 1159F MED LIST DOCD IN RCRD: CPT | Mod: CPTII,,, | Performed by: PHYSICIAN ASSISTANT

## 2022-11-22 PROCEDURE — 3074F SYST BP LT 130 MM HG: CPT | Mod: CPTII,,, | Performed by: PHYSICIAN ASSISTANT

## 2022-11-22 PROCEDURE — 3078F PR MOST RECENT DIASTOLIC BLOOD PRESSURE < 80 MM HG: ICD-10-PCS | Mod: CPTII,,, | Performed by: PHYSICIAN ASSISTANT

## 2022-11-22 PROCEDURE — 3074F PR MOST RECENT SYSTOLIC BLOOD PRESSURE < 130 MM HG: ICD-10-PCS | Mod: CPTII,,, | Performed by: PHYSICIAN ASSISTANT

## 2022-11-22 PROCEDURE — 99214 PR OFFICE/OUTPT VISIT, EST, LEVL IV, 30-39 MIN: ICD-10-PCS | Mod: S$PBB,,, | Performed by: PHYSICIAN ASSISTANT

## 2022-11-22 PROCEDURE — 99214 OFFICE O/P EST MOD 30 MIN: CPT | Mod: S$PBB,,, | Performed by: PHYSICIAN ASSISTANT

## 2022-11-22 PROCEDURE — 1126F AMNT PAIN NOTED NONE PRSNT: CPT | Mod: CPTII,,, | Performed by: PHYSICIAN ASSISTANT

## 2022-11-22 PROCEDURE — 1101F PR PT FALLS ASSESS DOC 0-1 FALLS W/OUT INJ PAST YR: ICD-10-PCS | Mod: CPTII,,, | Performed by: PHYSICIAN ASSISTANT

## 2022-11-22 PROCEDURE — 80305 DRUG TEST PRSMV DIR OPT OBS: CPT | Mod: PBBFAC | Performed by: PHYSICIAN ASSISTANT

## 2022-11-22 PROCEDURE — 3288F PR FALLS RISK ASSESSMENT DOCUMENTED: ICD-10-PCS | Mod: CPTII,,, | Performed by: PHYSICIAN ASSISTANT

## 2022-11-22 PROCEDURE — 99215 OFFICE O/P EST HI 40 MIN: CPT | Mod: PBBFAC | Performed by: PHYSICIAN ASSISTANT

## 2022-11-22 PROCEDURE — 3288F FALL RISK ASSESSMENT DOCD: CPT | Mod: CPTII,,, | Performed by: PHYSICIAN ASSISTANT

## 2022-11-22 PROCEDURE — 1101F PT FALLS ASSESS-DOCD LE1/YR: CPT | Mod: CPTII,,, | Performed by: PHYSICIAN ASSISTANT

## 2022-11-22 PROCEDURE — 1159F PR MEDICATION LIST DOCUMENTED IN MEDICAL RECORD: ICD-10-PCS | Mod: CPTII,,, | Performed by: PHYSICIAN ASSISTANT

## 2022-11-22 PROCEDURE — 1126F PR PAIN SEVERITY QUANTIFIED, NO PAIN PRESENT: ICD-10-PCS | Mod: CPTII,,, | Performed by: PHYSICIAN ASSISTANT

## 2022-11-22 RX ORDER — HYDROCODONE BITARTRATE AND ACETAMINOPHEN 5; 325 MG/1; MG/1
1 TABLET ORAL EVERY 8 HOURS
Qty: 90 TABLET | Refills: 0 | Status: SHIPPED | OUTPATIENT
Start: 2022-11-23 | End: 2022-11-22

## 2022-11-22 RX ORDER — GABAPENTIN 100 MG/1
100 CAPSULE ORAL EVERY 8 HOURS
Qty: 270 CAPSULE | Refills: 2 | Status: SHIPPED | OUTPATIENT
Start: 2022-11-22 | End: 2023-02-21 | Stop reason: SDUPTHER

## 2022-11-22 RX ORDER — NALOXONE HYDROCHLORIDE 4 MG/.1ML
1 SPRAY NASAL ONCE
Qty: 1 EACH | Refills: 0 | Status: SHIPPED | OUTPATIENT
Start: 2022-11-22 | End: 2022-11-22

## 2022-11-22 RX ORDER — HYDROCODONE BITARTRATE AND ACETAMINOPHEN 5; 325 MG/1; MG/1
1 TABLET ORAL EVERY 8 HOURS
Qty: 90 TABLET | Refills: 0 | Status: CANCELLED | OUTPATIENT
Start: 2022-11-22 | End: 2022-12-22

## 2022-11-22 RX ORDER — HYDROCODONE BITARTRATE AND ACETAMINOPHEN 7.5; 325 MG/1; MG/1
1 TABLET ORAL EVERY 8 HOURS PRN
Qty: 90 TABLET | Refills: 0 | Status: SHIPPED | OUTPATIENT
Start: 2022-11-23 | End: 2022-12-20

## 2022-12-09 ENCOUNTER — HOSPITAL ENCOUNTER (OUTPATIENT)
Dept: RADIOLOGY | Facility: HOSPITAL | Age: 82
Discharge: HOME | End: 2022-12-09
Attending: PHYSICIAN ASSISTANT
Payer: MEDICARE

## 2022-12-09 DIAGNOSIS — M54.6 PAIN IN THORACIC SPINE: ICD-10-CM

## 2022-12-09 PROCEDURE — 72128 CT THORACIC SPINE WITHOUT CONTRAST: ICD-10-PCS | Mod: 26,,, | Performed by: RADIOLOGY

## 2022-12-09 PROCEDURE — 72128 CT CHEST SPINE W/O DYE: CPT | Mod: TC

## 2022-12-09 PROCEDURE — 72128 CT CHEST SPINE W/O DYE: CPT | Mod: 26,,, | Performed by: RADIOLOGY

## 2022-12-20 RX ORDER — HYDROCODONE BITARTRATE AND ACETAMINOPHEN 7.5; 325 MG/1; MG/1
1 TABLET ORAL EVERY 8 HOURS PRN
Qty: 90 TABLET | Refills: 0 | Status: SHIPPED | OUTPATIENT
Start: 2022-12-23 | End: 2023-02-21 | Stop reason: SDUPTHER

## 2022-12-20 RX ORDER — HYDROCODONE BITARTRATE AND ACETAMINOPHEN 7.5; 325 MG/1; MG/1
1 TABLET ORAL EVERY 8 HOURS PRN
Qty: 90 TABLET | Refills: 0 | Status: SHIPPED | OUTPATIENT
Start: 2023-01-21 | End: 2023-02-21 | Stop reason: SDUPTHER

## 2022-12-29 ENCOUNTER — TELEPHONE (OUTPATIENT)
Dept: FAMILY MEDICINE | Facility: CLINIC | Age: 82
End: 2022-12-29
Payer: MEDICARE

## 2022-12-29 ENCOUNTER — OFFICE VISIT (OUTPATIENT)
Dept: FAMILY MEDICINE | Facility: CLINIC | Age: 82
End: 2022-12-29
Payer: MEDICARE

## 2022-12-29 VITALS
SYSTOLIC BLOOD PRESSURE: 100 MMHG | OXYGEN SATURATION: 98 % | RESPIRATION RATE: 17 BRPM | TEMPERATURE: 99 F | HEART RATE: 61 BPM | HEIGHT: 63 IN | DIASTOLIC BLOOD PRESSURE: 65 MMHG | WEIGHT: 142 LBS | BODY MASS INDEX: 25.16 KG/M2

## 2022-12-29 DIAGNOSIS — J40 BRONCHITIS: Primary | ICD-10-CM

## 2022-12-29 DIAGNOSIS — R05.9 COUGH, UNSPECIFIED TYPE: ICD-10-CM

## 2022-12-29 DIAGNOSIS — J30.9 ALLERGIC RHINITIS, UNSPECIFIED SEASONALITY, UNSPECIFIED TRIGGER: ICD-10-CM

## 2022-12-29 PROCEDURE — 3288F PR FALLS RISK ASSESSMENT DOCUMENTED: ICD-10-PCS | Mod: ,,, | Performed by: FAMILY MEDICINE

## 2022-12-29 PROCEDURE — 3074F SYST BP LT 130 MM HG: CPT | Mod: ,,, | Performed by: FAMILY MEDICINE

## 2022-12-29 PROCEDURE — 1159F PR MEDICATION LIST DOCUMENTED IN MEDICAL RECORD: ICD-10-PCS | Mod: ,,, | Performed by: FAMILY MEDICINE

## 2022-12-29 PROCEDURE — 99213 PR OFFICE/OUTPT VISIT, EST, LEVL III, 20-29 MIN: ICD-10-PCS | Mod: 25,,, | Performed by: FAMILY MEDICINE

## 2022-12-29 PROCEDURE — 3078F DIAST BP <80 MM HG: CPT | Mod: ,,, | Performed by: FAMILY MEDICINE

## 2022-12-29 PROCEDURE — 3078F PR MOST RECENT DIASTOLIC BLOOD PRESSURE < 80 MM HG: ICD-10-PCS | Mod: ,,, | Performed by: FAMILY MEDICINE

## 2022-12-29 PROCEDURE — 96372 THER/PROPH/DIAG INJ SC/IM: CPT | Mod: ,,, | Performed by: FAMILY MEDICINE

## 2022-12-29 PROCEDURE — 1101F PR PT FALLS ASSESS DOC 0-1 FALLS W/OUT INJ PAST YR: ICD-10-PCS | Mod: ,,, | Performed by: FAMILY MEDICINE

## 2022-12-29 PROCEDURE — 3288F FALL RISK ASSESSMENT DOCD: CPT | Mod: ,,, | Performed by: FAMILY MEDICINE

## 2022-12-29 PROCEDURE — 1101F PT FALLS ASSESS-DOCD LE1/YR: CPT | Mod: ,,, | Performed by: FAMILY MEDICINE

## 2022-12-29 PROCEDURE — 99213 OFFICE O/P EST LOW 20 MIN: CPT | Mod: 25,,, | Performed by: FAMILY MEDICINE

## 2022-12-29 PROCEDURE — 96372 PR INJECTION,THERAP/PROPH/DIAG2ST, IM OR SUBCUT: ICD-10-PCS | Mod: ,,, | Performed by: FAMILY MEDICINE

## 2022-12-29 PROCEDURE — 1160F RVW MEDS BY RX/DR IN RCRD: CPT | Mod: ,,, | Performed by: FAMILY MEDICINE

## 2022-12-29 PROCEDURE — 1159F MED LIST DOCD IN RCRD: CPT | Mod: ,,, | Performed by: FAMILY MEDICINE

## 2022-12-29 PROCEDURE — 3074F PR MOST RECENT SYSTOLIC BLOOD PRESSURE < 130 MM HG: ICD-10-PCS | Mod: ,,, | Performed by: FAMILY MEDICINE

## 2022-12-29 PROCEDURE — 1160F PR REVIEW ALL MEDS BY PRESCRIBER/CLIN PHARMACIST DOCUMENTED: ICD-10-PCS | Mod: ,,, | Performed by: FAMILY MEDICINE

## 2022-12-29 RX ORDER — AZITHROMYCIN 250 MG/1
TABLET, FILM COATED ORAL
Qty: 6 TABLET | Refills: 0 | Status: SHIPPED | OUTPATIENT
Start: 2022-12-29 | End: 2022-12-29

## 2022-12-29 RX ORDER — AZITHROMYCIN 250 MG/1
TABLET, FILM COATED ORAL
Qty: 6 TABLET | Refills: 0 | Status: SHIPPED | OUTPATIENT
Start: 2022-12-29 | End: 2023-01-03

## 2022-12-29 RX ORDER — CEFTRIAXONE 1 G/1
1 INJECTION, POWDER, FOR SOLUTION INTRAMUSCULAR; INTRAVENOUS
Status: COMPLETED | OUTPATIENT
Start: 2022-12-29 | End: 2022-12-29

## 2022-12-29 RX ORDER — BLOOD SUGAR DIAGNOSTIC
STRIP MISCELLANEOUS
COMMUNITY
End: 2022-12-29 | Stop reason: SDUPTHER

## 2022-12-29 RX ORDER — BLOOD SUGAR DIAGNOSTIC
STRIP MISCELLANEOUS
Qty: 100 EACH | Refills: 2 | Status: SHIPPED | OUTPATIENT
Start: 2022-12-29 | End: 2023-06-14 | Stop reason: SDUPTHER

## 2022-12-29 RX ORDER — FLUTICASONE PROPIONATE 50 MCG
2 SPRAY, SUSPENSION (ML) NASAL DAILY
Qty: 48 G | Refills: 2 | Status: SHIPPED | OUTPATIENT
Start: 2022-12-29 | End: 2023-01-18 | Stop reason: SDUPTHER

## 2022-12-29 RX ADMIN — CEFTRIAXONE 1 G: 1 INJECTION, POWDER, FOR SOLUTION INTRAMUSCULAR; INTRAVENOUS at 12:12

## 2022-12-29 NOTE — TELEPHONE ENCOUNTER
----- Message from Hollis Rice MD sent at 12/29/2022  1:03 PM CST -----  Patient has no evidence of infiltrate.

## 2022-12-29 NOTE — PROGRESS NOTES
Sharee Elizondo is a 82 y.o. female seen today for a 2 month history of cough now worsening with nasal congestion postnasal drainage and mild malaise.  She has so far been afebrile chills or nausea vomiting.  Patient is also taking lisinopril which is known to cause coughing    Past Medical History:   Diagnosis Date    Acute superficial gastritis without hemorrhage 2/18/2022    Atrial fibrillation     Automatic implantable cardiac defibrillator in situ     Bilateral primary osteoarthritis of knee     Cervical spondylosis without myelopathy     CHF (congestive heart failure)     Chronic ischemic heart disease     Chronic kidney disease (CKD), stage III (moderate)     Chronic pain syndrome     COPD (chronic obstructive pulmonary disease)     Coronary arteriosclerosis     Depressive disorder     Esophageal dysphagia 2/18/2022    GERD (gastroesophageal reflux disease)     HH (hiatus hernia) 2/18/2022    Hypothyroidism     Low back pain     Lumbar radiculopathy     Melanoma 2019    scalp    Metabolic syndrome     Myocardial ischemia     Other long term (current) drug therapy     Radiculopathy, cervical region     Type 2 diabetes mellitus      Family History   Problem Relation Age of Onset    Hypertension Mother     Diabetes Mellitus Mother      Current Outpatient Medications on File Prior to Visit   Medication Sig Dispense Refill    amitriptyline (ELAVIL) 25 MG tablet Take 1 tablet (25 mg total) by mouth nightly as needed for Insomnia. 90 tablet 1    ascorbic acid, vitamin C, (VITAMIN C) 500 MG tablet Take 500 mg by mouth once daily.      blood sugar diagnostic (BLOOD GLUCOSE TEST) Strp Use with glucometer to check blood glucose twice daily for diabetes, E11.9 100 strip 3    blood-glucose meter Misc Use to check blood glucose twice daily for diabetes, E11.9 1 each 0    carvediloL (COREG) 3.125 MG tablet Take 1 tablet (3.125 mg total) by mouth 2 (two) times daily with meals. 90 tablet 1    cholecalciferol, vitamin D3, (VITAMIN  D3) 25 mcg (1,000 unit) capsule Take 1,000 Units by mouth once daily.      dulaglutide (TRULICITY) 1.5 mg/0.5 mL pen injector INJECT 1.5 MG INTO THE SKIN EVERY 7 DAYS. 12 pen 1    esomeprazole (NEXIUM) 40 MG capsule Take 1 capsule (40 mg total) by mouth once daily. 90 capsule 1    furosemide (LASIX) 40 MG tablet Take 1 tablet (40 mg total) by mouth once daily. 90 tablet 1    gabapentin (NEURONTIN) 100 MG capsule Take 1 capsule (100 mg total) by mouth every 8 (eight) hours. 270 capsule 2    HYDROcodone-acetaminophen (NORCO) 7.5-325 mg per tablet Take 1 tablet by mouth every 8 (eight) hours as needed for Pain. 90 tablet 0    [START ON 1/21/2023] HYDROcodone-acetaminophen (NORCO) 7.5-325 mg per tablet Take 1 tablet by mouth every 8 (eight) hours as needed for Pain. 90 tablet 0    icosapent ethyL (VASCEPA) 0.5 gram Cap Take 1 capsule by mouth 2 (two) times a day. 180 capsule 1    insulin degludec (TRESIBA FLEXTOUCH U-100) 100 unit/mL (3 mL) insulin pen Inject 10 units into skin daily 4 pen 1    lancets 33 gauge Misc Use to check blood glucose twice daily for diabetes, E11.9 100 each 3    levothyroxine (SYNTHROID) 75 MCG tablet Take 1 tablet (75 mcg total) by mouth once daily. 90 tablet 1    lisinopriL (PRINIVIL,ZESTRIL) 5 MG tablet Take 1 tablet (5 mg total) by mouth once daily. 90 tablet 1    methocarbamoL (ROBAXIN) 500 MG Tab Take 1 tablet (500 mg total) by mouth 2 (two) times daily. 180 tablet 1    montelukast (SINGULAIR) 10 mg tablet Take 1 tablet (10 mg total) by mouth every evening. 90 tablet 1    nitroGLYCERIN (NITROSTAT) 0.4 MG SL tablet Place 1 tablet (0.4 mg total) under the tongue every 5 (five) minutes as needed for Chest pain. 30 tablet 2    rosuvastatin (CRESTOR) 40 MG Tab Take 1 tablet (40 mg total) by mouth every evening. 90 tablet 1    sertraline (ZOLOFT) 50 MG tablet Take 1 tablet (50 mg total) by mouth once daily. 90 tablet 1    spironolactone (ALDACTONE) 25 MG tablet Take 1 tablet (25 mg total) by  "mouth once daily. 90 tablet 1    [DISCONTINUED] pen needle, diabetic 32 gauge x 1/4" Ndle by Misc.(Non-Drug; Combo Route) route.       No current facility-administered medications on file prior to visit.     Immunization History   Administered Date(s) Administered    COVID-19, MRNA, LN-S, PF (MODERNA FULL 0.5 ML DOSE) 03/10/2021, 04/07/2021    Influenza (FLUAD) - Quadrivalent - Adjuvanted - PF *Preferred* (65+) 10/27/2022    Influenza - Quadrivalent - High Dose - PF (65 years and older) 10/06/2021       Review of Systems   Constitutional:  Negative for fever, malaise/fatigue and weight loss.   HENT:  Positive for congestion.    Respiratory:  Positive for cough and sputum production. Negative for shortness of breath.    Cardiovascular:  Negative for chest pain and palpitations.   Gastrointestinal:  Negative for nausea and vomiting.   Psychiatric/Behavioral:  Negative for depression.       Vitals:    12/29/22 1124   BP: 100/65   Pulse: 61   Resp: 17   Temp: 98.8 °F (37.1 °C)       Physical Exam  Vitals reviewed.   Constitutional:       Appearance: Normal appearance.   HENT:      Head: Normocephalic.      Nose:      Right Turbinates: Swollen.      Left Turbinates: Swollen.      Comments: Patient has copious postnasal drainage.  Eyes:      Extraocular Movements: Extraocular movements intact.      Conjunctiva/sclera: Conjunctivae normal.      Pupils: Pupils are equal, round, and reactive to light.   Neck:      Thyroid: No thyroid mass or thyromegaly.   Cardiovascular:      Rate and Rhythm: Normal rate and regular rhythm.      Heart sounds: Normal heart sounds. No murmur heard.    No gallop.   Pulmonary:      Effort: Pulmonary effort is normal. No respiratory distress.      Breath sounds: Examination of the right-middle field reveals rhonchi. Examination of the left-middle field reveals rhonchi. Examination of the right-lower field reveals rhonchi. Examination of the left-lower field reveals rhonchi. Rhonchi present. No " "wheezing or rales.   Skin:     General: Skin is warm and dry.      Coloration: Skin is not jaundiced or pale.   Neurological:      Mental Status: She is alert.   Psychiatric:         Mood and Affect: Mood normal.         Behavior: Behavior normal.         Thought Content: Thought content normal.         Judgment: Judgment normal.        Assessment and Plan  Bronchitis  -     cefTRIAXone injection 1 g  -     azithromycin (Z-MARIE) 250 MG tablet; Take 2 tablets by mouth on day 1; Take 1 tablet by mouth on days 2-5  Dispense: 6 tablet; Refill: 0    Cough, unspecified type  -     X-Ray Chest PA And Lateral; Future; Expected date: 12/29/2022  -     fluticasone propionate (FLONASE) 50 mcg/actuation nasal spray; 2 sprays (100 mcg total) by Each Nostril route once daily.  Dispense: 48 g; Refill: 2    Allergic rhinitis, unspecified seasonality, unspecified trigger  -     fluticasone propionate (FLONASE) 50 mcg/actuation nasal spray; 2 sprays (100 mcg total) by Each Nostril route once daily.  Dispense: 48 g; Refill: 2    Other orders  -     pen needle, diabetic 32 gauge x 1/4" Ndle; Use daily to check blood glucose  Dispense: 100 each; Refill: 2                Her chest x-ray is pending and so far I see no obvious evidence of an infiltrate.        Return to clinic in 1-2 weeks and since her blood pressure is already low we decided to hold her lisinopril for the next week or so until her follow-up.    Health Maintenance Topics with due status: Not Due       Topic Last Completion Date    Lipid Panel 07/21/2022    Hemoglobin A1c 07/21/2022    DEXA Scan 08/29/2022    Diabetes Urine Screening 10/27/2022         "

## 2023-01-18 ENCOUNTER — OFFICE VISIT (OUTPATIENT)
Dept: FAMILY MEDICINE | Facility: CLINIC | Age: 83
End: 2023-01-18
Payer: MEDICARE

## 2023-01-18 VITALS
RESPIRATION RATE: 19 BRPM | HEIGHT: 63 IN | HEART RATE: 90 BPM | DIASTOLIC BLOOD PRESSURE: 68 MMHG | SYSTOLIC BLOOD PRESSURE: 110 MMHG | OXYGEN SATURATION: 95 % | WEIGHT: 140.19 LBS | BODY MASS INDEX: 24.84 KG/M2

## 2023-01-18 DIAGNOSIS — E03.9 HYPOTHYROIDISM, UNSPECIFIED TYPE: ICD-10-CM

## 2023-01-18 DIAGNOSIS — Z23 NEED FOR VACCINATION AGAINST STREPTOCOCCUS PNEUMONIAE: ICD-10-CM

## 2023-01-18 DIAGNOSIS — R05.9 COUGH, UNSPECIFIED TYPE: ICD-10-CM

## 2023-01-18 DIAGNOSIS — E78.1 HYPERTRIGLYCERIDEMIA: ICD-10-CM

## 2023-01-18 DIAGNOSIS — E11.9 TYPE 2 DIABETES MELLITUS WITHOUT COMPLICATION, WITHOUT LONG-TERM CURRENT USE OF INSULIN: ICD-10-CM

## 2023-01-18 DIAGNOSIS — F32.A DEPRESSION, UNSPECIFIED DEPRESSION TYPE: ICD-10-CM

## 2023-01-18 DIAGNOSIS — K21.9 GASTROESOPHAGEAL REFLUX DISEASE, UNSPECIFIED WHETHER ESOPHAGITIS PRESENT: ICD-10-CM

## 2023-01-18 DIAGNOSIS — M47.812 SPONDYLOSIS OF CERVICAL JOINT WITHOUT MYELOPATHY: ICD-10-CM

## 2023-01-18 DIAGNOSIS — E78.5 DYSLIPIDEMIA: Primary | ICD-10-CM

## 2023-01-18 DIAGNOSIS — J30.9 ALLERGIC RHINITIS, UNSPECIFIED SEASONALITY, UNSPECIFIED TRIGGER: ICD-10-CM

## 2023-01-18 DIAGNOSIS — I10 HYPERTENSION, UNSPECIFIED TYPE: ICD-10-CM

## 2023-01-18 LAB
ALBUMIN SERPL BCP-MCNC: 3.8 G/DL (ref 3.5–5)
ALBUMIN/GLOB SERPL: 1.3 {RATIO}
ALP SERPL-CCNC: 89 U/L (ref 55–142)
ALT SERPL W P-5'-P-CCNC: 13 U/L (ref 13–56)
ANION GAP SERPL CALCULATED.3IONS-SCNC: 13 MMOL/L (ref 7–16)
AST SERPL W P-5'-P-CCNC: 11 U/L (ref 15–37)
BASOPHILS # BLD AUTO: 0.06 K/UL (ref 0–0.2)
BASOPHILS NFR BLD AUTO: 0.7 % (ref 0–1)
BILIRUB SERPL-MCNC: 0.4 MG/DL (ref ?–1.2)
BUN SERPL-MCNC: 17 MG/DL (ref 7–18)
BUN/CREAT SERPL: 14 (ref 6–20)
CALCIUM SERPL-MCNC: 9.5 MG/DL (ref 8.5–10.1)
CHLORIDE SERPL-SCNC: 100 MMOL/L (ref 98–107)
CO2 SERPL-SCNC: 28 MMOL/L (ref 21–32)
CREAT SERPL-MCNC: 1.18 MG/DL (ref 0.55–1.02)
DIFFERENTIAL METHOD BLD: ABNORMAL
EGFR (NO RACE VARIABLE) (RUSH/TITUS): 46 ML/MIN/1.73M²
EOSINOPHIL # BLD AUTO: 0.44 K/UL (ref 0–0.5)
EOSINOPHIL NFR BLD AUTO: 5.4 % (ref 1–4)
ERYTHROCYTE [DISTWIDTH] IN BLOOD BY AUTOMATED COUNT: 13.1 % (ref 11.5–14.5)
EST. AVERAGE GLUCOSE BLD GHB EST-MCNC: 127 MG/DL
GLOBULIN SER-MCNC: 2.9 G/DL (ref 2–4)
GLUCOSE SERPL-MCNC: 152 MG/DL (ref 74–106)
HBA1C MFR BLD HPLC: 6.4 % (ref 4.5–6.6)
HCT VFR BLD AUTO: 35.5 % (ref 38–47)
HGB BLD-MCNC: 11.5 G/DL (ref 12–16)
IMM GRANULOCYTES # BLD AUTO: 0.03 K/UL (ref 0–0.04)
IMM GRANULOCYTES NFR BLD: 0.4 % (ref 0–0.4)
LYMPHOCYTES # BLD AUTO: 2.17 K/UL (ref 1–4.8)
LYMPHOCYTES NFR BLD AUTO: 26.4 % (ref 27–41)
MCH RBC QN AUTO: 30.7 PG (ref 27–31)
MCHC RBC AUTO-ENTMCNC: 32.4 G/DL (ref 32–36)
MCV RBC AUTO: 94.9 FL (ref 80–96)
MONOCYTES # BLD AUTO: 0.58 K/UL (ref 0–0.8)
MONOCYTES NFR BLD AUTO: 7.1 % (ref 2–6)
MPC BLD CALC-MCNC: 10.7 FL (ref 9.4–12.4)
NEUTROPHILS # BLD AUTO: 4.94 K/UL (ref 1.8–7.7)
NEUTROPHILS NFR BLD AUTO: 60 % (ref 53–65)
NRBC # BLD AUTO: 0 X10E3/UL
NRBC, AUTO (.00): 0 %
PLATELET # BLD AUTO: 260 K/UL (ref 150–400)
POTASSIUM SERPL-SCNC: 3.3 MMOL/L (ref 3.5–5.1)
PROT SERPL-MCNC: 6.7 G/DL (ref 6.4–8.2)
RBC # BLD AUTO: 3.74 M/UL (ref 4.2–5.4)
SODIUM SERPL-SCNC: 138 MMOL/L (ref 136–145)
T4 FREE SERPL-MCNC: 1.21 NG/DL (ref 0.76–1.46)
TSH SERPL DL<=0.005 MIU/L-ACNC: 0.55 UIU/ML (ref 0.36–3.74)
WBC # BLD AUTO: 8.22 K/UL (ref 4.5–11)

## 2023-01-18 PROCEDURE — 83036 HEMOGLOBIN A1C: ICD-10-PCS | Mod: ,,, | Performed by: CLINICAL MEDICAL LABORATORY

## 2023-01-18 PROCEDURE — 80050 PR  GENERAL HEALTH PANEL: ICD-10-PCS | Mod: ,,, | Performed by: CLINICAL MEDICAL LABORATORY

## 2023-01-18 PROCEDURE — 99214 PR OFFICE/OUTPT VISIT, EST, LEVL IV, 30-39 MIN: ICD-10-PCS | Mod: ,,, | Performed by: FAMILY MEDICINE

## 2023-01-18 PROCEDURE — 3074F SYST BP LT 130 MM HG: CPT | Mod: ,,, | Performed by: FAMILY MEDICINE

## 2023-01-18 PROCEDURE — 84439 T4, FREE: ICD-10-PCS | Mod: ,,, | Performed by: CLINICAL MEDICAL LABORATORY

## 2023-01-18 PROCEDURE — G0009 PNEUMOCOCCAL CONJUGATE VACCINE 20-VALENT: ICD-10-PCS | Mod: ,,, | Performed by: FAMILY MEDICINE

## 2023-01-18 PROCEDURE — 3288F FALL RISK ASSESSMENT DOCD: CPT | Mod: ,,, | Performed by: FAMILY MEDICINE

## 2023-01-18 PROCEDURE — 3078F PR MOST RECENT DIASTOLIC BLOOD PRESSURE < 80 MM HG: ICD-10-PCS | Mod: ,,, | Performed by: FAMILY MEDICINE

## 2023-01-18 PROCEDURE — 1126F AMNT PAIN NOTED NONE PRSNT: CPT | Mod: ,,, | Performed by: FAMILY MEDICINE

## 2023-01-18 PROCEDURE — 1101F PT FALLS ASSESS-DOCD LE1/YR: CPT | Mod: ,,, | Performed by: FAMILY MEDICINE

## 2023-01-18 PROCEDURE — 80050 GENERAL HEALTH PANEL: CPT | Mod: ,,, | Performed by: CLINICAL MEDICAL LABORATORY

## 2023-01-18 PROCEDURE — 3078F DIAST BP <80 MM HG: CPT | Mod: ,,, | Performed by: FAMILY MEDICINE

## 2023-01-18 PROCEDURE — 1160F RVW MEDS BY RX/DR IN RCRD: CPT | Mod: ,,, | Performed by: FAMILY MEDICINE

## 2023-01-18 PROCEDURE — 1101F PR PT FALLS ASSESS DOC 0-1 FALLS W/OUT INJ PAST YR: ICD-10-PCS | Mod: ,,, | Performed by: FAMILY MEDICINE

## 2023-01-18 PROCEDURE — 90677 PCV20 VACCINE IM: CPT | Mod: ,,, | Performed by: FAMILY MEDICINE

## 2023-01-18 PROCEDURE — 3288F PR FALLS RISK ASSESSMENT DOCUMENTED: ICD-10-PCS | Mod: ,,, | Performed by: FAMILY MEDICINE

## 2023-01-18 PROCEDURE — 36415 PR COLLECTION VENOUS BLOOD,VENIPUNCTURE: ICD-10-PCS | Mod: ,,, | Performed by: CLINICAL MEDICAL LABORATORY

## 2023-01-18 PROCEDURE — 83036 HEMOGLOBIN GLYCOSYLATED A1C: CPT | Mod: ,,, | Performed by: CLINICAL MEDICAL LABORATORY

## 2023-01-18 PROCEDURE — 36415 COLL VENOUS BLD VENIPUNCTURE: CPT | Mod: ,,, | Performed by: CLINICAL MEDICAL LABORATORY

## 2023-01-18 PROCEDURE — 1159F PR MEDICATION LIST DOCUMENTED IN MEDICAL RECORD: ICD-10-PCS | Mod: ,,, | Performed by: FAMILY MEDICINE

## 2023-01-18 PROCEDURE — 1160F PR REVIEW ALL MEDS BY PRESCRIBER/CLIN PHARMACIST DOCUMENTED: ICD-10-PCS | Mod: ,,, | Performed by: FAMILY MEDICINE

## 2023-01-18 PROCEDURE — 1126F PR PAIN SEVERITY QUANTIFIED, NO PAIN PRESENT: ICD-10-PCS | Mod: ,,, | Performed by: FAMILY MEDICINE

## 2023-01-18 PROCEDURE — 90677 PNEUMOCOCCAL CONJUGATE VACCINE 20-VALENT: ICD-10-PCS | Mod: ,,, | Performed by: FAMILY MEDICINE

## 2023-01-18 PROCEDURE — 99214 OFFICE O/P EST MOD 30 MIN: CPT | Mod: ,,, | Performed by: FAMILY MEDICINE

## 2023-01-18 PROCEDURE — 3074F PR MOST RECENT SYSTOLIC BLOOD PRESSURE < 130 MM HG: ICD-10-PCS | Mod: ,,, | Performed by: FAMILY MEDICINE

## 2023-01-18 PROCEDURE — 1159F MED LIST DOCD IN RCRD: CPT | Mod: ,,, | Performed by: FAMILY MEDICINE

## 2023-01-18 PROCEDURE — 84439 ASSAY OF FREE THYROXINE: CPT | Mod: ,,, | Performed by: CLINICAL MEDICAL LABORATORY

## 2023-01-18 PROCEDURE — G0009 ADMIN PNEUMOCOCCAL VACCINE: HCPCS | Mod: ,,, | Performed by: FAMILY MEDICINE

## 2023-01-18 RX ORDER — FLUTICASONE PROPIONATE 50 MCG
2 SPRAY, SUSPENSION (ML) NASAL DAILY
Qty: 48 G | Refills: 2 | Status: SHIPPED | OUTPATIENT
Start: 2023-01-18 | End: 2023-06-14 | Stop reason: SDUPTHER

## 2023-01-18 RX ORDER — SPIRONOLACTONE 25 MG/1
25 TABLET ORAL DAILY
Qty: 90 TABLET | Refills: 1 | Status: SHIPPED | OUTPATIENT
Start: 2023-01-18 | End: 2023-06-14

## 2023-01-18 RX ORDER — ESOMEPRAZOLE MAGNESIUM 40 MG/1
40 CAPSULE, DELAYED RELEASE ORAL DAILY
Qty: 90 CAPSULE | Refills: 1 | Status: SHIPPED | OUTPATIENT
Start: 2023-01-18 | End: 2023-06-14 | Stop reason: SDUPTHER

## 2023-01-18 RX ORDER — AMITRIPTYLINE HYDROCHLORIDE 25 MG/1
25 TABLET, FILM COATED ORAL NIGHTLY PRN
Qty: 90 TABLET | Refills: 1 | Status: SHIPPED | OUTPATIENT
Start: 2023-01-18 | End: 2023-06-14 | Stop reason: SDUPTHER

## 2023-01-18 RX ORDER — MONTELUKAST SODIUM 10 MG/1
10 TABLET ORAL NIGHTLY
Qty: 90 TABLET | Refills: 1 | Status: SHIPPED | OUTPATIENT
Start: 2023-01-18 | End: 2023-06-14 | Stop reason: SDUPTHER

## 2023-01-18 RX ORDER — LISINOPRIL 5 MG/1
5 TABLET ORAL DAILY
Qty: 90 TABLET | Refills: 1 | Status: SHIPPED | OUTPATIENT
Start: 2023-01-18 | End: 2023-06-14

## 2023-01-18 RX ORDER — LEVOTHYROXINE SODIUM 75 UG/1
75 TABLET ORAL DAILY
Qty: 90 TABLET | Refills: 1 | Status: SHIPPED | OUTPATIENT
Start: 2023-01-18 | End: 2023-05-02 | Stop reason: DRUGHIGH

## 2023-01-18 RX ORDER — SERTRALINE HYDROCHLORIDE 50 MG/1
50 TABLET, FILM COATED ORAL DAILY
Qty: 90 TABLET | Refills: 1 | Status: SHIPPED | OUTPATIENT
Start: 2023-01-18 | End: 2023-06-14 | Stop reason: SDUPTHER

## 2023-01-18 RX ORDER — ROSUVASTATIN CALCIUM 40 MG/1
40 TABLET, COATED ORAL NIGHTLY
Qty: 90 TABLET | Refills: 1 | Status: SHIPPED | OUTPATIENT
Start: 2023-01-18 | End: 2023-06-14 | Stop reason: SDUPTHER

## 2023-01-18 RX ORDER — ICOSAPENT ETHYL 500 MG/1
1 CAPSULE ORAL 2 TIMES DAILY
Qty: 180 CAPSULE | Refills: 1 | Status: SHIPPED | OUTPATIENT
Start: 2023-01-18 | End: 2023-06-14 | Stop reason: SDUPTHER

## 2023-01-18 RX ORDER — METHOCARBAMOL 500 MG/1
500 TABLET, FILM COATED ORAL 2 TIMES DAILY
Qty: 180 TABLET | Refills: 1 | Status: SHIPPED | OUTPATIENT
Start: 2023-01-18 | End: 2023-06-14 | Stop reason: SDUPTHER

## 2023-01-18 RX ORDER — FUROSEMIDE 40 MG/1
40 TABLET ORAL DAILY
Qty: 90 TABLET | Refills: 1 | Status: SHIPPED | OUTPATIENT
Start: 2023-01-18 | End: 2023-06-14 | Stop reason: SDUPTHER

## 2023-01-18 NOTE — PROGRESS NOTES
Sharee Elizondo is a 82 y.o. female seen today for follow-up for chronic cough and hypertension.  She reports his symptoms have largely resolve we discontinue her lisinopril.  Her blood pressures have been well controlled and she has had no peripheral edema.  Patient is scheduled for follow-up with Cardiology in 2-3 months and I recommended she let the cardiologist know that we discontinued her ACE-inhibitor due to the symptoms.  She has had some episodes of chest discomfort but this is only position or when she leans over.  Her chest x-ray was negative except for degenerative changes of the thoracic spine.  She is up-to-date on her flu vaccination but will need her Prevnar 20 today.  Her lipids were to goal except for her triglycerides in July and we will check on her thyroid and diabetes today.      Past Medical History:   Diagnosis Date    Acute superficial gastritis without hemorrhage 2/18/2022    Atrial fibrillation     Automatic implantable cardiac defibrillator in situ     Bilateral primary osteoarthritis of knee     Cervical spondylosis without myelopathy     CHF (congestive heart failure)     Chronic ischemic heart disease     Chronic kidney disease (CKD), stage III (moderate)     Chronic pain syndrome     COPD (chronic obstructive pulmonary disease)     Coronary arteriosclerosis     Depressive disorder     Esophageal dysphagia 2/18/2022    GERD (gastroesophageal reflux disease)     HH (hiatus hernia) 2/18/2022    Hypothyroidism     Low back pain     Lumbar radiculopathy     Melanoma 2019    scalp    Metabolic syndrome     Myocardial ischemia     Other long term (current) drug therapy     Radiculopathy, cervical region     Type 2 diabetes mellitus      Family History   Problem Relation Age of Onset    Hypertension Mother     Diabetes Mellitus Mother      Current Outpatient Medications on File Prior to Visit   Medication Sig Dispense Refill    ascorbic acid, vitamin C, (VITAMIN C) 500 MG tablet Take 500 mg by  "mouth once daily.      blood sugar diagnostic (TRUE METRIX GLUCOSE TEST STRIP) Strp TEST BLOOD SUGAR TWICE DAILY FOR DIABETES 200 strip 5    blood-glucose meter Misc Use to check blood glucose twice daily for diabetes, E11.9 1 each 0    carvediloL (COREG) 3.125 MG tablet Take 1 tablet (3.125 mg total) by mouth 2 (two) times daily with meals. 90 tablet 1    cholecalciferol, vitamin D3, (VITAMIN D3) 25 mcg (1,000 unit) capsule Take 1,000 Units by mouth once daily.      dulaglutide (TRULICITY) 1.5 mg/0.5 mL pen injector INJECT 1.5 MG INTO THE SKIN EVERY 7 DAYS. 12 pen 1    gabapentin (NEURONTIN) 100 MG capsule Take 1 capsule (100 mg total) by mouth every 8 (eight) hours. 270 capsule 2    HYDROcodone-acetaminophen (NORCO) 7.5-325 mg per tablet Take 1 tablet by mouth every 8 (eight) hours as needed for Pain. 90 tablet 0    [START ON 1/21/2023] HYDROcodone-acetaminophen (NORCO) 7.5-325 mg per tablet Take 1 tablet by mouth every 8 (eight) hours as needed for Pain. 90 tablet 0    insulin degludec (TRESIBA FLEXTOUCH U-100) 100 unit/mL (3 mL) insulin pen INJECT 10 UNITS SUBCUTANEOUSLY EVERY DAY 3 pen 2    lancets (TRUEPLUS LANCETS) 33 gauge Misc CHECK BLOOD SUGAR TWICE DAILY FOR DIABETES 100 each 5    nitroGLYCERIN (NITROSTAT) 0.4 MG SL tablet Place 1 tablet (0.4 mg total) under the tongue every 5 (five) minutes as needed for Chest pain. 30 tablet 2    pen needle, diabetic 32 gauge x 1/4" Ndle Use daily to check blood glucose 100 each 2    [DISCONTINUED] amitriptyline (ELAVIL) 25 MG tablet Take 1 tablet (25 mg total) by mouth nightly as needed for Insomnia. 90 tablet 1    [DISCONTINUED] esomeprazole (NEXIUM) 40 MG capsule Take 1 capsule (40 mg total) by mouth once daily. 90 capsule 1    [DISCONTINUED] fluticasone propionate (FLONASE) 50 mcg/actuation nasal spray 2 sprays (100 mcg total) by Each Nostril route once daily. 48 g 2    [DISCONTINUED] furosemide (LASIX) 40 MG tablet Take 1 tablet (40 mg total) by mouth once daily. " 90 tablet 1    [DISCONTINUED] icosapent ethyL (VASCEPA) 0.5 gram Cap Take 1 capsule by mouth 2 (two) times a day. 180 capsule 1    [DISCONTINUED] levothyroxine (SYNTHROID) 75 MCG tablet Take 1 tablet (75 mcg total) by mouth once daily. 90 tablet 1    [DISCONTINUED] lisinopriL (PRINIVIL,ZESTRIL) 5 MG tablet Take 1 tablet (5 mg total) by mouth once daily. 90 tablet 1    [DISCONTINUED] methocarbamoL (ROBAXIN) 500 MG Tab Take 1 tablet (500 mg total) by mouth 2 (two) times daily. 180 tablet 1    [DISCONTINUED] montelukast (SINGULAIR) 10 mg tablet Take 1 tablet (10 mg total) by mouth every evening. 90 tablet 1    [DISCONTINUED] rosuvastatin (CRESTOR) 40 MG Tab Take 1 tablet (40 mg total) by mouth every evening. 90 tablet 1    [DISCONTINUED] sertraline (ZOLOFT) 50 MG tablet Take 1 tablet (50 mg total) by mouth once daily. 90 tablet 1    [DISCONTINUED] spironolactone (ALDACTONE) 25 MG tablet Take 1 tablet (25 mg total) by mouth once daily. 90 tablet 1     No current facility-administered medications on file prior to visit.     Immunization History   Administered Date(s) Administered    COVID-19, MRNA, LN-S, PF (MODERNA FULL 0.5 ML DOSE) 03/10/2021, 04/07/2021    Influenza (FLUAD) - Quadrivalent - Adjuvanted - PF *Preferred* (65+) 10/27/2022    Influenza - Quadrivalent - High Dose - PF (65 years and older) 10/06/2021       Review of Systems   Constitutional:  Negative for fever, malaise/fatigue and weight loss.   Respiratory:  Negative for shortness of breath.    Cardiovascular:  Negative for chest pain and palpitations.   Gastrointestinal:  Negative for nausea and vomiting.   Musculoskeletal:  Positive for back pain, joint pain and myalgias. Negative for falls.   Psychiatric/Behavioral:  Negative for depression.       Vitals:    01/18/23 0823   BP: 110/68   Pulse: 90   Resp: 19       Physical Exam  Vitals reviewed.   Constitutional:       Appearance: Normal appearance.   HENT:      Head: Normocephalic.   Eyes:       Extraocular Movements: Extraocular movements intact.      Conjunctiva/sclera: Conjunctivae normal.      Pupils: Pupils are equal, round, and reactive to light.   Neck:      Thyroid: No thyroid mass or thyromegaly.   Cardiovascular:      Rate and Rhythm: Normal rate and regular rhythm.      Heart sounds: Normal heart sounds. No murmur heard.    No gallop.   Pulmonary:      Effort: Pulmonary effort is normal. No respiratory distress.      Breath sounds: Normal breath sounds. No wheezing or rales.   Musculoskeletal:      Right lower leg: No edema.      Left lower leg: No edema.   Skin:     General: Skin is warm and dry.      Coloration: Skin is not jaundiced or pale.   Neurological:      Mental Status: She is alert.   Psychiatric:         Mood and Affect: Mood normal.         Behavior: Behavior normal.         Thought Content: Thought content normal.         Judgment: Judgment normal.        Assessment and Plan  Dyslipidemia  -     rosuvastatin (CRESTOR) 40 MG Tab; Take 1 tablet (40 mg total) by mouth every evening.  Dispense: 90 tablet; Refill: 1    Depression, unspecified depression type  -     sertraline (ZOLOFT) 50 MG tablet; Take 1 tablet (50 mg total) by mouth once daily.  Dispense: 90 tablet; Refill: 1    Cough, unspecified type  -     montelukast (SINGULAIR) 10 mg tablet; Take 1 tablet (10 mg total) by mouth every evening.  Dispense: 90 tablet; Refill: 1  -     fluticasone propionate (FLONASE) 50 mcg/actuation nasal spray; 2 sprays (100 mcg total) by Each Nostril route once daily.  Dispense: 48 g; Refill: 2    Allergic rhinitis, unspecified seasonality, unspecified trigger  -     fluticasone propionate (FLONASE) 50 mcg/actuation nasal spray; 2 sprays (100 mcg total) by Each Nostril route once daily.  Dispense: 48 g; Refill: 2    Hypertriglyceridemia  -     icosapent ethyL (VASCEPA) 0.5 gram Cap; Take 1 capsule by mouth 2 (two) times a day.  Dispense: 180 capsule; Refill: 1    Hypertension, unspecified type  -      spironolactone (ALDACTONE) 25 MG tablet; Take 1 tablet (25 mg total) by mouth once daily.  Dispense: 90 tablet; Refill: 1  -     lisinopriL (PRINIVIL,ZESTRIL) 5 MG tablet; Take 1 tablet (5 mg total) by mouth once daily.  Dispense: 90 tablet; Refill: 1  -     furosemide (LASIX) 40 MG tablet; Take 1 tablet (40 mg total) by mouth once daily.  Dispense: 90 tablet; Refill: 1  -     CBC Auto Differential; Future; Expected date: 01/18/2023  -     Comprehensive Metabolic Panel; Future; Expected date: 01/18/2023    Hypothyroidism, unspecified type  -     levothyroxine (SYNTHROID) 75 MCG tablet; Take 1 tablet (75 mcg total) by mouth once daily.  Dispense: 90 tablet; Refill: 1  -     TSH; Future; Expected date: 01/18/2023  -     T4, Free; Future; Expected date: 01/18/2023    Type 2 diabetes mellitus without complication, without long-term current use of insulin  -     Hemoglobin A1C; Future; Expected date: 01/18/2023    Spondylosis of cervical joint without myelopathy  -     methocarbamoL (ROBAXIN) 500 MG Tab; Take 1 tablet (500 mg total) by mouth 2 (two) times daily.  Dispense: 180 tablet; Refill: 1  -     amitriptyline (ELAVIL) 25 MG tablet; Take 1 tablet (25 mg total) by mouth nightly as needed for Insomnia.  Dispense: 90 tablet; Refill: 1    Gastroesophageal reflux disease, unspecified whether esophagitis present  -     esomeprazole (NEXIUM) 40 MG capsule; Take 1 capsule (40 mg total) by mouth once daily.  Dispense: 90 capsule; Refill: 1            Return to clinic as needed once her lab work is in.    Health Maintenance Topics with due status: Not Due       Topic Last Completion Date    Lipid Panel 07/21/2022    DEXA Scan 08/29/2022    Diabetes Urine Screening 10/27/2022

## 2023-01-20 ENCOUNTER — TELEPHONE (OUTPATIENT)
Dept: FAMILY MEDICINE | Facility: CLINIC | Age: 83
End: 2023-01-20
Payer: MEDICARE

## 2023-01-20 NOTE — LETTER
January 20, 2023    Sharee Elizondo  [address] [place] [address]             [facility]  [address] [place] [address] [place] [address]  Phone: [phone]  Fax: [phone] Dear Ms. Elizondo:    Please contact the clinic to discuss recent lab results.     Sincerely,        Dawn Martinez lpn

## 2023-01-20 NOTE — TELEPHONE ENCOUNTER
----- Message from Hollis Rice MD sent at 1/19/2023  8:00 AM CST -----  Thyroid studies are to goal and she has chronic stable renal insufficiency and she would avoid NSAIDs and increase her fluid intake.  Patient has a chronic stable anemia.  Patient's diabetes is well controlled

## 2023-02-21 NOTE — PROGRESS NOTES
Subjective:         Patient ID: Sharee Elizondo is a 83 y.o. female.    Chief Complaint: Neck Pain (Also pain in both legs)        Pain  This is a chronic problem. The current episode started more than 1 year ago. The problem occurs daily. The problem has been waxing and waning. Associated symptoms include arthralgias and neck pain. Pertinent negatives include no anorexia, chest pain, chills, coughing, diaphoresis, fever, sore throat, urinary symptoms, vertigo or vomiting.   Review of Systems   Constitutional:  Negative for activity change, appetite change, chills, diaphoresis, fever and unexpected weight change.   HENT:  Negative for drooling, ear discharge, ear pain, facial swelling, mouth dryness, nosebleeds, sore throat, trouble swallowing, voice change and goiter.    Eyes:  Negative for photophobia, pain, discharge, redness and visual disturbance.   Respiratory:  Negative for apnea, cough, choking, chest tightness, shortness of breath, wheezing and stridor.    Cardiovascular:  Negative for chest pain, palpitations and leg swelling.   Gastrointestinal:  Negative for abdominal distention, anorexia, diarrhea, rectal pain, vomiting and fecal incontinence.   Endocrine: Negative for cold intolerance, heat intolerance, polydipsia, polyphagia and polyuria.   Genitourinary:  Negative for bladder incontinence, dysuria, flank pain, frequency and hot flashes.   Musculoskeletal:  Positive for arthralgias, back pain, leg pain and neck pain.   Integumentary:  Negative for color change and pallor.   Allergic/Immunologic: Negative for immunocompromised state.   Neurological:  Negative for dizziness, vertigo, seizures, syncope, facial asymmetry, speech difficulty, light-headedness, coordination difficulties, memory loss and coordination difficulties.   Hematological:  Negative for adenopathy. Does not bruise/bleed easily.   Psychiatric/Behavioral:  Negative for agitation, behavioral problems, confusion, decreased concentration,  dysphoric mood, hallucinations, self-injury and suicidal ideas. The patient is not nervous/anxious and is not hyperactive.          Past Medical History:   Diagnosis Date    Acute superficial gastritis without hemorrhage 2/18/2022    Atrial fibrillation     Automatic implantable cardiac defibrillator in situ     Bilateral primary osteoarthritis of knee     Cervical spondylosis without myelopathy     CHF (congestive heart failure)     Chronic ischemic heart disease     Chronic kidney disease (CKD), stage III (moderate)     Chronic pain syndrome     COPD (chronic obstructive pulmonary disease)     Coronary arteriosclerosis     Depressive disorder     Esophageal dysphagia 2/18/2022    GERD (gastroesophageal reflux disease)     HH (hiatus hernia) 2/18/2022    Hypothyroidism     Low back pain     Lumbar radiculopathy     Melanoma 2019    scalp    Metabolic syndrome     Myocardial ischemia     Other long term (current) drug therapy     Radiculopathy, cervical region     Type 2 diabetes mellitus      Past Surgical History:   Procedure Laterality Date    BACK SURGERY      bilateral knee injection/ aspiration of joint/ bursa-large  Bilateral 4/4/2017 3/27/2017 3/20/2017    bilateral knee orthovisc injection    CARDIAC SURGERY      EPIDURAL STEROID INJECTION INTO CERVICAL SPINE  6/19/2019 5/17/2017 4/26/2017    C6-7 LONNIE, Dr Farah    EPIDURAL STEROID INJECTION INTO LUMBAR SPINE  03/15/2018    L4-5 LONNIE, Dr Farah    HYSTERECTOMY      INJECTION OF ANESTHETIC AGENT AROUND MEDIAL BRANCH NERVES INNERVATING LUMBAR FACET JOINT Bilateral 4/14/2022    Procedure: Block-nerve-medial branch-lumbar, bilateral L4 through S1;  Surgeon: Angie Farah MD;  Location: [facility] [place];  Service: Pain Management;  Laterality: Bilateral;  pt aware at visit to be tested  4/12 patient getting covid test today    INJECTION OF FACET JOINT Bilateral 8/17/2017 7/19/2017    Bilateral C3-7 FIDr Farah    INJECTION OF FACET JOINT Left 10/18/2018     "left C3-7 FI, Dr Farah    INJECTION OF FACET JOINT Bilateral 8/22/2018 6/29/2018    bilateral L3-S1 FI, Dr Farah    RADIOFREQUENCY ABLATION Right 12/12/2018    Right C3-7 RF, Dr Farah    RADIOFREQUENCY ABLATION Left 01/9/2019 10/16/2017 10/7/2020    Left C3-7 RF, Dr Farah    RADIOFREQUENCY ABLATION Left 1/9/2019 10/16/2017    left C3-7 RF, Dr Farah    RADIOFREQUENCY THERMAL COAGULATION OF MEDIAL BRANCH OF POSTERIOR RAMUS OF CERVICAL SPINAL NERVE Left 8/5/2021    Procedure: RADIOFREQUENCY THERMAL COAGULATION, NERVE, SPINAL, CERVICAL, POSTERIOR RAMUS, MEDIAL BRANCH, Left C3-4,4-5  only 2 levels resubmitted dt denied all levels on 8-3-2021;  Surgeon: Angie Farah MD;  Location: [facility] [place];  Service: Pain Management;  Laterality: Left;    RADIOFREQUENCY THERMAL COAGULATION OF MEDIAL BRANCH OF POSTERIOR RAMUS OF CERVICAL SPINAL NERVE Right 8/24/2021    Procedure: RADIOFREQUENCY THERMAL COAGULATION, NERVE, SPINAL, CERVICAL, POSTERIOR RAMUS, MEDIAL BRANCH RIGHT C3-C5 RFTC;  Surgeon: Angie Farah MD;  Location: [facility] [place];  Service: Pain Management;  Laterality: Right;  HAD VAC WILL BRING CARD    TRANSFORAMINAL EPIDURAL INJECTION OF STEROID Right 5/30/2018 5/2/2018    Right L4-5 TFESI, Dr Farah     Social History     Socioeconomic History    Marital status:    Tobacco Use    Smoking status: Every Day     Packs/day: 0.50     Years: 40.00     Pack years: 20.00     Types: Cigarettes    Smokeless tobacco: Never   Substance and Sexual Activity    Alcohol use: Not Currently    Drug use: Never     Family History   Problem Relation Age of Onset    Hypertension Mother     Diabetes Mellitus Mother      Review of patient's allergies indicates:  No Known Allergies     Objective:  Vitals:    02/22/23 0944   BP: 109/65   Pulse: 97   Resp: (!) 22   Weight: 64.2 kg (141 lb 9.6 oz)   Height: 5' 3" (1.6 m)   PainSc:   4   PainLoc: Neck           Physical Exam  Vitals and nursing note reviewed. Exam " conducted with a chaperone present.   Constitutional:       General: She is awake. She is not in acute distress.     Appearance: Normal appearance. She is not ill-appearing or toxic-appearing.   HENT:      Head: Normocephalic and atraumatic.      Nose: Nose normal.      Mouth/Throat:      Mouth: Mucous membranes are moist.      Pharynx: Oropharynx is clear.   Eyes:      Conjunctiva/sclera: Conjunctivae normal.      Pupils: Pupils are equal, round, and reactive to light.   Pulmonary:      Effort: Pulmonary effort is normal. No respiratory distress.   Abdominal:      Palpations: Abdomen is soft.      Tenderness: There is no guarding.   Musculoskeletal:         General: Normal range of motion.      Cervical back: Normal range of motion and neck supple. Tenderness present. No rigidity.      Thoracic back: Tenderness present.      Lumbar back: Tenderness present.   Skin:     General: Skin is warm and dry.      Coloration: Skin is not jaundiced or pale.   Neurological:      General: No focal deficit present.      Mental Status: She is alert and oriented to person, place, and time. Mental status is at baseline.      Cranial Nerves: No cranial nerve deficit (II-XII).      Gait: Gait abnormal.   Psychiatric:         Mood and Affect: Mood normal.         Behavior: Behavior normal. Behavior is cooperative.         Thought Content: Thought content normal.         X-Ray Chest PA And Lateral  Narrative: EXAMINATION:  XR CHEST PA AND LATERAL    CLINICAL HISTORY:  Cough, unspecified    COMPARISON:  01/29/2015, 09/25/2019, and 07/29/2020    FINDINGS:  PA and lateral view of the chest:    Sternal wires and mediastinal clips are present from previous CABG.  There is a defibrillator terminal over the left upper chest wall with a single right ventricular lead.  There is cardiomegaly but no suggestion of failure or interval change in the cardiac size.  No peripheral infiltrates or pleural effusions are seen.  Degenerative changes are  present in the thoracic spine.  Impression: There is mild, stable cardiomegaly but no evidence of acute disease.    Place of service: [facility]    Electronically signed by: Isaura Mahmood  Date:    12/29/2022  Time:    12:17         Office Visit on 01/18/2023   Component Date Value Ref Range Status    Hemoglobin A1C 01/18/2023 6.4  4.5 - 6.6 % Final    Estimated Average Glucose 01/18/2023 127  mg/dL Final    Free T4 01/18/2023 1.21  0.76 - 1.46 ng/dL Final    TSH 01/18/2023 0.552  0.358 - 3.740 uIU/mL Final    Sodium 01/18/2023 138  136 - 145 mmol/L Final    Potassium 01/18/2023 3.3 (L)  3.5 - 5.1 mmol/L Final    Chloride 01/18/2023 100  98 - 107 mmol/L Final    CO2 01/18/2023 28  21 - 32 mmol/L Final    Anion Gap 01/18/2023 13  7 - 16 mmol/L Final    Glucose 01/18/2023 152 (H)  74 - 106 mg/dL Final    BUN 01/18/2023 17  7 - 18 mg/dL Final    Creatinine 01/18/2023 1.18 (H)  0.55 - 1.02 mg/dL Final    BUN/Creatinine Ratio 01/18/2023 14  6 - 20 Final    Calcium 01/18/2023 9.5  8.5 - 10.1 mg/dL Final    Total Protein 01/18/2023 6.7  6.4 - 8.2 g/dL Final    Albumin 01/18/2023 3.8  3.5 - 5.0 g/dL Final    Globulin 01/18/2023 2.9  2.0 - 4.0 g/dL Final    A/G Ratio 01/18/2023 1.3   Final    Bilirubin, Total 01/18/2023 0.4  >0.0 - 1.2 mg/dL Final    Alk Phos 01/18/2023 89  55 - 142 U/L Final    ALT 01/18/2023 13  13 - 56 U/L Final    AST 01/18/2023 11 (L)  15 - 37 U/L Final    eGFR 01/18/2023 46 (L)  >=60 mL/min/1.73m² Final    WBC 01/18/2023 8.22  4.50 - 11.00 K/uL Final    RBC 01/18/2023 3.74 (L)  4.20 - 5.40 M/uL Final    Hemoglobin 01/18/2023 11.5 (L)  12.0 - 16.0 g/dL Final    Hematocrit 01/18/2023 35.5 (L)  38.0 - 47.0 % Final    MCV 01/18/2023 94.9  80.0 - 96.0 fL Final    MCH 01/18/2023 30.7  27.0 - 31.0 pg Final    MCHC 01/18/2023 32.4  32.0 - 36.0 g/dL Final    RDW 01/18/2023 13.1  11.5 - 14.5 % Final    Platelet Count 01/18/2023 260  150 - 400 K/uL Final    MPV 01/18/2023 10.7  9.4 - 12.4 fL Final     Neutrophils % 01/18/2023 60.0  53.0 - 65.0 % Final    Lymphocytes % 01/18/2023 26.4 (L)  27.0 - 41.0 % Final    Monocytes % 01/18/2023 7.1 (H)  2.0 - 6.0 % Final    Eosinophils % 01/18/2023 5.4 (H)  1.0 - 4.0 % Final    Basophils % 01/18/2023 0.7  0.0 - 1.0 % Final    Immature Granulocytes % 01/18/2023 0.4  0.0 - 0.4 % Final    nRBC, Auto 01/18/2023 0.0  <=0.0 % Final    Neutrophils, Abs 01/18/2023 4.94  1.80 - 7.70 K/uL Final    Lymphocytes, Absolute 01/18/2023 2.17  1.00 - 4.80 K/uL Final    Monocytes, Absolute 01/18/2023 0.58  0.00 - 0.80 K/uL Final    Eosinophils, Absolute 01/18/2023 0.44  0.00 - 0.50 K/uL Final    Basophils, Absolute 01/18/2023 0.06  0.00 - 0.20 K/uL Final    Immature Granulocytes, Absolute 01/18/2023 0.03  0.00 - 0.04 K/uL Final    nRBC, Absolute 01/18/2023 0.00  <=0.00 x10e3/uL Final    Diff Type 01/18/2023 Auto   Final   Office Visit on 11/22/2022   Component Date Value Ref Range Status    POC Amphetamines 11/22/2022 Negative  Negative, Inconclusive Final    POC Barbiturates 11/22/2022 Negative  Negative, Inconclusive Final    POC Benzodiazepines 11/22/2022 Negative  Negative, Inconclusive Final    POC Cocaine 11/22/2022 Negative  Negative, Inconclusive Final    POC THC 11/22/2022 Negative  Negative, Inconclusive Final    POC Methadone 11/22/2022 Negative  Negative, Inconclusive Final    POC Methamphetamine 11/22/2022 Negative  Negative, Inconclusive Final    POC Opiates 11/22/2022 Presumptive Positive (A)  Negative, Inconclusive Final    POC Oxycodone 11/22/2022 Negative  Negative, Inconclusive Final    POC Phencyclidine 11/22/2022 Negative  Negative, Inconclusive Final    POC Methylenedioxymethamphetamine * 11/22/2022 Negative  Negative, Inconclusive Final    POC Tricyclic Antidepressants 11/22/2022 Negative  Negative, Inconclusive Final    POC Buprenorphine 11/22/2022 Negative   Final     Acceptable 11/22/2022 Yes   Final    POC Temperature (Urine) 11/22/2022 92   Final    Admission on 11/08/2022, Discharged on 11/08/2022   Component Date Value Ref Range Status    POC Glucose 11/08/2022 108 (H)  70 - 105 mg/dL Final   Hospital Outpatient Visit on 09/28/2022   Component Date Value Ref Range Status    POC Glucose 09/28/2022 117 (A)  70 - 110 MG/DL Final    POC Glucose 09/28/2022 119 (H)  70 - 105 mg/dL Final    Case Report 09/28/2022    Final                    Value:Surgical Pathology                                Case: [id number]                                   Authorizing Provider:  Michael Long MD      Collected:           09/28/2022 02:13 PM          Ordering Location:     [facility] - Endoscopy       Received:            09/28/2022 02:57 PM          Pathologist:           Rene Alegre MD                                                            Specimen:    Stomach, A- Stomach Bx                                                                     Final Diagnosis 09/28/2022    Final                    Value:This result contains rich text formatting which cannot be displayed here.    Gross Description 09/28/2022    Final                    Value:This result contains rich text formatting which cannot be displayed here.    Microscopic Description 09/28/2022    Final                    Value:This result contains rich text formatting which cannot be displayed here.    Laboratory Notes 09/28/2022    Final                    Value:This result contains rich text formatting which cannot be displayed here.   Hospital Outpatient Visit on 08/30/2022   Component Date Value Ref Range Status    PT 08/30/2022 12.8  11.7 - 14.7 seconds Final    INR 08/30/2022 1.00  <=3.30 Final    PTT 08/30/2022 28.8  25.2 - 37.3 seconds Final    WBC 08/30/2022 9.79  4.50 - 11.00 K/uL Final    RBC 08/30/2022 3.87 (L)  4.20 - 5.40 M/uL Final    Hemoglobin 08/30/2022 11.9 (L)  12.0 - 16.0 g/dL Final    Hematocrit 08/30/2022 36.2 (L)  38.0 - 47.0 % Final    MCV 08/30/2022 93.5  80.0 - 96.0 fL Final    MCH  08/30/2022 30.7  27.0 - 31.0 pg Final    MCHC 08/30/2022 32.9  32.0 - 36.0 g/dL Final    RDW 08/30/2022 12.3  11.5 - 14.5 % Final    Platelet Count 08/30/2022 219  150 - 400 K/uL Final    MPV 08/30/2022 10.5  9.4 - 12.4 fL Final    Neutrophils % 08/30/2022 59.3  53.0 - 65.0 % Final    Lymphocytes % 08/30/2022 29.7  27.0 - 41.0 % Final    Monocytes % 08/30/2022 6.3 (H)  2.0 - 6.0 % Final    Eosinophils % 08/30/2022 3.7  1.0 - 4.0 % Final    Basophils % 08/30/2022 0.7  0.0 - 1.0 % Final    Immature Granulocytes % 08/30/2022 0.3  0.0 - 0.4 % Final    nRBC, Auto 08/30/2022 0.0  <=0.0 % Final    Neutrophils, Abs 08/30/2022 5.80  1.80 - 7.70 K/uL Final    Lymphocytes, Absolute 08/30/2022 2.91  1.00 - 4.80 K/uL Final    Monocytes, Absolute 08/30/2022 0.62  0.00 - 0.80 K/uL Final    Eosinophils, Absolute 08/30/2022 0.36  0.00 - 0.50 K/uL Final    Basophils, Absolute 08/30/2022 0.07  0.00 - 0.20 K/uL Final    Immature Granulocytes, Absolute 08/30/2022 0.03  0.00 - 0.04 K/uL Final    nRBC, Absolute 08/30/2022 0.00  <=0.00 x10e3/uL Final    Diff Type 08/30/2022 Auto   Final   Lab Visit on 08/23/2022   Component Date Value Ref Range Status    TSH 08/23/2022 5.360 (H)  0.358 - 3.740 uIU/mL Final    Free T3 08/23/2022 2.02 (L)  2.18 - 3.98 pg/mL Final         Orders Placed This Encounter   Procedures    POCT Urine Drug Screen Presump     Interpretive Information:     Negative:  No drug detected at the cut off level.   Positive:  This result represents presumptive positive for the   tested drug, other substances may yield a positive response other   than the analyte of interest. This result should be utilized for   diagnostic purpose only. Confirmation testing will be performed upon physician request only.            Requested Prescriptions     Signed Prescriptions Disp Refills    gabapentin (NEURONTIN) 100 MG capsule 270 capsule 2     Sig: Take 1 capsule (100 mg total) by mouth every 8 (eight) hours.    HYDROcodone-acetaminophen  (NORCO) 7.5-325 mg per tablet 90 tablet 0     Sig: Take 1 tablet by mouth every 8 (eight) hours as needed for Pain.    HYDROcodone-acetaminophen (NORCO) 7.5-325 mg per tablet 90 tablet 0     Sig: Take 1 tablet by mouth every 8 (eight) hours as needed for Pain.    HYDROcodone-acetaminophen (NORCO) 7.5-325 mg per tablet 90 tablet 0     Sig: Take 1 tablet by mouth every 8 (eight) hours as needed for Pain.       Assessment:     1. Thoracic radiculopathy    2. 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 Chronic pain of right knee    5. Lumbar radiculopathy    6. Pain in thoracic spine    7. Encounter for long-term (current) use of other medications           A's of Opioid Risk Assessment  Activity:Patient can perform ADL.   Analgesia:Patients pain is partially controlled by current medication. Patient has tried OTC medications such as Tylenol and Ibuprofen with out relief.   Adverse Effects: Patient denies constipation or sedation.  Aberrant Behavior:  reviewed with no aberrant drug seeking/taking behavior.  Overdose reversal drug naloxone discussed    Drug screen reviewed      Plan:    Narcan November 2022    Cardiac defibrillator cannot have MRI    History lumbar surgery 2005, Dr. Woods [facility]    She had bilateral genicular nerve block # 1, November 8, 2022, she states she would 80% relief maintaining 60% relief with time, the procedure did help improve her level of function    X-ray bilateral knee [facility] September 4, 2020 degenerative changes noted no fracture noted    Follow-up after CT thoracic spine     She states her thoracic spine discomfort has improved having some neck pain worse with flexion extension rotation ongoing for about 3 months     We discussed her CT thoracic spine degenerative changes throughout the thoracic spine radiologist did find a noncalcified 5-6 mm lesion left lower lung area recommended  follow-up with CT chest 6 months     She will discuss the CT findings with her family doctor    Would like to continue conservative management in regards her cervical spine discomfort for now    Continue home exercise program as directed    Patient can not have MRI    Follow-up follow-up 3 months    Dr. Farah November 2023    Bring original prescription medication bottles/container/box with labels to each visit    Pill count    Physical therapy    Massage therapy declines

## 2023-02-22 ENCOUNTER — OFFICE VISIT (OUTPATIENT)
Dept: PAIN MEDICINE | Facility: CLINIC | Age: 83
End: 2023-02-22
Payer: MEDICARE

## 2023-02-22 VITALS
RESPIRATION RATE: 22 BRPM | HEIGHT: 63 IN | BODY MASS INDEX: 25.09 KG/M2 | WEIGHT: 141.63 LBS | DIASTOLIC BLOOD PRESSURE: 65 MMHG | SYSTOLIC BLOOD PRESSURE: 109 MMHG | HEART RATE: 97 BPM

## 2023-02-22 DIAGNOSIS — Z79.899 ENCOUNTER FOR LONG-TERM (CURRENT) USE OF OTHER MEDICATIONS: ICD-10-CM

## 2023-02-22 DIAGNOSIS — G89.29 CHRONIC PAIN OF RIGHT KNEE: ICD-10-CM

## 2023-02-22 DIAGNOSIS — M25.561 CHRONIC PAIN OF RIGHT KNEE: ICD-10-CM

## 2023-02-22 DIAGNOSIS — M54.14 THORACIC RADICULOPATHY: Primary | Chronic | ICD-10-CM

## 2023-02-22 DIAGNOSIS — M54.6 PAIN IN THORACIC SPINE: ICD-10-CM

## 2023-02-22 DIAGNOSIS — M54.16 LUMBAR RADICULOPATHY: Chronic | ICD-10-CM

## 2023-02-22 DIAGNOSIS — M89.49 OSTEOARTHROSIS MULTIPLE SITES, NOT SPECIFIED AS GENERALIZED: Chronic | ICD-10-CM

## 2023-02-22 DIAGNOSIS — M47.812 SPONDYLOSIS OF CERVICAL JOINT WITHOUT MYELOPATHY: Chronic | ICD-10-CM

## 2023-02-22 LAB
CTP QC/QA: YES
POC (AMP) AMPHETAMINE: NEGATIVE
POC (BAR) BARBITURATES: NEGATIVE
POC (BUP) BUPRENORPHINE: NEGATIVE
POC (BZO) BENZODIAZEPINES: NEGATIVE
POC (COC) COCAINE: NEGATIVE
POC (MDMA) METHYLENEDIOXYMETHAMPHETAMINE 3,4: NEGATIVE
POC (MET) METHAMPHETAMINE: NEGATIVE
POC (MOP) OPIATES: ABNORMAL
POC (MTD) METHADONE: NEGATIVE
POC (OXY) OXYCODONE: NEGATIVE
POC (PCP) PHENCYCLIDINE: NEGATIVE
POC (TCA) TRICYCLIC ANTIDEPRESSANTS: NEGATIVE
POC TEMPERATURE (URINE): 90
POC THC: NEGATIVE

## 2023-02-22 PROCEDURE — 3288F PR FALLS RISK ASSESSMENT DOCUMENTED: ICD-10-PCS | Mod: CPTII,,, | Performed by: PHYSICIAN ASSISTANT

## 2023-02-22 PROCEDURE — 1125F PR PAIN SEVERITY QUANTIFIED, PAIN PRESENT: ICD-10-PCS | Mod: CPTII,,, | Performed by: PHYSICIAN ASSISTANT

## 2023-02-22 PROCEDURE — 1159F MED LIST DOCD IN RCRD: CPT | Mod: CPTII,,, | Performed by: PHYSICIAN ASSISTANT

## 2023-02-22 PROCEDURE — 3074F PR MOST RECENT SYSTOLIC BLOOD PRESSURE < 130 MM HG: ICD-10-PCS | Mod: CPTII,,, | Performed by: PHYSICIAN ASSISTANT

## 2023-02-22 PROCEDURE — 99214 PR OFFICE/OUTPT VISIT, EST, LEVL IV, 30-39 MIN: ICD-10-PCS | Mod: S$PBB,,, | Performed by: PHYSICIAN ASSISTANT

## 2023-02-22 PROCEDURE — 3074F SYST BP LT 130 MM HG: CPT | Mod: CPTII,,, | Performed by: PHYSICIAN ASSISTANT

## 2023-02-22 PROCEDURE — 80305 DRUG TEST PRSMV DIR OPT OBS: CPT | Mod: PBBFAC | Performed by: PHYSICIAN ASSISTANT

## 2023-02-22 PROCEDURE — 99215 OFFICE O/P EST HI 40 MIN: CPT | Mod: PBBFAC | Performed by: PHYSICIAN ASSISTANT

## 2023-02-22 PROCEDURE — 3288F FALL RISK ASSESSMENT DOCD: CPT | Mod: CPTII,,, | Performed by: PHYSICIAN ASSISTANT

## 2023-02-22 PROCEDURE — 3078F PR MOST RECENT DIASTOLIC BLOOD PRESSURE < 80 MM HG: ICD-10-PCS | Mod: CPTII,,, | Performed by: PHYSICIAN ASSISTANT

## 2023-02-22 PROCEDURE — 1101F PR PT FALLS ASSESS DOC 0-1 FALLS W/OUT INJ PAST YR: ICD-10-PCS | Mod: CPTII,,, | Performed by: PHYSICIAN ASSISTANT

## 2023-02-22 PROCEDURE — 1159F PR MEDICATION LIST DOCUMENTED IN MEDICAL RECORD: ICD-10-PCS | Mod: CPTII,,, | Performed by: PHYSICIAN ASSISTANT

## 2023-02-22 PROCEDURE — 1101F PT FALLS ASSESS-DOCD LE1/YR: CPT | Mod: CPTII,,, | Performed by: PHYSICIAN ASSISTANT

## 2023-02-22 PROCEDURE — 3078F DIAST BP <80 MM HG: CPT | Mod: CPTII,,, | Performed by: PHYSICIAN ASSISTANT

## 2023-02-22 PROCEDURE — 1125F AMNT PAIN NOTED PAIN PRSNT: CPT | Mod: CPTII,,, | Performed by: PHYSICIAN ASSISTANT

## 2023-02-22 PROCEDURE — 99214 OFFICE O/P EST MOD 30 MIN: CPT | Mod: S$PBB,,, | Performed by: PHYSICIAN ASSISTANT

## 2023-02-22 RX ORDER — HYDROCODONE BITARTRATE AND ACETAMINOPHEN 7.5; 325 MG/1; MG/1
1 TABLET ORAL EVERY 8 HOURS PRN
Qty: 90 TABLET | Refills: 0 | Status: SHIPPED | OUTPATIENT
Start: 2023-04-26 | End: 2023-05-24 | Stop reason: SDUPTHER

## 2023-02-22 RX ORDER — HYDROCODONE BITARTRATE AND ACETAMINOPHEN 7.5; 325 MG/1; MG/1
1 TABLET ORAL EVERY 8 HOURS PRN
Qty: 90 TABLET | Refills: 0 | Status: SHIPPED | OUTPATIENT
Start: 2023-02-25 | End: 2023-03-30 | Stop reason: SDUPTHER

## 2023-02-22 RX ORDER — HYDROCODONE BITARTRATE AND ACETAMINOPHEN 7.5; 325 MG/1; MG/1
1 TABLET ORAL EVERY 8 HOURS PRN
Qty: 90 TABLET | Refills: 0 | Status: SHIPPED | OUTPATIENT
Start: 2023-03-27 | End: 2023-05-24 | Stop reason: SDUPTHER

## 2023-02-22 RX ORDER — GABAPENTIN 100 MG/1
100 CAPSULE ORAL EVERY 8 HOURS
Qty: 270 CAPSULE | Refills: 2 | Status: SHIPPED | OUTPATIENT
Start: 2023-02-22 | End: 2023-05-24 | Stop reason: SDUPTHER

## 2023-02-28 ENCOUNTER — OFFICE VISIT (OUTPATIENT)
Dept: FAMILY MEDICINE | Facility: CLINIC | Age: 83
End: 2023-02-28
Payer: MEDICARE

## 2023-02-28 VITALS
HEART RATE: 78 BPM | SYSTOLIC BLOOD PRESSURE: 92 MMHG | BODY MASS INDEX: 24.98 KG/M2 | RESPIRATION RATE: 14 BRPM | WEIGHT: 141 LBS | HEIGHT: 63 IN | OXYGEN SATURATION: 98 % | DIASTOLIC BLOOD PRESSURE: 60 MMHG

## 2023-02-28 DIAGNOSIS — R91.1 LUNG NODULE SEEN ON IMAGING STUDY: Primary | ICD-10-CM

## 2023-02-28 PROCEDURE — 1159F MED LIST DOCD IN RCRD: CPT | Mod: ,,, | Performed by: FAMILY MEDICINE

## 2023-02-28 PROCEDURE — 1159F PR MEDICATION LIST DOCUMENTED IN MEDICAL RECORD: ICD-10-PCS | Mod: ,,, | Performed by: FAMILY MEDICINE

## 2023-02-28 PROCEDURE — 3078F PR MOST RECENT DIASTOLIC BLOOD PRESSURE < 80 MM HG: ICD-10-PCS | Mod: ,,, | Performed by: FAMILY MEDICINE

## 2023-02-28 PROCEDURE — 99212 OFFICE O/P EST SF 10 MIN: CPT | Mod: ,,, | Performed by: FAMILY MEDICINE

## 2023-02-28 PROCEDURE — 1160F RVW MEDS BY RX/DR IN RCRD: CPT | Mod: ,,, | Performed by: FAMILY MEDICINE

## 2023-02-28 PROCEDURE — 1101F PR PT FALLS ASSESS DOC 0-1 FALLS W/OUT INJ PAST YR: ICD-10-PCS | Mod: ,,, | Performed by: FAMILY MEDICINE

## 2023-02-28 PROCEDURE — 3288F FALL RISK ASSESSMENT DOCD: CPT | Mod: ,,, | Performed by: FAMILY MEDICINE

## 2023-02-28 PROCEDURE — 1160F PR REVIEW ALL MEDS BY PRESCRIBER/CLIN PHARMACIST DOCUMENTED: ICD-10-PCS | Mod: ,,, | Performed by: FAMILY MEDICINE

## 2023-02-28 PROCEDURE — 1101F PT FALLS ASSESS-DOCD LE1/YR: CPT | Mod: ,,, | Performed by: FAMILY MEDICINE

## 2023-02-28 PROCEDURE — 3074F SYST BP LT 130 MM HG: CPT | Mod: ,,, | Performed by: FAMILY MEDICINE

## 2023-02-28 PROCEDURE — 3288F PR FALLS RISK ASSESSMENT DOCUMENTED: ICD-10-PCS | Mod: ,,, | Performed by: FAMILY MEDICINE

## 2023-02-28 PROCEDURE — 3074F PR MOST RECENT SYSTOLIC BLOOD PRESSURE < 130 MM HG: ICD-10-PCS | Mod: ,,, | Performed by: FAMILY MEDICINE

## 2023-02-28 PROCEDURE — 99212 PR OFFICE/OUTPT VISIT, EST, LEVL II, 10-19 MIN: ICD-10-PCS | Mod: ,,, | Performed by: FAMILY MEDICINE

## 2023-02-28 PROCEDURE — 3078F DIAST BP <80 MM HG: CPT | Mod: ,,, | Performed by: FAMILY MEDICINE

## 2023-02-28 NOTE — PROGRESS NOTES
Sharee Elizondo is a 83 y.o. female seen today for follow-up for thoracic CT where a 5-6 mm nodule is noted in the left medial lower lobe.  Patient does have a smoking history and we discussed a pulmonology evaluation.      Past Medical History:   Diagnosis Date    Acute superficial gastritis without hemorrhage 2/18/2022    Atrial fibrillation     Automatic implantable cardiac defibrillator in situ     Bilateral primary osteoarthritis of knee     Cervical spondylosis without myelopathy     CHF (congestive heart failure)     Chronic ischemic heart disease     Chronic kidney disease (CKD), stage III (moderate)     Chronic pain syndrome     COPD (chronic obstructive pulmonary disease)     Coronary arteriosclerosis     Depressive disorder     Esophageal dysphagia 2/18/2022    GERD (gastroesophageal reflux disease)     HH (hiatus hernia) 2/18/2022    Hypothyroidism     Low back pain     Lumbar radiculopathy     Melanoma 2019    scalp    Metabolic syndrome     Myocardial ischemia     Other long term (current) drug therapy     Radiculopathy, cervical region     Type 2 diabetes mellitus      Family History   Problem Relation Age of Onset    Hypertension Mother     Diabetes Mellitus Mother      Current Outpatient Medications on File Prior to Visit   Medication Sig Dispense Refill    amitriptyline (ELAVIL) 25 MG tablet Take 1 tablet (25 mg total) by mouth nightly as needed for Insomnia. 90 tablet 1    ascorbic acid, vitamin C, (VITAMIN C) 500 MG tablet Take 500 mg by mouth once daily.      blood sugar diagnostic (TRUE METRIX GLUCOSE TEST STRIP) [facility] TEST BLOOD SUGAR TWICE DAILY FOR DIABETES 200 strip 5    blood-glucose meter Misc Use to check blood glucose twice daily for diabetes, E11.9 1 each 0    carvediloL (COREG) 3.125 MG tablet TAKE 1 TABLET TWICE DAILY WITH MEALS 180 tablet 1    cholecalciferol, vitamin D3, (VITAMIN D3) 25 mcg (1,000 unit) capsule Take 2,000 Units by mouth 2 (two) times daily.      dulaglutide (TRULICITY)  "1.5 mg/0.5 mL pen injector INJECT 1.5 MG INTO THE SKIN EVERY 7 DAYS. 12 pen 1    esomeprazole (NEXIUM) 40 MG capsule Take 1 capsule (40 mg total) by mouth once daily. 90 capsule 1    fluticasone propionate (FLONASE) 50 mcg/actuation nasal spray 2 sprays (100 mcg total) by Each Nostril route once daily. 48 g 2    furosemide (LASIX) 40 MG tablet Take 1 tablet (40 mg total) by mouth once daily. 90 tablet 1    gabapentin (NEURONTIN) 100 MG capsule Take 1 capsule (100 mg total) by mouth every 8 (eight) hours. 270 capsule 2    HYDROcodone-acetaminophen (NORCO) 7.5-325 mg per tablet Take 1 tablet by mouth every 8 (eight) hours as needed for Pain. 90 tablet 0    [START ON 3/27/2023] HYDROcodone-acetaminophen (NORCO) 7.5-325 mg per tablet Take 1 tablet by mouth every 8 (eight) hours as needed for Pain. 90 tablet 0    [START ON 4/26/2023] HYDROcodone-acetaminophen (NORCO) 7.5-325 mg per tablet Take 1 tablet by mouth every 8 (eight) hours as needed for Pain. 90 tablet 0    icosapent ethyL (VASCEPA) 0.5 gram Cap Take 1 capsule by mouth 2 (two) times a day. 180 capsule 1    insulin degludec (TRESIBA FLEXTOUCH U-100) 100 unit/mL (3 mL) insulin pen INJECT 10 UNITS SUBCUTANEOUSLY EVERY DAY 3 pen 2    lancets (TRUEPLUS LANCETS) 33 gauge Misc CHECK BLOOD SUGAR TWICE DAILY FOR DIABETES 100 each 5    levothyroxine (SYNTHROID) 75 MCG tablet Take 1 tablet (75 mcg total) by mouth once daily. 90 tablet 1    lisinopriL (PRINIVIL,ZESTRIL) 5 MG tablet Take 1 tablet (5 mg total) by mouth once daily. 90 tablet 1    methocarbamoL (ROBAXIN) 500 MG Tab Take 1 tablet (500 mg total) by mouth 2 (two) times daily. 180 tablet 1    montelukast (SINGULAIR) 10 mg tablet Take 1 tablet (10 mg total) by mouth every evening. 90 tablet 1    nitroGLYCERIN (NITROSTAT) 0.4 MG SL tablet Place 1 tablet (0.4 mg total) under the tongue every 5 (five) minutes as needed for Chest pain. 30 tablet 2    pen needle, diabetic 32 gauge x 1/4" Ndle Use daily to check blood " glucose 100 each 2    rosuvastatin (CRESTOR) 40 MG Tab Take 1 tablet (40 mg total) by mouth every evening. 90 tablet 1    sertraline (ZOLOFT) 50 MG tablet Take 1 tablet (50 mg total) by mouth once daily. 90 tablet 1    spironolactone (ALDACTONE) 25 MG tablet Take 1 tablet (25 mg total) by mouth once daily. 90 tablet 1     No current facility-administered medications on file prior to visit.     Immunization History   Administered Date(s) Administered    COVID-19, MRNA, LN-S, PF (MODERNA FULL 0.5 ML DOSE) 03/10/2021, 04/07/2021    Influenza (FLUAD) - Quadrivalent - Adjuvanted - PF *Preferred* (65+) 10/27/2022    Influenza - Quadrivalent - High Dose - PF (65 years and older) 10/06/2021    Pneumococcal Conjugate - 20 Valent 01/18/2023       Review of Systems   Constitutional:  Negative for fever, malaise/fatigue and weight loss.   Respiratory:  Negative for shortness of breath.    Cardiovascular:  Negative for chest pain and palpitations.   Gastrointestinal:  Negative for nausea and vomiting.   Psychiatric/Behavioral:  Negative for depression.       Vitals:    02/28/23 1006   BP: 92/60   Pulse:    Resp:        Physical Exam  Eyes:      Conjunctiva/sclera: Conjunctivae normal.   Pulmonary:      Effort: Pulmonary effort is normal.   Neurological:      Mental Status: She is alert.   Psychiatric:         Mood and Affect: Mood normal.        Assessment and Plan  Lung nodule seen on imaging study  -     Ambulatory referral/consult to Pulmonology; Future; Expected date: 03/07/2023            Return to clinic in 3 months or as needed.    Health Maintenance Topics with due status: Not Due       Topic Last Completion Date    Lipid Panel 07/21/2022    DEXA Scan 08/29/2022    Diabetes Urine Screening 10/27/2022    Hemoglobin A1c 01/18/2023

## 2023-03-22 ENCOUNTER — OFFICE VISIT (OUTPATIENT)
Dept: FAMILY MEDICINE | Facility: CLINIC | Age: 83
End: 2023-03-22
Payer: MEDICARE

## 2023-03-22 VITALS
HEART RATE: 69 BPM | RESPIRATION RATE: 19 BRPM | DIASTOLIC BLOOD PRESSURE: 60 MMHG | OXYGEN SATURATION: 96 % | BODY MASS INDEX: 24.8 KG/M2 | HEIGHT: 63 IN | SYSTOLIC BLOOD PRESSURE: 112 MMHG | WEIGHT: 140 LBS | TEMPERATURE: 98 F

## 2023-03-22 DIAGNOSIS — J44.9 CHRONIC OBSTRUCTIVE PULMONARY DISEASE, UNSPECIFIED COPD TYPE: ICD-10-CM

## 2023-03-22 DIAGNOSIS — R05.1 ACUTE COUGH: Primary | ICD-10-CM

## 2023-03-22 DIAGNOSIS — J06.9 UPPER RESPIRATORY TRACT INFECTION, UNSPECIFIED TYPE: ICD-10-CM

## 2023-03-22 DIAGNOSIS — E11.69 TYPE 2 DIABETES MELLITUS WITH OTHER SPECIFIED COMPLICATION, WITHOUT LONG-TERM CURRENT USE OF INSULIN: ICD-10-CM

## 2023-03-22 DIAGNOSIS — N18.31 CHRONIC KIDNEY DISEASE, STAGE 3A: ICD-10-CM

## 2023-03-22 PROCEDURE — 3078F PR MOST RECENT DIASTOLIC BLOOD PRESSURE < 80 MM HG: ICD-10-PCS | Mod: ,,, | Performed by: NURSE PRACTITIONER

## 2023-03-22 PROCEDURE — 1101F PT FALLS ASSESS-DOCD LE1/YR: CPT | Mod: ,,, | Performed by: NURSE PRACTITIONER

## 2023-03-22 PROCEDURE — 1160F PR REVIEW ALL MEDS BY PRESCRIBER/CLIN PHARMACIST DOCUMENTED: ICD-10-PCS | Mod: ,,, | Performed by: NURSE PRACTITIONER

## 2023-03-22 PROCEDURE — 3074F SYST BP LT 130 MM HG: CPT | Mod: ,,, | Performed by: NURSE PRACTITIONER

## 2023-03-22 PROCEDURE — 1160F RVW MEDS BY RX/DR IN RCRD: CPT | Mod: ,,, | Performed by: NURSE PRACTITIONER

## 2023-03-22 PROCEDURE — 3288F PR FALLS RISK ASSESSMENT DOCUMENTED: ICD-10-PCS | Mod: ,,, | Performed by: NURSE PRACTITIONER

## 2023-03-22 PROCEDURE — 1125F AMNT PAIN NOTED PAIN PRSNT: CPT | Mod: ,,, | Performed by: NURSE PRACTITIONER

## 2023-03-22 PROCEDURE — 1159F PR MEDICATION LIST DOCUMENTED IN MEDICAL RECORD: ICD-10-PCS | Mod: ,,, | Performed by: NURSE PRACTITIONER

## 2023-03-22 PROCEDURE — 99212 OFFICE O/P EST SF 10 MIN: CPT | Mod: ,,, | Performed by: NURSE PRACTITIONER

## 2023-03-22 PROCEDURE — 1125F PR PAIN SEVERITY QUANTIFIED, PAIN PRESENT: ICD-10-PCS | Mod: ,,, | Performed by: NURSE PRACTITIONER

## 2023-03-22 PROCEDURE — 1159F MED LIST DOCD IN RCRD: CPT | Mod: ,,, | Performed by: NURSE PRACTITIONER

## 2023-03-22 PROCEDURE — 3074F PR MOST RECENT SYSTOLIC BLOOD PRESSURE < 130 MM HG: ICD-10-PCS | Mod: ,,, | Performed by: NURSE PRACTITIONER

## 2023-03-22 PROCEDURE — 1101F PR PT FALLS ASSESS DOC 0-1 FALLS W/OUT INJ PAST YR: ICD-10-PCS | Mod: ,,, | Performed by: NURSE PRACTITIONER

## 2023-03-22 PROCEDURE — 3288F FALL RISK ASSESSMENT DOCD: CPT | Mod: ,,, | Performed by: NURSE PRACTITIONER

## 2023-03-22 PROCEDURE — 3078F DIAST BP <80 MM HG: CPT | Mod: ,,, | Performed by: NURSE PRACTITIONER

## 2023-03-22 PROCEDURE — 99212 PR OFFICE/OUTPT VISIT, EST, LEVL II, 10-19 MIN: ICD-10-PCS | Mod: ,,, | Performed by: NURSE PRACTITIONER

## 2023-03-22 RX ORDER — AZITHROMYCIN 250 MG/1
TABLET, FILM COATED ORAL
Qty: 6 TABLET | Refills: 0 | Status: SHIPPED | OUTPATIENT
Start: 2023-03-22 | End: 2023-03-27

## 2023-03-22 RX ORDER — DIGOXIN 125 MCG
125 TABLET ORAL DAILY
COMMUNITY
Start: 2023-03-10

## 2023-03-22 NOTE — PROGRESS NOTES
JOSELITO Mora        PATIENT NAME: Sharee Elizondo  : 1940  DATE: 3/22/23  MRN: [id number]      Billing Provider: JOSELITO Mora  Level of Service: CO OFFICE/OUTPT VISIT, EST, LEVL II, 10-19 MIN  Patient PCP Information       Provider PCP Type    JOSELITO Mora General            Reason for Visit / Chief Complaint: Sore Throat (All symptoms started yesterday. Has been using nasal spray), Cough, Nasal Congestion, and Chest Congestion         History of Present Illness / Problem Focused Workflow     Sharee Elizondo presents to the clinic with Sore Throat (All symptoms started yesterday. Has been using nasal spray), Cough, Nasal Congestion, and Chest Congestion     Sore Throat   This is a new problem. The current episode started yesterday. The problem has been gradually worsening. Neither side of throat is experiencing more pain than the other. The maximum temperature recorded prior to her arrival was 100.4 - 100.9 F. The pain is mild. Associated symptoms include coughing. Pertinent negatives include no shortness of breath or trouble swallowing. She has tried nothing for the symptoms. The treatment provided no relief.   Cough  This is a new problem. The current episode started yesterday. The problem has been gradually worsening. The problem occurs every few minutes. The cough is Productive of purulent sputum. Associated symptoms include chills, a fever, nasal congestion, rhinorrhea and a sore throat. Pertinent negatives include no shortness of breath or wheezing. Nothing aggravates the symptoms. Her past medical history is significant for COPD. There is no history of asthma.     Review of Systems     Review of Systems   Constitutional:  Positive for chills, fatigue and fever.   HENT:  Positive for rhinorrhea and sore throat. Negative for sinus pressure/congestion and trouble swallowing.    Respiratory:  Positive for cough. Negative for chest tightness, shortness of breath and wheezing.     Cardiovascular: Negative.    Gastrointestinal: Negative.    Genitourinary: Negative.    Neurological: Negative.    Psychiatric/Behavioral: Negative.        Medical / Social / Family History     Past Medical History:   Diagnosis Date    Acute superficial gastritis without hemorrhage 2/18/2022    Atrial fibrillation     Automatic implantable cardiac defibrillator in situ     Bilateral primary osteoarthritis of knee     Cervical spondylosis without myelopathy     CHF (congestive heart failure)     Chronic ischemic heart disease     Chronic kidney disease (CKD), stage III (moderate)     Chronic pain syndrome     COPD (chronic obstructive pulmonary disease)     Coronary arteriosclerosis     Depressive disorder     Esophageal dysphagia 2/18/2022    GERD (gastroesophageal reflux disease)     HH (hiatus hernia) 2/18/2022    Hypothyroidism     Low back pain     Lumbar radiculopathy     Melanoma 2019    scalp    Metabolic syndrome     Myocardial ischemia     Other long term (current) drug therapy     Radiculopathy, cervical region     Type 2 diabetes mellitus        Past Surgical History:   Procedure Laterality Date    BACK SURGERY      bilateral knee injection/ aspiration of joint/ bursa-large  Bilateral 4/4/2017 3/27/2017 3/20/2017    bilateral knee orthovisc injection    CARDIAC SURGERY      EPIDURAL STEROID INJECTION INTO CERVICAL SPINE  6/19/2019 5/17/2017 4/26/2017    C6-7 LONNIEDr Farah    EPIDURAL STEROID INJECTION INTO LUMBAR SPINE  03/15/2018    L4-5 LONNIEDr Farah    HYSTERECTOMY      INJECTION OF ANESTHETIC AGENT AROUND MEDIAL BRANCH NERVES INNERVATING LUMBAR FACET JOINT Bilateral 4/14/2022    Procedure: Block-nerve-medial branch-lumbar, bilateral L4 through S1;  Surgeon: Angie Farah MD;  Location: [facility];  Service: Pain Management;  Laterality: Bilateral;  pt aware at visit to be tested  4/12 patient getting covid test today    INJECTION OF FACET JOINT Bilateral 8/17/2017 7/19/2017    Bilateral  C3-7 FI, Dr Farah    INJECTION OF FACET JOINT Left 10/18/2018    left C3-7 FI, Dr Farah    INJECTION OF FACET JOINT Bilateral 8/22/2018 6/29/2018    bilateral L3-S1 FI, Dr Farah    RADIOFREQUENCY ABLATION Right 12/12/2018    Right C3-7 RF, Dr Farah    RADIOFREQUENCY ABLATION Left 01/9/2019 10/16/2017 10/7/2020    Left C3-7 RF, Dr Farah    RADIOFREQUENCY ABLATION Left 1/9/2019 10/16/2017    left C3-7 RF, Dr Farah    RADIOFREQUENCY THERMAL COAGULATION OF MEDIAL BRANCH OF POSTERIOR RAMUS OF CERVICAL SPINAL NERVE Left 8/5/2021    Procedure: RADIOFREQUENCY THERMAL COAGULATION, NERVE, SPINAL, CERVICAL, POSTERIOR RAMUS, MEDIAL BRANCH, Left C3-4,4-5  only 2 levels resubmitted dt denied all levels on 8-3-2021;  Surgeon: Angie Farah MD;  Location: [facility];  Service: Pain Management;  Laterality: Left;    RADIOFREQUENCY THERMAL COAGULATION OF MEDIAL BRANCH OF POSTERIOR RAMUS OF CERVICAL SPINAL NERVE Right 8/24/2021    Procedure: RADIOFREQUENCY THERMAL COAGULATION, NERVE, SPINAL, CERVICAL, POSTERIOR RAMUS, MEDIAL BRANCH RIGHT C3-C5 RFTC;  Surgeon: Angie Farah MD;  Location: [facility];  Service: Pain Management;  Laterality: Right;  HAD VAC WILL BRING CARD    TRANSFORAMINAL EPIDURAL INJECTION OF STEROID Right 5/30/2018 5/2/2018    Right L4-5 ABNER, Dr Farah       Social History  Ms. Sharee Elizondo   reports that she has been smoking cigarettes. She has a 20.00 pack-year smoking history. She has been exposed to tobacco smoke. She has never used smokeless tobacco. She reports that she does not currently use alcohol. She reports that she does not use drugs.    Family History  Ms.'s Sharee Elizondo  family history includes Diabetes Mellitus in her mother; Hypertension in her mother.    Medications and Allergies     Medications  Outpatient Medications Marked as Taking for the 3/22/23 encounter (Office Visit) with JOSELITO Mora   Medication Sig Dispense Refill    amitriptyline (ELAVIL) 25 MG tablet  Take 1 tablet (25 mg total) by mouth nightly as needed for Insomnia. 90 tablet 1    ascorbic acid, vitamin C, (VITAMIN C) 500 MG tablet Take 500 mg by mouth once daily.      blood sugar diagnostic (TRUE METRIX GLUCOSE TEST STRIP) Strp TEST BLOOD SUGAR TWICE DAILY FOR DIABETES 200 strip 5    blood-glucose meter Misc Use to check blood glucose twice daily for diabetes, E11.9 1 each 0    carvediloL (COREG) 3.125 MG tablet TAKE 1 TABLET TWICE DAILY WITH MEALS 180 tablet 1    cholecalciferol, vitamin D3, (VITAMIN D3) 25 mcg (1,000 unit) capsule Take 2,000 Units by mouth 2 (two) times daily.      digoxin (LANOXIN) 125 mcg tablet Take 125 mcg by mouth once daily.      dulaglutide (TRULICITY) 1.5 mg/0.5 mL pen injector INJECT 1.5 MG INTO THE SKIN EVERY 7 DAYS. 12 pen 1    esomeprazole (NEXIUM) 40 MG capsule Take 1 capsule (40 mg total) by mouth once daily. 90 capsule 1    fluticasone propionate (FLONASE) 50 mcg/actuation nasal spray 2 sprays (100 mcg total) by Each Nostril route once daily. 48 g 2    furosemide (LASIX) 40 MG tablet Take 1 tablet (40 mg total) by mouth once daily. 90 tablet 1    gabapentin (NEURONTIN) 100 MG capsule Take 1 capsule (100 mg total) by mouth every 8 (eight) hours. 270 capsule 2    HYDROcodone-acetaminophen (NORCO) 7.5-325 mg per tablet Take 1 tablet by mouth every 8 (eight) hours as needed for Pain. 90 tablet 0    [START ON 3/27/2023] HYDROcodone-acetaminophen (NORCO) 7.5-325 mg per tablet Take 1 tablet by mouth every 8 (eight) hours as needed for Pain. 90 tablet 0    [START ON 4/26/2023] HYDROcodone-acetaminophen (NORCO) 7.5-325 mg per tablet Take 1 tablet by mouth every 8 (eight) hours as needed for Pain. 90 tablet 0    icosapent ethyL (VASCEPA) 0.5 gram Cap Take 1 capsule by mouth 2 (two) times a day. 180 capsule 1    insulin degludec (TRESIBA FLEXTOUCH U-100) 100 unit/mL (3 mL) insulin pen INJECT 10 UNITS SUBCUTANEOUSLY EVERY DAY 3 pen 2    lancets (TRUEPLUS LANCETS) 33 gauge Misc CHECK  "BLOOD SUGAR TWICE DAILY FOR DIABETES 100 each 5    levothyroxine (SYNTHROID) 75 MCG tablet Take 1 tablet (75 mcg total) by mouth once daily. 90 tablet 1    lisinopriL (PRINIVIL,ZESTRIL) 5 MG tablet Take 1 tablet (5 mg total) by mouth once daily. 90 tablet 1    methocarbamoL (ROBAXIN) 500 MG Tab Take 1 tablet (500 mg total) by mouth 2 (two) times daily. 180 tablet 1    montelukast (SINGULAIR) 10 mg tablet Take 1 tablet (10 mg total) by mouth every evening. 90 tablet 1    nitroGLYCERIN (NITROSTAT) 0.4 MG SL tablet Place 1 tablet (0.4 mg total) under the tongue every 5 (five) minutes as needed for Chest pain. 30 tablet 2    pen needle, diabetic 32 gauge x 1/4" Ndle Use daily to check blood glucose 100 each 2    rosuvastatin (CRESTOR) 40 MG Tab Take 1 tablet (40 mg total) by mouth every evening. 90 tablet 1    sertraline (ZOLOFT) 50 MG tablet Take 1 tablet (50 mg total) by mouth once daily. 90 tablet 1    spironolactone (ALDACTONE) 25 MG tablet Take 1 tablet (25 mg total) by mouth once daily. 90 tablet 1       Allergies  Review of patient's allergies indicates:  No Known Allergies      Vitals:    03/22/23 1314   BP: 112/60   Pulse: 69   Resp: 19   Temp: 98.4 °F (36.9 °C)     Physical Exam  Vitals and nursing note reviewed.   Constitutional:       General: She is not in acute distress.     Appearance: Normal appearance.   HENT:      Head: Normocephalic.      Mouth/Throat:      Mouth: Mucous membranes are moist.      Pharynx: Posterior oropharyngeal erythema present. No oropharyngeal exudate.   Cardiovascular:      Rate and Rhythm: Normal rate and regular rhythm.      Pulses: Normal pulses.      Heart sounds: Normal heart sounds.   Pulmonary:      Effort: Pulmonary effort is normal.      Breath sounds: Normal breath sounds. No wheezing or rhonchi.   Abdominal:      Palpations: Abdomen is soft.   Musculoskeletal:         General: Normal range of motion.      Cervical back: Normal range of motion.   Skin:     General: Skin " is warm and dry.   Neurological:      Mental Status: She is alert and oriented to person, place, and time.   Psychiatric:         Behavior: Behavior normal.          Lab Results   Component Value Date    WBC 8.22 01/18/2023    HGB 11.5 (L) 01/18/2023    HCT 35.5 (L) 01/18/2023    MCV 94.9 01/18/2023     01/18/2023          Sodium   Date Value Ref Range Status   01/18/2023 138 136 - 145 mmol/L Final     Potassium   Date Value Ref Range Status   01/18/2023 3.3 (L) 3.5 - 5.1 mmol/L Final     Chloride   Date Value Ref Range Status   01/18/2023 100 98 - 107 mmol/L Final     CO2   Date Value Ref Range Status   01/18/2023 28 21 - 32 mmol/L Final     Glucose   Date Value Ref Range Status   01/18/2023 152 (H) 74 - 106 mg/dL Final     BUN   Date Value Ref Range Status   01/18/2023 17 7 - 18 mg/dL Final     Creatinine   Date Value Ref Range Status   01/18/2023 1.18 (H) 0.55 - 1.02 mg/dL Final     Calcium   Date Value Ref Range Status   01/18/2023 9.5 8.5 - 10.1 mg/dL Final     Total Protein   Date Value Ref Range Status   01/18/2023 6.7 6.4 - 8.2 g/dL Final     Albumin   Date Value Ref Range Status   01/18/2023 3.8 3.5 - 5.0 g/dL Final     Bilirubin, Total   Date Value Ref Range Status   01/18/2023 0.4 >0.0 - 1.2 mg/dL Final     Alk Phos   Date Value Ref Range Status   01/18/2023 89 55 - 142 U/L Final     AST   Date Value Ref Range Status   01/18/2023 11 (L) 15 - 37 U/L Final     ALT   Date Value Ref Range Status   01/18/2023 13 13 - 56 U/L Final     Anion Gap   Date Value Ref Range Status   01/18/2023 13 7 - 16 mmol/L Final     eGFR   Date Value Ref Range Status   01/18/2023 46 (L) >=60 mL/min/1.73m² Final        Lab Results   Component Value Date    CHOL 136 07/21/2022    CHOL 294 (H) 02/03/2022    CHOL 168 08/03/2021     Lab Results   Component Value Date    HDL 39 (L) 07/21/2022    HDL 35 (L) 02/03/2022    HDL 36 (L) 08/03/2021     Lab Results   Component Value Date    LDLCALC 52 07/21/2022    LDLCALC 200 02/03/2022     LDLCALC 74 08/03/2021     Lab Results   Component Value Date    TRIG 223 (H) 07/21/2022    TRIG 296 (H) 02/03/2022    TRIG 291 (H) 08/03/2021     Lab Results   Component Value Date    CHOLHDL 3.5 07/21/2022    CHOLHDL 8.4 02/03/2022    CHOLHDL 4.7 08/03/2021        Lab Results   Component Value Date    HGBA1C 6.4 01/18/2023        Lab Results   Component Value Date    TSH 0.552 01/18/2023    FREET4 1.21 01/18/2023        No results found for: PSA, PSATOTAL, PSAFREE, PSAFREEPCT       Health Maintenance Due   Topic Date Due    Sign Pain Contract  Never done    Aspirin/Antiplatelet Therapy  Never done       Problem List Items Addressed This Visit          Pulmonary    Chronic obstructive pulmonary disease, unspecified COPD type       Renal/    Chronic kidney disease, stage 3a       Endocrine    Type 2 diabetes mellitus with other specified complication, without long-term current use of insulin     Other Visit Diagnoses       Acute cough    -  Primary    Relevant Orders    X-Ray Chest PA And Lateral          CXR negative for infiltrate - take zpack as directed. RTC if symptoms worsen or persist.      Health Maintenance Topics with due status: Not Due       Topic Last Completion Date    Lipid Panel 07/21/2022    DEXA Scan 08/29/2022    Diabetes Urine Screening 10/27/2022    Hemoglobin A1c 01/18/2023       Future Appointments   Date Time Provider Department Center   5/25/2023 10:15 AM [facility]        There are no Patient Instructions on file for this visit.  No follow-ups on file.       Date of encounter: 3/22/23

## 2023-03-29 ENCOUNTER — TELEPHONE (OUTPATIENT)
Dept: PAIN MEDICINE | Facility: CLINIC | Age: 83
End: 2023-03-29
Payer: MEDICARE

## 2023-03-29 NOTE — TELEPHONE ENCOUNTER
----- Message from Kerry Caldera sent at 3/29/2023  2:19 PM CDT -----  Regarding: pt needs to speak to nurse regarding a procedure apt  Pt needs to speak with nurse regarding a conversation about a possible procedure please call pt back at [phone]  MAISHA SANTORO   03/29/2023   2:49 PM    Spoke with patient will need to schedule appt and see Pop before scheduling procedure. Voiced understanding.

## 2023-03-30 RX ORDER — HYDROCODONE BITARTRATE AND ACETAMINOPHEN 7.5; 325 MG/1; MG/1
1 TABLET ORAL EVERY 8 HOURS PRN
Qty: 90 TABLET | Refills: 0 | Status: CANCELLED | OUTPATIENT
Start: 2023-03-30

## 2023-03-30 RX ORDER — HYDROCODONE BITARTRATE AND ACETAMINOPHEN 7.5; 325 MG/1; MG/1
1 TABLET ORAL EVERY 8 HOURS PRN
Qty: 90 TABLET | Refills: 0 | Status: CANCELLED | OUTPATIENT
Start: 2023-04-26

## 2023-03-30 NOTE — PROGRESS NOTES
Subjective:         Patient ID: Sharee Elizondo is a 83 y.o. female.    Chief Complaint: Neck Pain and Knee Pain        Pain  This is a chronic problem. The current episode started more than 1 year ago. The problem occurs daily. The problem has been waxing and waning. Associated symptoms include arthralgias and neck pain. Pertinent negatives include no anorexia, chest pain, chills, coughing, diaphoresis, fever, sore throat, urinary symptoms, vertigo or vomiting.   Review of Systems   Constitutional:  Negative for activity change, appetite change, chills, diaphoresis, fever and unexpected weight change.   HENT:  Negative for drooling, ear discharge, ear pain, facial swelling, mouth dryness, nosebleeds, sore throat, trouble swallowing, voice change and goiter.    Eyes:  Negative for photophobia, pain, discharge, redness and visual disturbance.   Respiratory:  Negative for apnea, cough, choking, chest tightness, shortness of breath, wheezing and stridor.    Cardiovascular:  Negative for chest pain, palpitations and leg swelling.   Gastrointestinal:  Negative for abdominal distention, anorexia, diarrhea, rectal pain, vomiting and fecal incontinence.   Endocrine: Negative for cold intolerance, heat intolerance, polydipsia, polyphagia and polyuria.   Genitourinary:  Negative for bladder incontinence, dysuria, flank pain, frequency and hot flashes.   Musculoskeletal:  Positive for arthralgias, back pain, leg pain and neck pain.   Integumentary:  Negative for color change and pallor.   Allergic/Immunologic: Negative for immunocompromised state.   Neurological:  Negative for dizziness, vertigo, seizures, syncope, facial asymmetry, speech difficulty, light-headedness, coordination difficulties, memory loss and coordination difficulties.   Hematological:  Negative for adenopathy. Does not bruise/bleed easily.   Psychiatric/Behavioral:  Negative for agitation, behavioral problems, confusion, decreased concentration, dysphoric mood,  hallucinations, self-injury and suicidal ideas. The patient is not nervous/anxious and is not hyperactive.          Past Medical History:   Diagnosis Date    Acute superficial gastritis without hemorrhage 2/18/2022    Atrial fibrillation     Automatic implantable cardiac defibrillator in situ     Bilateral primary osteoarthritis of knee     Cervical spondylosis without myelopathy     CHF (congestive heart failure)     Chronic ischemic heart disease     Chronic kidney disease (CKD), stage III (moderate)     Chronic pain syndrome     COPD (chronic obstructive pulmonary disease)     Coronary arteriosclerosis     Depressive disorder     Esophageal dysphagia 2/18/2022    GERD (gastroesophageal reflux disease)     HH (hiatus hernia) 2/18/2022    Hypothyroidism     Low back pain     Lumbar radiculopathy     Melanoma 2019    scalp    Metabolic syndrome     Myocardial ischemia     Other long term (current) drug therapy     Radiculopathy, cervical region     Type 2 diabetes mellitus      Past Surgical History:   Procedure Laterality Date    BACK SURGERY      bilateral knee injection/ aspiration of joint/ bursa-large  Bilateral 4/4/2017 3/27/2017 3/20/2017    bilateral knee orthovisc injection    CARDIAC SURGERY      EPIDURAL STEROID INJECTION INTO CERVICAL SPINE  6/19/2019 5/17/2017 4/26/2017    C6-7 LONNIE, Dr Farah    EPIDURAL STEROID INJECTION INTO LUMBAR SPINE  03/15/2018    L4-5 LONNIE, Dr Farah    HYSTERECTOMY      INJECTION OF ANESTHETIC AGENT AROUND MEDIAL BRANCH NERVES INNERVATING LUMBAR FACET JOINT Bilateral 4/14/2022    Procedure: Block-nerve-medial branch-lumbar, bilateral L4 through S1;  Surgeon: Angie Farah MD;  Location: [facility];  Service: Pain Management;  Laterality: Bilateral;  pt aware at visit to be tested  4/12 patient getting covid test today    INJECTION OF FACET JOINT Bilateral 8/17/2017 7/19/2017    Bilateral C3-7 FIDr Farah    INJECTION OF FACET JOINT Left 10/18/2018    left C3-7 FI,  "Dr Farah    INJECTION OF FACET JOINT Bilateral 8/22/2018 6/29/2018    bilateral L3-S1 FI, Dr Farah    RADIOFREQUENCY ABLATION Right 12/12/2018    Right C3-7 RF, Dr Farah    RADIOFREQUENCY ABLATION Left 01/9/2019 10/16/2017 10/7/2020    Left C3-7 RF, Dr Farah    RADIOFREQUENCY ABLATION Left 1/9/2019 10/16/2017    left C3-7 RF, Dr Farah    RADIOFREQUENCY THERMAL COAGULATION OF MEDIAL BRANCH OF POSTERIOR RAMUS OF CERVICAL SPINAL NERVE Left 8/5/2021    Procedure: RADIOFREQUENCY THERMAL COAGULATION, NERVE, SPINAL, CERVICAL, POSTERIOR RAMUS, MEDIAL BRANCH, Left C3-4,4-5  only 2 levels resubmitted dt denied all levels on 8-3-2021;  Surgeon: Angie Farah MD;  Location: [facility];  Service: Pain Management;  Laterality: Left;    RADIOFREQUENCY THERMAL COAGULATION OF MEDIAL BRANCH OF POSTERIOR RAMUS OF CERVICAL SPINAL NERVE Right 8/24/2021    Procedure: RADIOFREQUENCY THERMAL COAGULATION, NERVE, SPINAL, CERVICAL, POSTERIOR RAMUS, MEDIAL BRANCH RIGHT C3-C5 RFTC;  Surgeon: Angie Farah MD;  [facility];  Service: Pain Management;  Laterality: Right;  HAD VAC WILL BRING CARD    TRANSFORAMINAL EPIDURAL INJECTION OF STEROID Right 5/30/2018 5/2/2018    Right L4-5 TFESI, Dr Farah     Social History     Socioeconomic History    Marital status:    Tobacco Use    Smoking status: Every Day     Packs/day: 0.50     Years: 40.00     Pack years: 20.00     Types: Cigarettes     Passive exposure: Current    Smokeless tobacco: Never   Substance and Sexual Activity    Alcohol use: Not Currently    Drug use: Never     Family History   Problem Relation Age of Onset    Hypertension Mother     Diabetes Mellitus Mother      Review of patient's allergies indicates:  No Known Allergies     Objective:  Vitals:    04/03/23 0949   BP: (!) 124/55   Pulse: 80   Resp: 20   Weight: 63.5 kg (140 lb)   Height: 5' 3" (1.6 m)   PainSc:   6           Physical Exam  Vitals and nursing note reviewed. Exam conducted " with a chaperone present.   Constitutional:       General: She is awake. She is not in acute distress.     Appearance: Normal appearance. She is not ill-appearing or toxic-appearing.   HENT:      Head: Normocephalic and atraumatic.      Nose: Nose normal.      Mouth/Throat:      Mouth: Mucous membranes are moist.      Pharynx: Oropharynx is clear.   Eyes:      Conjunctiva/sclera: Conjunctivae normal.      Pupils: Pupils are equal, round, and reactive to light.   Pulmonary:      Effort: Pulmonary effort is normal. No respiratory distress.   Abdominal:      Palpations: Abdomen is soft.      Tenderness: There is no guarding.   Musculoskeletal:         General: Normal range of motion.      Cervical back: Normal range of motion and neck supple. Tenderness present. No rigidity.      Thoracic back: Tenderness present.      Lumbar back: Tenderness present.   Skin:     General: Skin is warm and dry.      Coloration: Skin is not jaundiced or pale.   Neurological:      General: No focal deficit present.      Mental Status: She is alert and oriented to person, place, and time. Mental status is at baseline.      Cranial Nerves: No cranial nerve deficit (II-XII).      Gait: Gait abnormal.   Psychiatric:         Mood and Affect: Mood normal.         Behavior: Behavior normal. Behavior is cooperative.         Thought Content: Thought content normal.         X-Ray Chest PA And Lateral  Narrative: EXAMINATION:  XR CHEST PA AND LATERAL    CLINICAL HISTORY:  Acute cough    TECHNIQUE:  PA and lateral chest:    COMPARISON:  01/29/2015, 09/25/2019, 10/04/2019, 07/29/2020, and 12/29/2022    FINDINGS:  There is mild cardiomegaly.  Sternal wires and mediastinal clips are present.  There is a defibrillator terminal over the left upper chest wall with a single ventricular lead.  There is no suggestion of failure.  No infiltrates or effusions are seen.  Degenerative changes are present in the thoracic spine.  Impression: Chronic changes are  present but there is no evidence of acute disease.    Place of service: [facility]    Electronically signed by: Isaura Mahmood  Date:    03/22/2023  Time:    14:01         Office Visit on 02/22/2023   Component Date Value Ref Range Status    POC Amphetamines 02/22/2023 Negative  Negative, Inconclusive Final    POC Barbiturates 02/22/2023 Negative  Negative, Inconclusive Final    POC Benzodiazepines 02/22/2023 Negative  Negative, Inconclusive Final    POC Cocaine 02/22/2023 Negative  Negative, Inconclusive Final    POC THC 02/22/2023 Negative  Negative, Inconclusive Final    POC Methadone 02/22/2023 Negative  Negative, Inconclusive Final    POC Methamphetamine 02/22/2023 Negative  Negative, Inconclusive Final    POC Opiates 02/22/2023 Presumptive Positive (A)  Negative, Inconclusive Final    POC Oxycodone 02/22/2023 Negative  Negative, Inconclusive Final    POC Phencyclidine 02/22/2023 Negative  Negative, Inconclusive Final    POC Methylenedioxymethamphetamine * 02/22/2023 Negative  Negative, Inconclusive Final    POC Tricyclic Antidepressants 02/22/2023 Negative  Negative, Inconclusive Final    POC Buprenorphine 02/22/2023 Negative   Final     Acceptable 02/22/2023 Yes   Final    POC Temperature (Urine) 02/22/2023 90   Final   Office Visit on 01/18/2023   Component Date Value Ref Range Status    Hemoglobin A1C 01/18/2023 6.4  4.5 - 6.6 % Final    Estimated Average Glucose 01/18/2023 127  mg/dL Final    Free T4 01/18/2023 1.21  0.76 - 1.46 ng/dL Final    TSH 01/18/2023 0.552  0.358 - 3.740 uIU/mL Final    Sodium 01/18/2023 138  136 - 145 mmol/L Final    Potassium 01/18/2023 3.3 (L)  3.5 - 5.1 mmol/L Final    Chloride 01/18/2023 100  98 - 107 mmol/L Final    CO2 01/18/2023 28  21 - 32 mmol/L Final    Anion Gap 01/18/2023 13  7 - 16 mmol/L Final    Glucose 01/18/2023 152 (H)  74 - 106 mg/dL Final    BUN 01/18/2023 17  7 - 18 mg/dL Final    Creatinine 01/18/2023 1.18 (H)  0.55 - 1.02 mg/dL  Final    BUN/Creatinine Ratio 01/18/2023 14  6 - 20 Final    Calcium 01/18/2023 9.5  8.5 - 10.1 mg/dL Final    Total Protein 01/18/2023 6.7  6.4 - 8.2 g/dL Final    Albumin 01/18/2023 3.8  3.5 - 5.0 g/dL Final    Globulin 01/18/2023 2.9  2.0 - 4.0 g/dL Final    A/G Ratio 01/18/2023 1.3   Final    Bilirubin, Total 01/18/2023 0.4  >0.0 - 1.2 mg/dL Final    Alk Phos 01/18/2023 89  55 - 142 U/L Final    ALT 01/18/2023 13  13 - 56 U/L Final    AST 01/18/2023 11 (L)  15 - 37 U/L Final    eGFR 01/18/2023 46 (L)  >=60 mL/min/1.73m² Final    WBC 01/18/2023 8.22  4.50 - 11.00 K/uL Final    RBC 01/18/2023 3.74 (L)  4.20 - 5.40 M/uL Final    Hemoglobin 01/18/2023 11.5 (L)  12.0 - 16.0 g/dL Final    Hematocrit 01/18/2023 35.5 (L)  38.0 - 47.0 % Final    MCV 01/18/2023 94.9  80.0 - 96.0 fL Final    MCH 01/18/2023 30.7  27.0 - 31.0 pg Final    MCHC 01/18/2023 32.4  32.0 - 36.0 g/dL Final    RDW 01/18/2023 13.1  11.5 - 14.5 % Final    Platelet Count 01/18/2023 260  150 - 400 K/uL Final    MPV 01/18/2023 10.7  9.4 - 12.4 fL Final    Neutrophils % 01/18/2023 60.0  53.0 - 65.0 % Final    Lymphocytes % 01/18/2023 26.4 (L)  27.0 - 41.0 % Final    Monocytes % 01/18/2023 7.1 (H)  2.0 - 6.0 % Final    Eosinophils % 01/18/2023 5.4 (H)  1.0 - 4.0 % Final    Basophils % 01/18/2023 0.7  0.0 - 1.0 % Final    Immature Granulocytes % 01/18/2023 0.4  0.0 - 0.4 % Final    nRBC, Auto 01/18/2023 0.0  <=0.0 % Final    Neutrophils, Abs 01/18/2023 4.94  1.80 - 7.70 K/uL Final    Lymphocytes, Absolute 01/18/2023 2.17  1.00 - 4.80 K/uL Final    Monocytes, Absolute 01/18/2023 0.58  0.00 - 0.80 K/uL Final    Eosinophils, Absolute 01/18/2023 0.44  0.00 - 0.50 K/uL Final    Basophils, Absolute 01/18/2023 0.06  0.00 - 0.20 K/uL Final    Immature Granulocytes, Absolute 01/18/2023 0.03  0.00 - 0.04 K/uL Final    nRBC, Absolute 01/18/2023 0.00  <=0.00 x10e3/uL Final    Diff Type 01/18/2023 Auto   Final   Office Visit on 11/22/2022   Component Date Value Ref Range  Status    POC Amphetamines 11/22/2022 Negative  Negative, Inconclusive Final    POC Barbiturates 11/22/2022 Negative  Negative, Inconclusive Final    POC Benzodiazepines 11/22/2022 Negative  Negative, Inconclusive Final    POC Cocaine 11/22/2022 Negative  Negative, Inconclusive Final    POC THC 11/22/2022 Negative  Negative, Inconclusive Final    POC Methadone 11/22/2022 Negative  Negative, Inconclusive Final    POC Methamphetamine 11/22/2022 Negative  Negative, Inconclusive Final    POC Opiates 11/22/2022 Presumptive Positive (A)  Negative, Inconclusive Final    POC Oxycodone 11/22/2022 Negative  Negative, Inconclusive Final    POC Phencyclidine 11/22/2022 Negative  Negative, Inconclusive Final    POC Methylenedioxymethamphetamine * 11/22/2022 Negative  Negative, Inconclusive Final    POC Tricyclic Antidepressants 11/22/2022 Negative  Negative, Inconclusive Final    POC Buprenorphine 11/22/2022 Negative   Final     Acceptable 11/22/2022 Yes   Final    POC Temperature (Urine) 11/22/2022 92   Final   Admission on 11/08/2022, Discharged on 11/08/2022   Component Date Value Ref Range Status    POC Glucose 11/08/2022 108 (H)  70 - 105 mg/dL Final         Orders Placed This Encounter   Procedures    CT Cervical Spine Without Contrast     Standing Status:   Future     Standing Expiration Date:   4/3/2024     Order Specific Question:   May the Radiologist modify the order per protocol to meet the clinical needs of the patient?     Answer:   Yes         Requested Prescriptions     Signed Prescriptions Disp Refills    HYDROcodone-acetaminophen (NORCO) 7.5-325 mg per tablet 90 tablet 0     Sig: Take 1 tablet by mouth every 8 (eight) hours as needed for Pain.       Assessment:     1. 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 Osteoarthrosis multiple sites, not specified as generalized    3. Chronic pain of right knee    4. Lumbar radiculopathy    5. Thoracic radiculopathy    6. Pain in thoracic spine     7. Cervicalgia           A's of Opioid Risk Assessment  Activity:Patient can perform ADL.   Analgesia:Patients pain is partially controlled by current medication. Patient has tried OTC medications such as Tylenol and Ibuprofen with out relief.   Adverse Effects: Patient denies constipation or sedation.  Aberrant Behavior:  reviewed with no aberrant drug seeking/taking behavior.  Overdose reversal drug naloxone discussed    Drug screen reviewed    X-ray bilateral knee [facility] September 4, 2020 degenerative changes noted no fracture noted    CT thoracic spine degenerative changes throughout the thoracic spine radiologist did find a noncalcified 5-6 mm lesion left lower lung area recommended follow-up with CT chest 6 months   Primary care provider following this      Plan:    Narcan November 2022    Cardiac defibrillator cannot have MRI    History lumbar surgery 2005, Dr. Woods [facility]    Complaint of cervical spine discomfort worse with flexion extension rotation cervical spine requesting further evaluation     CT cervical spine no contrast    Continue home exercise program as directed    Follow-up after CT cervical spine discuss options for pain    Dr. Farah November 2023    Bring original prescription medication bottles/container/box with labels to each visit    Pill count    Physical therapy    Massage therapy declines

## 2023-03-31 ENCOUNTER — TELEPHONE (OUTPATIENT)
Dept: GASTROENTEROLOGY | Facility: CLINIC | Age: 83
End: 2023-03-31
Payer: MEDICARE

## 2023-03-31 DIAGNOSIS — R13.10 DYSPHAGIA, UNSPECIFIED TYPE: Primary | ICD-10-CM

## 2023-04-03 ENCOUNTER — OFFICE VISIT (OUTPATIENT)
Dept: PAIN MEDICINE | Facility: CLINIC | Age: 83
End: 2023-04-03
Payer: MEDICARE

## 2023-04-03 VITALS
HEART RATE: 80 BPM | DIASTOLIC BLOOD PRESSURE: 55 MMHG | WEIGHT: 140 LBS | SYSTOLIC BLOOD PRESSURE: 124 MMHG | BODY MASS INDEX: 24.8 KG/M2 | HEIGHT: 63 IN | RESPIRATION RATE: 20 BRPM

## 2023-04-03 DIAGNOSIS — M54.6 PAIN IN THORACIC SPINE: ICD-10-CM

## 2023-04-03 DIAGNOSIS — M89.49 OSTEOARTHROSIS MULTIPLE SITES, NOT SPECIFIED AS GENERALIZED: Chronic | ICD-10-CM

## 2023-04-03 DIAGNOSIS — G89.29 CHRONIC PAIN OF RIGHT KNEE: ICD-10-CM

## 2023-04-03 DIAGNOSIS — M54.2 CERVICALGIA: ICD-10-CM

## 2023-04-03 DIAGNOSIS — M54.14 THORACIC RADICULOPATHY: Chronic | ICD-10-CM

## 2023-04-03 DIAGNOSIS — M54.16 LUMBAR RADICULOPATHY: Chronic | ICD-10-CM

## 2023-04-03 DIAGNOSIS — M25.561 CHRONIC PAIN OF RIGHT KNEE: ICD-10-CM

## 2023-04-03 DIAGNOSIS — M47.812 SPONDYLOSIS OF CERVICAL JOINT WITHOUT MYELOPATHY: Primary | Chronic | ICD-10-CM

## 2023-04-03 PROCEDURE — 99213 OFFICE O/P EST LOW 20 MIN: CPT | Mod: PBBFAC | Performed by: PHYSICIAN ASSISTANT

## 2023-04-03 PROCEDURE — 1125F PR PAIN SEVERITY QUANTIFIED, PAIN PRESENT: ICD-10-PCS | Mod: CPTII,,, | Performed by: PHYSICIAN ASSISTANT

## 2023-04-03 PROCEDURE — 1125F AMNT PAIN NOTED PAIN PRSNT: CPT | Mod: CPTII,,, | Performed by: PHYSICIAN ASSISTANT

## 2023-04-03 PROCEDURE — 99214 OFFICE O/P EST MOD 30 MIN: CPT | Mod: S$PBB,,, | Performed by: PHYSICIAN ASSISTANT

## 2023-04-03 PROCEDURE — 3074F SYST BP LT 130 MM HG: CPT | Mod: CPTII,,, | Performed by: PHYSICIAN ASSISTANT

## 2023-04-03 PROCEDURE — 1101F PR PT FALLS ASSESS DOC 0-1 FALLS W/OUT INJ PAST YR: ICD-10-PCS | Mod: CPTII,,, | Performed by: PHYSICIAN ASSISTANT

## 2023-04-03 PROCEDURE — 3074F PR MOST RECENT SYSTOLIC BLOOD PRESSURE < 130 MM HG: ICD-10-PCS | Mod: CPTII,,, | Performed by: PHYSICIAN ASSISTANT

## 2023-04-03 PROCEDURE — 3078F PR MOST RECENT DIASTOLIC BLOOD PRESSURE < 80 MM HG: ICD-10-PCS | Mod: CPTII,,, | Performed by: PHYSICIAN ASSISTANT

## 2023-04-03 PROCEDURE — 1101F PT FALLS ASSESS-DOCD LE1/YR: CPT | Mod: CPTII,,, | Performed by: PHYSICIAN ASSISTANT

## 2023-04-03 PROCEDURE — 3288F PR FALLS RISK ASSESSMENT DOCUMENTED: ICD-10-PCS | Mod: CPTII,,, | Performed by: PHYSICIAN ASSISTANT

## 2023-04-03 PROCEDURE — 3288F FALL RISK ASSESSMENT DOCD: CPT | Mod: CPTII,,, | Performed by: PHYSICIAN ASSISTANT

## 2023-04-03 PROCEDURE — 3078F DIAST BP <80 MM HG: CPT | Mod: CPTII,,, | Performed by: PHYSICIAN ASSISTANT

## 2023-04-03 PROCEDURE — 99214 PR OFFICE/OUTPT VISIT, EST, LEVL IV, 30-39 MIN: ICD-10-PCS | Mod: S$PBB,,, | Performed by: PHYSICIAN ASSISTANT

## 2023-04-03 RX ORDER — HYDROCODONE BITARTRATE AND ACETAMINOPHEN 7.5; 325 MG/1; MG/1
1 TABLET ORAL EVERY 8 HOURS PRN
Qty: 90 TABLET | Refills: 0 | Status: SHIPPED | OUTPATIENT
Start: 2023-04-03 | End: 2023-05-24 | Stop reason: SDUPTHER

## 2023-04-17 ENCOUNTER — HOSPITAL ENCOUNTER (OUTPATIENT)
Dept: GASTROENTEROLOGY | Facility: HOSPITAL | Age: 83
Discharge: HOME | End: 2023-04-17
Attending: INTERNAL MEDICINE
Payer: MEDICARE

## 2023-04-17 ENCOUNTER — ANESTHESIA EVENT (OUTPATIENT)
Dept: GASTROENTEROLOGY | Facility: HOSPITAL | Age: 83
End: 2023-04-17
Payer: MEDICARE

## 2023-04-17 ENCOUNTER — ANESTHESIA (OUTPATIENT)
Dept: GASTROENTEROLOGY | Facility: HOSPITAL | Age: 83
End: 2023-04-17
Payer: MEDICARE

## 2023-04-17 ENCOUNTER — HOSPITAL ENCOUNTER (OUTPATIENT)
Dept: RADIOLOGY | Facility: HOSPITAL | Age: 83
Discharge: HOME | End: 2023-04-17
Attending: PHYSICIAN ASSISTANT
Payer: MEDICARE

## 2023-04-17 VITALS
OXYGEN SATURATION: 97 % | TEMPERATURE: 98 F | HEART RATE: 59 BPM | RESPIRATION RATE: 19 BRPM | SYSTOLIC BLOOD PRESSURE: 107 MMHG | DIASTOLIC BLOOD PRESSURE: 51 MMHG

## 2023-04-17 DIAGNOSIS — K29.00 ACUTE SUPERFICIAL GASTRITIS WITHOUT HEMORRHAGE: ICD-10-CM

## 2023-04-17 DIAGNOSIS — R13.10 DYSPHAGIA, UNSPECIFIED TYPE: ICD-10-CM

## 2023-04-17 DIAGNOSIS — M54.2 CERVICALGIA: ICD-10-CM

## 2023-04-17 DIAGNOSIS — R13.19 ESOPHAGEAL DYSPHAGIA: Primary | ICD-10-CM

## 2023-04-17 PROCEDURE — D9220A PRA ANESTHESIA: Mod: ,,, | Performed by: NURSE ANESTHETIST, CERTIFIED REGISTERED

## 2023-04-17 PROCEDURE — 37000009 HC ANESTHESIA EA ADD 15 MINS

## 2023-04-17 PROCEDURE — D9220A PRA ANESTHESIA: ICD-10-PCS | Mod: ,,, | Performed by: NURSE ANESTHETIST, CERTIFIED REGISTERED

## 2023-04-17 PROCEDURE — 27000284 HC CANNULA NASAL: Performed by: NURSE ANESTHETIST, CERTIFIED REGISTERED

## 2023-04-17 PROCEDURE — 25000003 PHARM REV CODE 250: Performed by: NURSE ANESTHETIST, CERTIFIED REGISTERED

## 2023-04-17 PROCEDURE — 72125 CT NECK SPINE W/O DYE: CPT | Mod: TC

## 2023-04-17 PROCEDURE — 88342 SURGICAL PATHOLOGY: ICD-10-PCS | Mod: 26,,, | Performed by: PATHOLOGY

## 2023-04-17 PROCEDURE — 43239 EGD BIOPSY SINGLE/MULTIPLE: CPT | Mod: ,,, | Performed by: INTERNAL MEDICINE

## 2023-04-17 PROCEDURE — 88305 TISSUE EXAM BY PATHOLOGIST: CPT | Mod: TC,SUR | Performed by: INTERNAL MEDICINE

## 2023-04-17 PROCEDURE — 88305 TISSUE EXAM BY PATHOLOGIST: CPT | Mod: 26,,, | Performed by: PATHOLOGY

## 2023-04-17 PROCEDURE — 27201423 OPTIME MED/SURG SUP & DEVICES STERILE SUPPLY

## 2023-04-17 PROCEDURE — 43239 PR EGD, FLEX, W/BIOPSY, SGL/MULTI: ICD-10-PCS | Mod: ,,, | Performed by: INTERNAL MEDICINE

## 2023-04-17 PROCEDURE — 88342 IMHCHEM/IMCYTCHM 1ST ANTB: CPT | Mod: 26,,, | Performed by: PATHOLOGY

## 2023-04-17 PROCEDURE — 63600175 PHARM REV CODE 636 W HCPCS: Performed by: NURSE ANESTHETIST, CERTIFIED REGISTERED

## 2023-04-17 PROCEDURE — 37000008 HC ANESTHESIA 1ST 15 MINUTES

## 2023-04-17 PROCEDURE — 88305 SURGICAL PATHOLOGY: ICD-10-PCS | Mod: 26,,, | Performed by: PATHOLOGY

## 2023-04-17 PROCEDURE — 72125 CT NECK SPINE W/O DYE: CPT | Mod: 26,,, | Performed by: RADIOLOGY

## 2023-04-17 PROCEDURE — 43239 EGD BIOPSY SINGLE/MULTIPLE: CPT | Performed by: INTERNAL MEDICINE

## 2023-04-17 PROCEDURE — 72125 CT CERVICAL SPINE WITHOUT CONTRAST: ICD-10-PCS | Mod: 26,,, | Performed by: RADIOLOGY

## 2023-04-17 RX ORDER — LIDOCAINE HYDROCHLORIDE 20 MG/ML
INJECTION, SOLUTION EPIDURAL; INFILTRATION; INTRACAUDAL; PERINEURAL
Status: DISCONTINUED | OUTPATIENT
Start: 2023-04-17 | End: 2023-04-17

## 2023-04-17 RX ORDER — PROPOFOL 10 MG/ML
VIAL (ML) INTRAVENOUS
Status: DISCONTINUED | OUTPATIENT
Start: 2023-04-17 | End: 2023-04-17

## 2023-04-17 RX ORDER — SODIUM CHLORIDE 0.9 % (FLUSH) 0.9 %
10 SYRINGE (ML) INJECTION
Status: CANCELLED | OUTPATIENT
Start: 2023-04-17

## 2023-04-17 RX ADMIN — SODIUM CHLORIDE: 9 INJECTION, SOLUTION INTRAVENOUS at 10:04

## 2023-04-17 RX ADMIN — PROPOFOL 80 MG: 10 INJECTION, EMULSION INTRAVENOUS at 10:04

## 2023-04-17 RX ADMIN — LIDOCAINE HYDROCHLORIDE 40 MG: 20 INJECTION, SOLUTION INTRAVENOUS at 10:04

## 2023-04-17 RX ADMIN — PROPOFOL 30 MG: 10 INJECTION, EMULSION INTRAVENOUS at 10:04

## 2023-04-17 NOTE — ANESTHESIA POSTPROCEDURE EVALUATION
Anesthesia Post Evaluation    Patient: Sharee Elizondo    Procedure(s) Performed: EGD with Dilation    Final Anesthesia Type: general      Patient location during evaluation: GI PACU  Patient participation: Yes- Able to Participate  Level of consciousness: awake and alert  Post-procedure vital signs: reviewed and stable  Pain management: adequate  Airway patency: patent  KEYUR mitigation strategies: Multimodal analgesia  PONV status at discharge: No PONV  Anesthetic complications: no      Cardiovascular status: stable  Respiratory status: unassisted  Hydration status: euvolemic  Follow-up not needed.          Vitals Value Taken Time   /47 04/17/23 1038   Temp 36.6 °C (97.9 °F) 04/17/23 1024   Pulse 71 04/17/23 1038   Resp 17 04/17/23 1037   SpO2 97 % 04/17/23 1037   Vitals shown include unvalidated device data.      No case tracking events are documented in the log.      Pain/Christiano Score: Christiano Score: 9 (4/17/2023 10:28 AM)

## 2023-04-17 NOTE — ANESTHESIA PREPROCEDURE EVALUATION
04/17/2023  Sharee Elizondo is a 83 y.o., female.    Past Medical History:   Diagnosis Date    Acute superficial gastritis without hemorrhage 2/18/2022    Atrial fibrillation     Automatic implantable cardiac defibrillator in situ     Bilateral primary osteoarthritis of knee     Cervical spondylosis without myelopathy     CHF (congestive heart failure)     Chronic ischemic heart disease     Chronic kidney disease (CKD), stage III (moderate)     Chronic pain syndrome     COPD (chronic obstructive pulmonary disease)     Coronary arteriosclerosis     Depressive disorder     Esophageal dysphagia 2/18/2022    GERD (gastroesophageal reflux disease)     HH (hiatus hernia) 2/18/2022    Hypothyroidism     Low back pain     Lumbar radiculopathy     Melanoma 2019    scalp    Metabolic syndrome     Myocardial ischemia     Other long term (current) drug therapy     Radiculopathy, cervical region     Type 2 diabetes mellitus        Past Surgical History:   Procedure Laterality Date    BACK SURGERY      bilateral knee injection/ aspiration of joint/ bursa-large  Bilateral 4/4/2017 3/27/2017 3/20/2017    bilateral knee orthovisc injection    CARDIAC SURGERY      EPIDURAL STEROID INJECTION INTO CERVICAL SPINE  6/19/2019 5/17/2017 4/26/2017    C6-7 LONNIEDr Farah    EPIDURAL STEROID INJECTION INTO LUMBAR SPINE  03/15/2018    L4-5 LONNIEDr Farah    HYSTERECTOMY      INJECTION OF ANESTHETIC AGENT AROUND MEDIAL BRANCH NERVES INNERVATING LUMBAR FACET JOINT Bilateral 4/14/2022    Procedure: Block-nerve-medial branch-lumbar, bilateral L4 through S1;  Surgeon: Angie Farah MD;  Location: [facility];  Service: Pain Management;  Laterality: Bilateral;  pt aware at visit to be tested  4/12 patient getting covid test today    INJECTION OF FACET JOINT Bilateral 8/17/2017 7/19/2017    Bilateral C3-7  FI, Dr Farah    INJECTION OF FACET JOINT Left 10/18/2018    left C3-7 FI, Dr Farah    INJECTION OF FACET JOINT Bilateral 8/22/2018 6/29/2018    bilateral L3-S1 FI, Dr Farah    RADIOFREQUENCY ABLATION Right 12/12/2018    Right C3-7 RF, Dr Farah    RADIOFREQUENCY ABLATION Left 01/9/2019 10/16/2017 10/7/2020    Left C3-7 RF, Dr Farah    RADIOFREQUENCY ABLATION Left 1/9/2019 10/16/2017    left C3-7 RF, Dr Farah    RADIOFREQUENCY THERMAL COAGULATION OF MEDIAL BRANCH OF POSTERIOR RAMUS OF CERVICAL SPINAL NERVE Left 8/5/2021    Procedure: RADIOFREQUENCY THERMAL COAGULATION, NERVE, SPINAL, CERVICAL, POSTERIOR RAMUS, MEDIAL BRANCH, Left C3-4,4-5  only 2 levels resubmitted dt denied all levels on 8-3-2021;  Surgeon: Angie Farah MD;  Location: [facility];  Service: Pain Management;  Laterality: Left;    RADIOFREQUENCY THERMAL COAGULATION OF MEDIAL BRANCH OF POSTERIOR RAMUS OF CERVICAL SPINAL NERVE Right 8/24/2021    Procedure: RADIOFREQUENCY THERMAL COAGULATION, NERVE, SPINAL, CERVICAL, POSTERIOR RAMUS, MEDIAL BRANCH RIGHT C3-C5 RFTC;  Surgeon: Angie Farah MD;  Location: [facility];  Service: Pain Management;  Laterality: Right;  HAD VAC WILL BRING CARD    TRANSFORAMINAL EPIDURAL INJECTION OF STEROID Right 5/30/2018 5/2/2018    Right L4-5 TFESI, Dr Farah       Family History   Problem Relation Age of Onset    Hypertension Mother     Diabetes Mellitus Mother        Social History     Socioeconomic History    Marital status:    Tobacco Use    Smoking status: Every Day     Packs/day: 0.50     Years: 40.00     Pack years: 20.00     Types: Cigarettes     Passive exposure: Current    Smokeless tobacco: Never   Substance and Sexual Activity    Alcohol use: Not Currently    Drug use: Never       Current Outpatient Medications   Medication Sig Dispense Refill    amitriptyline (ELAVIL) 25 MG tablet Take 1 tablet (25 mg total) by mouth nightly as needed for Insomnia. 90 tablet 1     ascorbic acid, vitamin C, (VITAMIN C) 500 MG tablet Take 500 mg by mouth once daily.      blood sugar diagnostic (TRUE METRIX GLUCOSE TEST STRIP) Strp TEST BLOOD SUGAR TWICE DAILY FOR DIABETES 200 strip 5    blood-glucose meter Misc Use to check blood glucose twice daily for diabetes, E11.9 1 each 0    carvediloL (COREG) 3.125 MG tablet TAKE 1 TABLET TWICE DAILY WITH MEALS 180 tablet 1    cholecalciferol, vitamin D3, (VITAMIN D3) 25 mcg (1,000 unit) capsule Take 2,000 Units by mouth 2 (two) times daily.      digoxin (LANOXIN) 125 mcg tablet Take 125 mcg by mouth once daily.      dulaglutide (TRULICITY) 1.5 mg/0.5 mL pen injector INJECT 1.5 MG INTO THE SKIN EVERY 7 DAYS. 12 pen 1    esomeprazole (NEXIUM) 40 MG capsule Take 1 capsule (40 mg total) by mouth once daily. 90 capsule 1    fluticasone propionate (FLONASE) 50 mcg/actuation nasal spray 2 sprays (100 mcg total) by Each Nostril route once daily. 48 g 2    furosemide (LASIX) 40 MG tablet Take 1 tablet (40 mg total) by mouth once daily. 90 tablet 1    gabapentin (NEURONTIN) 100 MG capsule Take 1 capsule (100 mg total) by mouth every 8 (eight) hours. 270 capsule 2    HYDROcodone-acetaminophen (NORCO) 7.5-325 mg per tablet Take 1 tablet by mouth every 8 (eight) hours as needed for Pain. 90 tablet 0    [START ON 4/26/2023] HYDROcodone-acetaminophen (NORCO) 7.5-325 mg per tablet Take 1 tablet by mouth every 8 (eight) hours as needed for Pain. 90 tablet 0    HYDROcodone-acetaminophen (NORCO) 7.5-325 mg per tablet Take 1 tablet by mouth every 8 (eight) hours as needed for Pain. 90 tablet 0    icosapent ethyL (VASCEPA) 0.5 gram Cap Take 1 capsule by mouth 2 (two) times a day. 180 capsule 1    insulin degludec (TRESIBA FLEXTOUCH U-100) 100 unit/mL (3 mL) insulin pen INJECT 10 UNITS SUBCUTANEOUSLY EVERY DAY 3 pen 2    lancets (TRUEPLUS LANCETS) 33 gauge Misc CHECK BLOOD SUGAR TWICE DAILY FOR DIABETES 100 each 5    levothyroxine (SYNTHROID) 75 MCG tablet  "Take 1 tablet (75 mcg total) by mouth once daily. 90 tablet 1    lisinopriL (PRINIVIL,ZESTRIL) 5 MG tablet Take 1 tablet (5 mg total) by mouth once daily. 90 tablet 1    methocarbamoL (ROBAXIN) 500 MG Tab Take 1 tablet (500 mg total) by mouth 2 (two) times daily. 180 tablet 1    montelukast (SINGULAIR) 10 mg tablet Take 1 tablet (10 mg total) by mouth every evening. 90 tablet 1    nitroGLYCERIN (NITROSTAT) 0.4 MG SL tablet Place 1 tablet (0.4 mg total) under the tongue every 5 (five) minutes as needed for Chest pain. 30 tablet 2    pen needle, diabetic 32 gauge x 1/4" Ndle Use daily to check blood glucose 100 each 2    rosuvastatin (CRESTOR) 40 MG Tab Take 1 tablet (40 mg total) by mouth every evening. 90 tablet 1    sertraline (ZOLOFT) 50 MG tablet Take 1 tablet (50 mg total) by mouth once daily. 90 tablet 1    spironolactone (ALDACTONE) 25 MG tablet Take 1 tablet (25 mg total) by mouth once daily. 90 tablet 1     No current facility-administered medications for this encounter.       Review of patient's allergies indicates:  No Known Allergies    Pre-op Assessment    I have reviewed the Patient Summary Reports.     I have reviewed the Nursing Notes. I have reviewed the NPO Status.   I have reviewed the Medications.     Review of Systems  Anesthesia Hx:  No problems with previous Anesthesia    Cardiovascular:   CAD   CHF    Pulmonary:   COPD    Renal/:   Chronic Renal Disease, CKD    Hepatic/GI:   Hiatal Hernia, GERD    Musculoskeletal:   Arthritis     Neurological:   Neuromuscular Disease,    Endocrine:   Diabetes Hypothyroidism    Psych:   Psychiatric History          Physical Exam    Airway:  Mallampati: II   Mouth Opening: Normal  TM Distance: Normal  Tongue: Normal        Anesthesia Plan  Type of Anesthesia, risks & benefits discussed:    Anesthesia Type: Gen Natural Airway  Intra-op Monitoring Plan: Standard ASA Monitors  Post Op Pain Control Plan: multimodal analgesia  Induction:  IV  Informed " Consent: Informed consent signed with the Patient and all parties understand the risks and agree with anesthesia plan.  All questions answered. Patient consented to blood products? Yes  ASA Score: 4  Day of Surgery Review of History & Physical: H&P Update referred to the surgeon/provider.I have interviewed and examined the patient. I have reviewed the patient's H&P dated:     Ready For Surgery From Anesthesia Perspective.     .

## 2023-04-17 NOTE — H&P
[facility]  Gastroenterology  H&P    Patient Name: Sharee Elizondo  MRN: [id number]  Admission Date: 4/17/2023  Code Status: No Order    Attending Provider: Michael Long MD   Primary Care Physician: JOSELITO Mora  Principal Problem:<principal problem not specified>    Subjective:     History of Present Illness: Pt c/o esophageal dysphagia and responds to esophageal dilation; for egd with dilation today.    Past Medical History:   Diagnosis Date    Acute superficial gastritis without hemorrhage 2/18/2022    Atrial fibrillation     Automatic implantable cardiac defibrillator in situ     Bilateral primary osteoarthritis of knee     Cervical spondylosis without myelopathy     CHF (congestive heart failure)     Chronic ischemic heart disease     Chronic kidney disease (CKD), stage III (moderate)     Chronic pain syndrome     COPD (chronic obstructive pulmonary disease)     Coronary arteriosclerosis     Depressive disorder     Esophageal dysphagia 2/18/2022    GERD (gastroesophageal reflux disease)     HH (hiatus hernia) 2/18/2022    Hypothyroidism     Low back pain     Lumbar radiculopathy     Melanoma 2019    scalp    Metabolic syndrome     Myocardial ischemia     Other long term (current) drug therapy     Radiculopathy, cervical region     Type 2 diabetes mellitus        Past Surgical History:   Procedure Laterality Date    BACK SURGERY      bilateral knee injection/ aspiration of joint/ bursa-large  Bilateral 4/4/2017 3/27/2017 3/20/2017    bilateral knee orthovisc injection    CARDIAC SURGERY      EPIDURAL STEROID INJECTION INTO CERVICAL SPINE  6/19/2019 5/17/2017 4/26/2017    C6-7 Dr Gagan FLEMING    EPIDURAL STEROID INJECTION INTO LUMBAR SPINE  03/15/2018    L4-5 LONNIEDr Farah    HYSTERECTOMY      INJECTION OF ANESTHETIC AGENT AROUND MEDIAL BRANCH NERVES INNERVATING LUMBAR FACET JOINT Bilateral 4/14/2022    Procedure: Block-nerve-medial branch-lumbar, bilateral L4 through S1;  Surgeon: Angie CHRISTIANSON  MD Gagan;  Location: [facility];  Service: Pain Management;  Laterality: Bilateral;  pt aware at visit to be tested  4/12 patient getting covid test today    INJECTION OF FACET JOINT Bilateral 8/17/2017 7/19/2017    Bilateral C3-7 FI, Dr Farah    INJECTION OF FACET JOINT Left 10/18/2018    left C3-7 FI, Dr Farah    INJECTION OF FACET JOINT Bilateral 8/22/2018 6/29/2018    bilateral L3-S1 FI, Dr Farah    RADIOFREQUENCY ABLATION Right 12/12/2018    Right C3-7 RF, Dr Farah    RADIOFREQUENCY ABLATION Left 01/9/2019 10/16/2017 10/7/2020    Left C3-7 RF, Dr Farah    RADIOFREQUENCY ABLATION Left 1/9/2019 10/16/2017    left C3-7 RF, Dr Farah    RADIOFREQUENCY THERMAL COAGULATION OF MEDIAL BRANCH OF POSTERIOR RAMUS OF CERVICAL SPINAL NERVE Left 8/5/2021    Procedure: RADIOFREQUENCY THERMAL COAGULATION, NERVE, SPINAL, CERVICAL, POSTERIOR RAMUS, MEDIAL BRANCH, Left C3-4,4-5  only 2 levels resubmitted dt denied all levels on 8-3-2021;  Surgeon: Angie Farah MD;  Location: [facility];  Service: Pain Management;  Laterality: Left;    RADIOFREQUENCY THERMAL COAGULATION OF MEDIAL BRANCH OF POSTERIOR RAMUS OF CERVICAL SPINAL NERVE Right 8/24/2021    Procedure: RADIOFREQUENCY THERMAL COAGULATION, NERVE, SPINAL, CERVICAL, POSTERIOR RAMUS, MEDIAL BRANCH RIGHT C3-C5 RFTC;  Surgeon: Angie Farah MD;  Location: [facility];  Service: Pain Management;  Laterality: Right;  HAD VAC WILL BRING CARD    TRANSFORAMINAL EPIDURAL INJECTION OF STEROID Right 5/30/2018 5/2/2018    Right L4-5 TFESI, Dr Farah       Review of patient's allergies indicates:  No Known Allergies  Family History       Problem Relation (Age of Onset)    Diabetes Mellitus Mother    Hypertension Mother          Tobacco Use    Smoking status: Every Day     Packs/day: 0.50     Years: 40.00     Pack years: 20.00     Types: Cigarettes     Passive exposure: Current    Smokeless tobacco: Never   Substance and Sexual Activity    Alcohol use:  Not Currently    Drug use: Never    Sexual activity: Not on file     Review of Systems   Respiratory: Negative.     Cardiovascular: Negative.    Gastrointestinal: Negative.    Objective:     Vital Signs (Most Recent):  Pulse: 66 (04/17/23 0929)  Resp: 16 (04/17/23 0929)  BP: (!) 99/45 (04/17/23 0929)  SpO2: 97 % (04/17/23 0929)   Vital Signs (24h Range):  Pulse:  [66] 66  Resp:  [16] 16  SpO2:  [97 %] 97 %  BP: (99)/(45) 99/45        There is no height or weight on file to calculate BMI.    No intake or output data in the 24 hours ending 04/17/23 1001    Lines/Drains/Airways       Peripheral Intravenous Line  Duration                  Peripheral IV - Single Lumen 04/17/23 0929 22 G Right Antecubital <1 day                    Physical Exam  Vitals reviewed.   Constitutional:       General: She is not in acute distress.     Appearance: Normal appearance. She is well-developed. She is not ill-appearing.   HENT:      Head: Normocephalic and atraumatic.      Nose: Nose normal.   Eyes:      Pupils: Pupils are equal, round, and reactive to light.   Cardiovascular:      Rate and Rhythm: Normal rate and regular rhythm.   Pulmonary:      Effort: Pulmonary effort is normal.      Breath sounds: Normal breath sounds. No wheezing.   Abdominal:      General: Abdomen is flat. Bowel sounds are normal. There is no distension.      Palpations: Abdomen is soft.      Tenderness: There is no abdominal tenderness. There is no guarding.   Skin:     General: Skin is warm and dry.      Coloration: Skin is not jaundiced.   Neurological:      Mental Status: She is alert.   Psychiatric:         Attention and Perception: Attention normal.         Mood and Affect: Affect normal.         Speech: Speech normal.         Behavior: Behavior is cooperative.      Comments: Pt was calm while speaking.       Significant Labs:  CBC: No results for input(s): WBC, HGB, HCT, PLT in the last 48 hours.  CMP: No results for input(s): GLU, CALCIUM, ALBUMIN, PROT,  NA, K, CO2, CL, BUN, CREATININE, ALKPHOS, ALT, AST, BILITOT in the last 48 hours.    Significant Imaging:  Imaging results within the past 24 hours have been reviewed.    Assessment/Plan:     There are no hospital problems to display for this patient.        Imp: esophageal dysphagia  Plan: egd with dilation    Michael Long MD  Gastroenterology  [facility]

## 2023-04-17 NOTE — TRANSFER OF CARE
Anesthesia Transfer of Care Note    Patient: Sharee Elizondo    Procedure(s) Performed: EGD with Dilation    Patient location: GI    Anesthesia Type: general    Transport from OR: Transported from OR on room air with adequate spontaneous ventilation    Post pain: adequate analgesia    Post assessment: no apparent anesthetic complications and tolerated procedure well    Post vital signs: stable    Level of consciousness: responds to stimulation    Nausea/Vomiting: no nausea/vomiting    Complications: none    Transfer of care protocol was followed      Last vitals:   Visit Vitals  /67 (BP Location: Left arm, Patient Position: Lying)   Pulse 76   Temp 36.6 °C (97.9 °F) (Skin)   Resp 12   SpO2 100%   Breastfeeding No

## 2023-04-25 ENCOUNTER — TELEPHONE (OUTPATIENT)
Dept: GASTROENTEROLOGY | Facility: CLINIC | Age: 83
End: 2023-04-25
Payer: MEDICARE

## 2023-04-25 NOTE — TELEPHONE ENCOUNTER
Called patient to discuss results and verbalized understanding.        ----- Message from Michael Long MD sent at 4/18/2023  3:52 PM CDT -----  EGD biopsy shows normal esophagus and gastritis without H.pylori.

## 2023-05-01 ENCOUNTER — OFFICE VISIT (OUTPATIENT)
Dept: FAMILY MEDICINE | Facility: CLINIC | Age: 83
End: 2023-05-01
Payer: MEDICARE

## 2023-05-01 DIAGNOSIS — R25.1 OCCASIONAL TREMORS: ICD-10-CM

## 2023-05-01 DIAGNOSIS — E11.69 TYPE 2 DIABETES MELLITUS WITH OTHER SPECIFIED COMPLICATION, WITHOUT LONG-TERM CURRENT USE OF INSULIN: Primary | ICD-10-CM

## 2023-05-01 DIAGNOSIS — R29.5 TRANSIENT PARALYSIS: ICD-10-CM

## 2023-05-01 DIAGNOSIS — E11.9 DIABETES MELLITUS WITHOUT COMPLICATION: ICD-10-CM

## 2023-05-01 PROCEDURE — 3288F FALL RISK ASSESSMENT DOCD: CPT | Mod: ,,, | Performed by: FAMILY MEDICINE

## 2023-05-01 PROCEDURE — 1160F RVW MEDS BY RX/DR IN RCRD: CPT | Mod: ,,, | Performed by: FAMILY MEDICINE

## 2023-05-01 PROCEDURE — 99212 PR OFFICE/OUTPT VISIT, EST, LEVL II, 10-19 MIN: ICD-10-PCS | Mod: ,,, | Performed by: FAMILY MEDICINE

## 2023-05-01 PROCEDURE — 3074F SYST BP LT 130 MM HG: CPT | Mod: ,,, | Performed by: FAMILY MEDICINE

## 2023-05-01 PROCEDURE — 1101F PT FALLS ASSESS-DOCD LE1/YR: CPT | Mod: ,,, | Performed by: FAMILY MEDICINE

## 2023-05-01 PROCEDURE — 3078F DIAST BP <80 MM HG: CPT | Mod: ,,, | Performed by: FAMILY MEDICINE

## 2023-05-01 PROCEDURE — 3078F PR MOST RECENT DIASTOLIC BLOOD PRESSURE < 80 MM HG: ICD-10-PCS | Mod: ,,, | Performed by: FAMILY MEDICINE

## 2023-05-01 PROCEDURE — 1159F PR MEDICATION LIST DOCUMENTED IN MEDICAL RECORD: ICD-10-PCS | Mod: ,,, | Performed by: FAMILY MEDICINE

## 2023-05-01 PROCEDURE — 99212 OFFICE O/P EST SF 10 MIN: CPT | Mod: ,,, | Performed by: FAMILY MEDICINE

## 2023-05-01 PROCEDURE — 3074F PR MOST RECENT SYSTOLIC BLOOD PRESSURE < 130 MM HG: ICD-10-PCS | Mod: ,,, | Performed by: FAMILY MEDICINE

## 2023-05-01 PROCEDURE — 1101F PR PT FALLS ASSESS DOC 0-1 FALLS W/OUT INJ PAST YR: ICD-10-PCS | Mod: ,,, | Performed by: FAMILY MEDICINE

## 2023-05-01 PROCEDURE — 3288F PR FALLS RISK ASSESSMENT DOCUMENTED: ICD-10-PCS | Mod: ,,, | Performed by: FAMILY MEDICINE

## 2023-05-01 PROCEDURE — 1125F PR PAIN SEVERITY QUANTIFIED, PAIN PRESENT: ICD-10-PCS | Mod: ,,, | Performed by: FAMILY MEDICINE

## 2023-05-01 PROCEDURE — 1125F AMNT PAIN NOTED PAIN PRSNT: CPT | Mod: ,,, | Performed by: FAMILY MEDICINE

## 2023-05-01 PROCEDURE — 1160F PR REVIEW ALL MEDS BY PRESCRIBER/CLIN PHARMACIST DOCUMENTED: ICD-10-PCS | Mod: ,,, | Performed by: FAMILY MEDICINE

## 2023-05-01 PROCEDURE — 1159F MED LIST DOCD IN RCRD: CPT | Mod: ,,, | Performed by: FAMILY MEDICINE

## 2023-05-02 VITALS
HEART RATE: 80 BPM | DIASTOLIC BLOOD PRESSURE: 54 MMHG | WEIGHT: 136 LBS | SYSTOLIC BLOOD PRESSURE: 100 MMHG | RESPIRATION RATE: 20 BRPM | OXYGEN SATURATION: 97 % | HEIGHT: 63 IN | TEMPERATURE: 98 F | BODY MASS INDEX: 24.1 KG/M2

## 2023-05-02 PROCEDURE — 83036 HEMOGLOBIN A1C: ICD-10-PCS | Mod: ,,, | Performed by: CLINICAL MEDICAL LABORATORY

## 2023-05-02 PROCEDURE — 83036 HEMOGLOBIN GLYCOSYLATED A1C: CPT | Mod: ,,, | Performed by: CLINICAL MEDICAL LABORATORY

## 2023-05-02 RX ORDER — LEVOTHYROXINE SODIUM 88 UG/1
88 TABLET ORAL
COMMUNITY

## 2023-05-02 NOTE — PROGRESS NOTES
Sharee Elizondo is a 83 y.o. female seen today for follow-up on her diabetes.  Patient's daughter is here with her and reports the patient has episodes where she will freeze in place and has her right back and forth to move.  She is very concerned about Parkinson's disease.  Patient also has occasional tremors and we discussed a neurology evaluation.    Past Medical History:   Diagnosis Date    Acute superficial gastritis without hemorrhage 2/18/2022    Atrial fibrillation     Automatic implantable cardiac defibrillator in situ     Bilateral primary osteoarthritis of knee     Cervical spondylosis without myelopathy     CHF (congestive heart failure)     Chronic ischemic heart disease     Chronic kidney disease (CKD), stage III (moderate)     Chronic pain syndrome     COPD (chronic obstructive pulmonary disease)     Coronary arteriosclerosis     Depressive disorder     Esophageal dysphagia 2/18/2022    GERD (gastroesophageal reflux disease)     HH (hiatus hernia) 2/18/2022    Hypothyroidism     Low back pain     Lumbar radiculopathy     Melanoma 2019    scalp    Metabolic syndrome     Myocardial ischemia     Other long term (current) drug therapy     Radiculopathy, cervical region     Type 2 diabetes mellitus      Family History   Problem Relation Age of Onset    Hypertension Mother     Diabetes Mellitus Mother      Current Outpatient Medications on File Prior to Visit   Medication Sig Dispense Refill    amitriptyline (ELAVIL) 25 MG tablet Take 1 tablet (25 mg total) by mouth nightly as needed for Insomnia. 90 tablet 1    ascorbic acid, vitamin C, (VITAMIN C) 500 MG tablet Take 500 mg by mouth once daily.      blood sugar diagnostic (TRUE METRIX GLUCOSE TEST STRIP) Strp TEST BLOOD SUGAR TWICE DAILY FOR DIABETES 200 strip 5    blood-glucose meter Misc Use to check blood glucose twice daily for diabetes, E11.9 1 each 0    carvediloL (COREG) 3.125 MG tablet TAKE 1 TABLET TWICE DAILY WITH MEALS 180 tablet 1    cholecalciferol,  vitamin D3, (VITAMIN D3) 25 mcg (1,000 unit) capsule Take 2,000 Units by mouth 2 (two) times daily.      digoxin (LANOXIN) 125 mcg tablet Take 125 mcg by mouth once daily.      dulaglutide (TRULICITY) 1.5 mg/0.5 mL pen injector INJECT 1.5 MG INTO THE SKIN EVERY 7 DAYS. 12 pen 1    esomeprazole (NEXIUM) 40 MG capsule Take 1 capsule (40 mg total) by mouth once daily. 90 capsule 1    fluticasone propionate (FLONASE) 50 mcg/actuation nasal spray 2 sprays (100 mcg total) by Each Nostril route once daily. 48 g 2    furosemide (LASIX) 40 MG tablet Take 1 tablet (40 mg total) by mouth once daily. 90 tablet 1    gabapentin (NEURONTIN) 100 MG capsule Take 1 capsule (100 mg total) by mouth every 8 (eight) hours. 270 capsule 2    HYDROcodone-acetaminophen (NORCO) 7.5-325 mg per tablet Take 1 tablet by mouth every 8 (eight) hours as needed for Pain. 90 tablet 0    HYDROcodone-acetaminophen (NORCO) 7.5-325 mg per tablet Take 1 tablet by mouth every 8 (eight) hours as needed for Pain. 90 tablet 0    HYDROcodone-acetaminophen (NORCO) 7.5-325 mg per tablet Take 1 tablet by mouth every 8 (eight) hours as needed for Pain. 90 tablet 0    icosapent ethyL (VASCEPA) 0.5 gram Cap Take 1 capsule by mouth 2 (two) times a day. 180 capsule 1    insulin degludec (TRESIBA FLEXTOUCH U-100) 100 unit/mL (3 mL) insulin pen INJECT 10 UNITS SUBCUTANEOUSLY EVERY DAY 3 pen 2    lancets (TRUEPLUS LANCETS) 33 gauge Misc CHECK BLOOD SUGAR TWICE DAILY FOR DIABETES 100 each 5    levothyroxine (SYNTHROID) 88 MCG tablet Take 88 mcg by mouth before breakfast.      lisinopriL (PRINIVIL,ZESTRIL) 5 MG tablet Take 1 tablet (5 mg total) by mouth once daily. 90 tablet 1    methocarbamoL (ROBAXIN) 500 MG Tab Take 1 tablet (500 mg total) by mouth 2 (two) times daily. 180 tablet 1    montelukast (SINGULAIR) 10 mg tablet Take 1 tablet (10 mg total) by mouth every evening. 90 tablet 1    nitroGLYCERIN (NITROSTAT) 0.4 MG SL tablet Place 1 tablet (0.4 mg total) under the  "tongue every 5 (five) minutes as needed for Chest pain. 30 tablet 2    pen needle, diabetic 32 gauge x 1/4" Ndle Use daily to check blood glucose 100 each 2    rosuvastatin (CRESTOR) 40 MG Tab Take 1 tablet (40 mg total) by mouth every evening. 90 tablet 1    sertraline (ZOLOFT) 50 MG tablet Take 1 tablet (50 mg total) by mouth once daily. 90 tablet 1    spironolactone (ALDACTONE) 25 MG tablet Take 1 tablet (25 mg total) by mouth once daily. (Patient taking differently: Take 0.5 tablet by mouth daily if blood pressure is low or take 1 tablet by mouth daily if blood pressure is within normal limits) 90 tablet 1    levothyroxine (SYNTHROID) 75 MCG tablet Take 1 tablet (75 mcg total) by mouth once daily. (Patient not taking: Reported on 5/2/2023) 90 tablet 1     No current facility-administered medications on file prior to visit.     Immunization History   Administered Date(s) Administered    COVID-19, MRNA, LN-S, PF (MODERNA FULL 0.5 ML DOSE) 03/10/2021, 04/07/2021    Influenza (FLUAD) - Quadrivalent - Adjuvanted - PF *Preferred* (65+) 10/27/2022    Influenza - Quadrivalent - High Dose - PF (65 years and older) 10/06/2021    Pneumococcal Conjugate - 20 Valent 01/18/2023       Review of Systems   Constitutional:  Negative for fever, malaise/fatigue and weight loss.   Respiratory:  Negative for shortness of breath.    Cardiovascular:  Negative for chest pain and palpitations.   Gastrointestinal:  Negative for nausea and vomiting.   Musculoskeletal:  Negative for falls.   Psychiatric/Behavioral:  Negative for depression.       Vitals:    05/02/23 0844   BP: (!) 100/54   Pulse: 80   Resp: 20   Temp: 98.4 °F (36.9 °C)       Physical Exam  Vitals reviewed.   Constitutional:       Appearance: Normal appearance.   HENT:      Head: Normocephalic.   Eyes:      Extraocular Movements: Extraocular movements intact.      Conjunctiva/sclera: Conjunctivae normal.      Pupils: Pupils are equal, round, and reactive to light.   Neck:    "   Thyroid: No thyroid mass or thyromegaly.   Cardiovascular:      Rate and Rhythm: Normal rate and regular rhythm.      Heart sounds: Normal heart sounds. No murmur heard.    No gallop.   Pulmonary:      Effort: Pulmonary effort is normal. No respiratory distress.      Breath sounds: Normal breath sounds. No wheezing or rales.   Skin:     General: Skin is warm and dry.      Coloration: Skin is not jaundiced or pale.   Neurological:      General: No focal deficit present.      Mental Status: She is alert and oriented to person, place, and time.      Cranial Nerves: No cranial nerve deficit.      Motor: No weakness.      Gait: Gait normal.   Psychiatric:         Mood and Affect: Mood normal.         Behavior: Behavior normal.         Thought Content: Thought content normal.         Judgment: Judgment normal.        Assessment and Plan  Occasional tremors  -     Ambulatory referral/consult to Neurology; Future; Expected date: 05/09/2023    Transient paralysis  -     Ambulatory referral/consult to Neurology; Future; Expected date: 05/09/2023    Type 2 diabetes mellitus with other specified complication, without long-term current use of insulin  -     Hemoglobin A1C; Future; Expected date: 05/02/2023            Return to clinic in 3 months or as needed once her lab work is in.    Health Maintenance Topics with due status: Not Due       Topic Last Completion Date    Lipid Panel 07/21/2022    DEXA Scan 08/29/2022    Diabetes Urine Screening 10/27/2022    Hemoglobin A1c 01/18/2023

## 2023-05-03 LAB
EST. AVERAGE GLUCOSE BLD GHB EST-MCNC: 147 MG/DL
HBA1C MFR BLD HPLC: 7 % (ref 4.5–6.6)

## 2023-05-03 RX ORDER — DULAGLUTIDE 1.5 MG/.5ML
1.5 INJECTION, SOLUTION SUBCUTANEOUS
Qty: 3 PEN | Refills: 2 | Status: SHIPPED | OUTPATIENT
Start: 2023-05-03 | End: 2023-06-14 | Stop reason: SDUPTHER

## 2023-05-08 ENCOUNTER — TELEPHONE (OUTPATIENT)
Dept: FAMILY MEDICINE | Facility: CLINIC | Age: 83
End: 2023-05-08
Payer: MEDICARE

## 2023-05-08 NOTE — LETTER
May 8, 2023    Sharee Elizondo  [address] [facility] [place] [address]             [facility]  [address] [place] [address] [place] [address]  Phone: [phone]  Fax: [phone] Dear Ms. Caro:    Your recent A1c was to goal.     If you have any questions or concerns, please don't hesitate to call.    Sincerely,      Dawn Martinez lpn

## 2023-05-22 ENCOUNTER — PATIENT OUTREACH (OUTPATIENT)
Dept: ADMINISTRATIVE | Facility: HOSPITAL | Age: 83
End: 2023-05-22
Payer: MEDICAID

## 2023-05-22 NOTE — PROGRESS NOTES
Health maintenance record review for population health care gaps    Retrieved recent date of EGFR for human[employer] compliance gap

## 2023-05-24 NOTE — PROGRESS NOTES
Subjective:         Patient ID: Sharee Elizondo is a 83 y.o. female.    Chief Complaint: Neck Pain and Back Pain        Pain  This is a chronic problem. The current episode started more than 1 year ago. The problem occurs daily. The problem has been unchanged. Associated symptoms include arthralgias and neck pain. Pertinent negatives include no anorexia, chest pain, chills, coughing, diaphoresis, fever, sore throat, urinary symptoms, vertigo or vomiting.   Review of Systems   Constitutional:  Negative for activity change, appetite change, chills, diaphoresis, fever and unexpected weight change.   HENT:  Negative for drooling, ear discharge, ear pain, facial swelling, mouth dryness, nosebleeds, sore throat, trouble swallowing, voice change and goiter.    Eyes:  Negative for photophobia, pain, discharge, redness and visual disturbance.   Respiratory:  Negative for apnea, cough, choking, chest tightness, shortness of breath, wheezing and stridor.    Cardiovascular:  Negative for chest pain, palpitations and leg swelling.   Gastrointestinal:  Negative for abdominal distention, anorexia, diarrhea, rectal pain, vomiting and fecal incontinence.   Endocrine: Negative for cold intolerance, heat intolerance, polydipsia, polyphagia and polyuria.   Genitourinary:  Negative for bladder incontinence, dysuria, flank pain, frequency and hot flashes.   Musculoskeletal:  Positive for arthralgias, back pain, leg pain and neck pain.   Integumentary:  Negative for color change and pallor.   Allergic/Immunologic: Negative for immunocompromised state.   Neurological:  Negative for dizziness, vertigo, seizures, syncope, facial asymmetry, speech difficulty, light-headedness, coordination difficulties, memory loss and coordination difficulties.   Hematological:  Negative for adenopathy. Does not bruise/bleed easily.   Psychiatric/Behavioral:  Negative for agitation, behavioral problems, confusion, decreased concentration, dysphoric mood,  hallucinations, self-injury and suicidal ideas. The patient is not nervous/anxious and is not hyperactive.          Past Medical History:   Diagnosis Date    Acute superficial gastritis without hemorrhage 2/18/2022    Atrial fibrillation     Automatic implantable cardiac defibrillator in situ     Bilateral primary osteoarthritis of knee     Cervical spondylosis without myelopathy     CHF (congestive heart failure)     Chronic ischemic heart disease     Chronic kidney disease (CKD), stage III (moderate)     Chronic pain syndrome     COPD (chronic obstructive pulmonary disease)     Coronary arteriosclerosis     Depressive disorder     Esophageal dysphagia 2/18/2022    GERD (gastroesophageal reflux disease)     HH (hiatus hernia) 2/18/2022    Hypothyroidism     Low back pain     Lumbar radiculopathy     Melanoma 2019    scalp    Metabolic syndrome     Myocardial ischemia     Other long term (current) drug therapy     Radiculopathy, cervical region     Type 2 diabetes mellitus      Past Surgical History:   Procedure Laterality Date    BACK SURGERY      bilateral knee injection/ aspiration of joint/ bursa-large  Bilateral 4/4/2017 3/27/2017 3/20/2017    bilateral knee orthovisc injection    CARDIAC CATHETERIZATION      CARDIAC SURGERY      EPIDURAL STEROID INJECTION INTO CERVICAL SPINE  6/19/2019 5/17/2017 4/26/2017    C6-7 LONNIEDr Farah    EPIDURAL STEROID INJECTION INTO LUMBAR SPINE  03/15/2018    L4-5 Dr Gagan FLEMING    HYSTERECTOMY      INJECTION OF ANESTHETIC AGENT AROUND MEDIAL BRANCH NERVES INNERVATING LUMBAR FACET JOINT Bilateral 04/14/2022    Procedure: Block-nerve-medial branch-lumbar, bilateral L4 through S1;  Surgeon: Angie Farah MD;  Location: [facility];  Service: Pain Management;  Laterality: Bilateral;  pt aware at visit to be tested  4/12 patient getting covid test today    INJECTION OF FACET JOINT Bilateral 8/17/2017 7/19/2017    Bilateral C3-7 FIDr Farah  "   INJECTION OF FACET JOINT Left 10/18/2018    left C3-7 FI, Dr Farah    INJECTION OF FACET JOINT Bilateral 8/22/2018 6/29/2018    bilateral L3-S1 FI, Dr Farah    RADIOFREQUENCY ABLATION Right 12/12/2018    Right C3-7 RF, Dr Farah    RADIOFREQUENCY ABLATION Left 01/9/2019 10/16/2017 10/7/2020    Left C3-7 RF, Dr Farah    RADIOFREQUENCY ABLATION Left 1/9/2019 10/16/2017    left C3-7 RF, Dr Farah    RADIOFREQUENCY THERMAL COAGULATION OF MEDIAL BRANCH OF POSTERIOR RAMUS OF CERVICAL SPINAL NERVE Left 08/05/2021    Procedure: RADIOFREQUENCY THERMAL COAGULATION, NERVE, SPINAL, CERVICAL, POSTERIOR RAMUS, MEDIAL BRANCH, Left C3-4,4-5  only 2 levels resubmitted dt denied all levels on 8-3-2021;  Surgeon: Angie Farah MD;  Location: [facility];  Service: Pain Management;  Laterality: Left;    RADIOFREQUENCY THERMAL COAGULATION OF MEDIAL BRANCH OF POSTERIOR RAMUS OF CERVICAL SPINAL NERVE Right 08/24/2021    Procedure: RADIOFREQUENCY THERMAL COAGULATION, NERVE, SPINAL, CERVICAL, POSTERIOR RAMUS, MEDIAL BRANCH RIGHT C3-C5 RFTC;  Surgeon: Angie Farah MD;  Location: [facility];  Service: Pain Management;  Laterality: Right;  HAD VAC WILL BRING CARD    TRANSFORAMINAL EPIDURAL INJECTION OF STEROID Right 5/30/2018 5/2/2018    Right L4-5 TFESI, Dr Farah     Social History     Socioeconomic History    Marital status:    Tobacco Use    Smoking status: Every Day     Packs/day: 0.50     Years: 40.00     Pack years: 20.00     Types: Cigarettes     Passive exposure: Current    Smokeless tobacco: Never   Substance and Sexual Activity    Alcohol use: Not Currently    Drug use: Never     Family History   Problem Relation Age of Onset    Hypertension Mother     Diabetes Mellitus Mother      Review of patient's allergies indicates:  No Known Allergies     Objective:  Vitals:    05/25/23 1020   BP: (!) 101/52   Pulse: 81   Resp: 18   Weight: 61.7 kg (136 lb)   Height: 5' 3" (1.6 m)   PainSc: " 0-No pain           Physical Exam  Vitals and nursing note reviewed. Exam conducted with a chaperone present.   Constitutional:       General: She is awake. She is not in acute distress.     Appearance: Normal appearance. She is not ill-appearing or toxic-appearing.   HENT:      Head: Normocephalic and atraumatic.      Nose: Nose normal.      Mouth/Throat:      Mouth: Mucous membranes are moist.      Pharynx: Oropharynx is clear.   Eyes:      Conjunctiva/sclera: Conjunctivae normal.      Pupils: Pupils are equal, round, and reactive to light.   Pulmonary:      Effort: Pulmonary effort is normal. No respiratory distress.   Abdominal:      Palpations: Abdomen is soft.      Tenderness: There is no guarding.   Musculoskeletal:         General: Normal range of motion.      Cervical back: Normal range of motion and neck supple. Tenderness present. No rigidity.      Thoracic back: Tenderness present.      Lumbar back: Tenderness present.   Skin:     General: Skin is warm and dry.      Coloration: Skin is not jaundiced or pale.   Neurological:      General: No focal deficit present.      Mental Status: She is alert and oriented to person, place, and time. Mental status is at baseline.      Cranial Nerves: No cranial nerve deficit (II-XII).      Gait: Gait abnormal.   Psychiatric:         Mood and Affect: Mood normal.         Behavior: Behavior normal. Behavior is cooperative.         Thought Content: Thought content normal.         CT Cervical Spine Without Contrast  Narrative: EXAMINATION:  CT CERVICAL SPINE WITHOUT CONTRAST    CLINICAL HISTORY:  CervicalgiaNeck pain, chronic;    COMPARISON:  Cervical spine CT June 6, 2019    TECHNIQUE:  Multiple axial tomographic images of the cervical spine were obtained without the use of intravenous contrast. Coronal and sagittal reformatted images provided.    FINDINGS:  Chronic appearing mild-to-moderate anterior wedging of the C5 vertebral body.  Moderate loss of disc space height  at C4-5 and C6-7.  Moderate to severe loss of disc space height at C5-6.  Multilevel mild marginal vertebral body osteophyte formation.  Scattered posterior facet and uncovertebral joint hypertrophy.  There is severe left-sided neural foraminal narrowing noted at the C5-6 level.  Mild-to-moderate left-sided neural foraminal narrowing noted at the C4-5 level.  Mild-to-moderate bilateral neural foraminal narrowing noted at the C6-7 level.  Small posterior disc osteophyte formation noted at several levels with mild spinal canal narrowing.  This is greatest at the C5-6 level. There is straightening of normal cervical lordosis which may be positional or secondary to muscle spasm. There is no significant anterolisthesis or retrolisthesis.  Prior sternotomy.  Partially visualized cardiac conduction device within the left chest.  Impression: Degenerative change and straightening of the cervical spine as detailed above.  There is no high-grade spinal canal narrowing.  Neural foraminal narrowing is greatest on the left at C5-6 where disc considered severe.  Chronic appearing mild-to-moderate anterior wedging of C5 vertebral body.  Other/detailed findings as above.    The CT exam was performed using one or more of the following dose    reduction techniques- Automated exposure control, adjustment of the mA    and/or kV according to patient size, and/or use of iterative    reconstructed technique.    Point of Service: [facility]    Electronically signed by: Tao Dexter  Date:    04/17/2023  Time:    14:01  EGD w Dilation  Narrative: Table formatting from the original result was not included.  Procedure Date  4/17/23    Impression  Overall   Impression:  Mucosa of the esophagus is grossly normal. The distal   esophagus was biopsied and the esophagus was dilated with a 48F Bejarano   dilator. Mucosa of the stomach had gastritis without ulcers and biopsies   were obtained. Mucosa of the duodenum was  normal.    Recommendation   Await pathology results     Continue PPI or H2RA therapy, avoid nsaids; repeat  esophageal dilation   prn  Discharge:  Disp: DC to home in stable condition. Resume diet. No driving x 24 hours.   F/U with PCP as scheduled.  Dx: esophageal dysphagia, gastritis    Indication  Dysphagia, unspecified type    Providers  Kelsey Pereira Technician   JAE De Leon CRNA, RN Registered Nurse   Michael Long MD Proceduralist     Medications  Moderate sedation administered by anesthesia staff - See anesthesia   record.    Preprocedure  A history and physical has been performed, and patient medication   allergies have been reviewed. The patient's tolerance of previous   anesthesia has been reviewed. The risks and benefits of the procedure and   the sedation options and risks were discussed with the patient. All   questions were answered and informed consent obtained.    ASA Score: ASA 2 - Patient with mild systemic disease with no functional   limitations  Mallampati Airway Score: II (hard and soft palate, upper portion of   tonsils anduvula visible)    Details of the Procedure  The patient underwent monitored anesthesia care, which was administered by   an anesthesia professional. The patient's blood pressure, heart rate,   level of consciousness, oxygen, respirations, ECG and ETCO2 were monitored   throughout the procedure. The scope was introduced through the mouth and   advanced to the third part of the duodenum. Retroflexion was performed in   the cardia. Prior to the procedure, the patient's H. Pylori status was   unknown. The patient's estimated blood loss was minimal (<5 mL). The   procedure was not difficult. The patient tolerated the procedure well.   There were no apparent complications.     Scope: Gastroscope  Scope Serial: [id number]    Events  Procedure Events   Event Event Time     Procedure Events   Event Event Time   SCOPE IN 4/17/2023 10:18 AM   SCOPE  OUT 4/17/2023 10:22 AM     Findings  Performed multiple forceps biopsies in the esophagus and stomach  Erythematous mucosa in the fundus of the stomach and antrum; performed   cold forceps biopsy  Dilated in the esophagus with Bejarano dilator         Office Visit on 05/01/2023   Component Date Value Ref Range Status    Hemoglobin A1C 05/02/2023 7.0 (H)  4.5 - 6.6 % Final    Estimated Average Glucose 05/02/2023 147  mg/dL Final   Hospital Outpatient Visit on 04/17/2023   Component Date Value Ref Range Status    Case Report 04/17/2023    Final                    Value:Surgical Pathology                                Case: [id number]                                   Authorizing Provider:  Michael Long MD      Collected:           04/17/2023 10:20 AM          Ordering Location:     [facility] -         Received:            04/17/2023 12:32 PM                                 Endoscopy                                                                    Pathologist:           Rene Alegre MD                                                            Specimens:   A) - Stomach, a. stomach bx                                                                         B) - Esophagus, b. esophagus bx                                                            Final Diagnosis 04/17/2023    Final                    Value:This result contains rich text formatting which cannot be displayed here.    Gross Description 04/17/2023    Final                    Value:This result contains rich text formatting which cannot be displayed here.    Microscopic Description 04/17/2023    Final                    Value:This result contains rich text formatting which cannot be displayed here.    Laboratory Notes 04/17/2023    Final                    Value:This result contains rich text formatting which cannot be displayed here.   Office Visit on 02/22/2023   Component Date Value Ref Range Status    POC Amphetamines 02/22/2023  Negative  Negative, Inconclusive Final    POC Barbiturates 02/22/2023 Negative  Negative, Inconclusive Final    POC Benzodiazepines 02/22/2023 Negative  Negative, Inconclusive Final    POC Cocaine 02/22/2023 Negative  Negative, Inconclusive Final    POC THC 02/22/2023 Negative  Negative, Inconclusive Final    POC Methadone 02/22/2023 Negative  Negative, Inconclusive Final    POC Methamphetamine 02/22/2023 Negative  Negative, Inconclusive Final    POC Opiates 02/22/2023 Presumptive Positive (A)  Negative, Inconclusive Final    POC Oxycodone 02/22/2023 Negative  Negative, Inconclusive Final    POC Phencyclidine 02/22/2023 Negative  Negative, Inconclusive Final    POC Methylenedioxymethamphetamine * 02/22/2023 Negative  Negative, Inconclusive Final    POC Tricyclic Antidepressants 02/22/2023 Negative  Negative, Inconclusive Final    POC Buprenorphine 02/22/2023 Negative   Final     Acceptable 02/22/2023 Yes   Final    POC Temperature (Urine) 02/22/2023 90   Final   Office Visit on 01/18/2023   Component Date Value Ref Range Status    Hemoglobin A1C 01/18/2023 6.4  4.5 - 6.6 % Final    Estimated Average Glucose 01/18/2023 127  mg/dL Final    Free T4 01/18/2023 1.21  0.76 - 1.46 ng/dL Final    TSH 01/18/2023 0.552  0.358 - 3.740 uIU/mL Final    Sodium 01/18/2023 138  136 - 145 mmol/L Final    Potassium 01/18/2023 3.3 (L)  3.5 - 5.1 mmol/L Final    Chloride 01/18/2023 100  98 - 107 mmol/L Final    CO2 01/18/2023 28  21 - 32 mmol/L Final    Anion Gap 01/18/2023 13  7 - 16 mmol/L Final    Glucose 01/18/2023 152 (H)  74 - 106 mg/dL Final    BUN 01/18/2023 17  7 - 18 mg/dL Final    Creatinine 01/18/2023 1.18 (H)  0.55 - 1.02 mg/dL Final    BUN/Creatinine Ratio 01/18/2023 14  6 - 20 Final    Calcium 01/18/2023 9.5  8.5 - 10.1 mg/dL Final    Total Protein 01/18/2023 6.7  6.4 - 8.2 g/dL Final    Albumin 01/18/2023 3.8  3.5 - 5.0 g/dL Final    Globulin 01/18/2023 2.9  2.0 - 4.0 g/dL  Final    A/G Ratio 01/18/2023 1.3   Final    Bilirubin, Total 01/18/2023 0.4  >0.0 - 1.2 mg/dL Final    Alk Phos 01/18/2023 89  55 - 142 U/L Final    ALT 01/18/2023 13  13 - 56 U/L Final    AST 01/18/2023 11 (L)  15 - 37 U/L Final    eGFR 01/18/2023 46 (L)  >=60 mL/min/1.73m² Final    WBC 01/18/2023 8.22  4.50 - 11.00 K/uL Final    RBC 01/18/2023 3.74 (L)  4.20 - 5.40 M/uL Final    Hemoglobin 01/18/2023 11.5 (L)  12.0 - 16.0 g/dL Final    Hematocrit 01/18/2023 35.5 (L)  38.0 - 47.0 % Final    MCV 01/18/2023 94.9  80.0 - 96.0 fL Final    MCH 01/18/2023 30.7  27.0 - 31.0 pg Final    MCHC 01/18/2023 32.4  32.0 - 36.0 g/dL Final    RDW 01/18/2023 13.1  11.5 - 14.5 % Final    Platelet Count 01/18/2023 260  150 - 400 K/uL Final    MPV 01/18/2023 10.7  9.4 - 12.4 fL Final    Neutrophils % 01/18/2023 60.0  53.0 - 65.0 % Final    Lymphocytes % 01/18/2023 26.4 (L)  27.0 - 41.0 % Final    Monocytes % 01/18/2023 7.1 (H)  2.0 - 6.0 % Final    Eosinophils % 01/18/2023 5.4 (H)  1.0 - 4.0 % Final    Basophils % 01/18/2023 0.7  0.0 - 1.0 % Final    Immature Granulocytes % 01/18/2023 0.4  0.0 - 0.4 % Final    nRBC, Auto 01/18/2023 0.0  <=0.0 % Final    Neutrophils, Abs 01/18/2023 4.94  1.80 - 7.70 K/uL Final    Lymphocytes, Absolute 01/18/2023 2.17  1.00 - 4.80 K/uL Final    Monocytes, Absolute 01/18/2023 0.58  0.00 - 0.80 K/uL Final    Eosinophils, Absolute 01/18/2023 0.44  0.00 - 0.50 K/uL Final    Basophils, Absolute 01/18/2023 0.06  0.00 - 0.20 K/uL Final    Immature Granulocytes, Absolute 01/18/2023 0.03  0.00 - 0.04 K/uL Final    nRBC, Absolute 01/18/2023 0.00  <=0.00 x10e3/uL Final    Diff Type 01/18/2023 Auto   Final         Orders Placed This Encounter   Procedures    POCT Urine Drug Screen Presump     Interpretive Information:     Negative:  No drug detected at the cut off level.   Positive:  This result represents presumptive positive for the   tested drug, other substances may yield a  positive response other   than the analyte of interest. This result should be utilized for   diagnostic purpose only. Confirmation testing will be performed upon physician request only.            Requested Prescriptions     Signed Prescriptions Disp Refills    gabapentin (NEURONTIN) 100 MG capsule 270 capsule 2     Sig: Take 1 capsule (100 mg total) by mouth every 8 (eight) hours.    HYDROcodone-acetaminophen (NORCO) 7.5-325 mg per tablet 90 tablet 0     Sig: Take 1 tablet by mouth every 8 (eight) hours as needed for Pain.    HYDROcodone-acetaminophen (NORCO) 7.5-325 mg per tablet 90 tablet 0     Sig: Take 1 tablet by mouth every 8 (eight) hours as needed for Pain.    HYDROcodone-acetaminophen (NORCO) 7.5-325 mg per tablet 90 tablet 0     Sig: Take 1 tablet by mouth every 8 (eight) hours as needed for Pain.       Assessment:     1. 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 Osteoarthrosis multiple sites, not specified as generalized    3. Chronic pain of right knee    4. Lumbar radiculopathy    5. Thoracic radiculopathy    6. Encounter for long-term (current) use of other medications           A's of Opioid Risk Assessment  Activity:Patient can perform ADL.   Analgesia:Patients pain is partially controlled by current medication. Patient has tried OTC medications such as Tylenol and Ibuprofen with out relief.   Adverse Effects: Patient denies constipation or sedation.  Aberrant Behavior:  reviewed with no aberrant drug seeking/taking behavior.  Overdose reversal drug naloxone discussed    Drug screen reviewed    X-ray bilateral knee [facility] September 4, 2020 degenerative changes noted no fracture noted    CT thoracic spine degenerative changes throughout the thoracic spine radiologist did find a noncalcified 5-6 mm lesion left lower lung area recommended follow-up with CT chest 6 months   Primary care provider following this      Plan:    Narcan November 2022    Cardiac defibrillator cannot  have MRI    Follow-up after CT cervical spine    CT cervical spine [facility] April 3, 2023, multiple level degenerative changes no high-grade spinal canal narrowing neuroforaminal narrowing greatest left C5/6 severe mild-to-moderate anterior wedging C5 vertebral body    History lumbar surgery 2005, Dr. Woods [facility]    Recovering after cardiac catheterization with stent placement     Now anticoagulated after cardiac stent placement    After discussing options she would like to continue with conservative management until Cardiology releases her from her anticoagulant    Continue home exercise program as directed    Follow-up 3 months    Dr. Farah November 2023    Bring original prescription medication bottles/container/box with labels to each visit

## 2023-05-25 ENCOUNTER — OFFICE VISIT (OUTPATIENT)
Dept: PAIN MEDICINE | Facility: CLINIC | Age: 83
End: 2023-05-25
Payer: MEDICARE

## 2023-05-25 VITALS
RESPIRATION RATE: 18 BRPM | SYSTOLIC BLOOD PRESSURE: 101 MMHG | HEIGHT: 63 IN | WEIGHT: 136 LBS | DIASTOLIC BLOOD PRESSURE: 52 MMHG | BODY MASS INDEX: 24.1 KG/M2 | HEART RATE: 81 BPM

## 2023-05-25 DIAGNOSIS — M47.812 SPONDYLOSIS OF CERVICAL JOINT WITHOUT MYELOPATHY: Primary | Chronic | ICD-10-CM

## 2023-05-25 DIAGNOSIS — G89.29 CHRONIC PAIN OF RIGHT KNEE: ICD-10-CM

## 2023-05-25 DIAGNOSIS — M25.561 CHRONIC PAIN OF RIGHT KNEE: ICD-10-CM

## 2023-05-25 DIAGNOSIS — M89.49 OSTEOARTHROSIS MULTIPLE SITES, NOT SPECIFIED AS GENERALIZED: Chronic | ICD-10-CM

## 2023-05-25 DIAGNOSIS — Z79.899 ENCOUNTER FOR LONG-TERM (CURRENT) USE OF OTHER MEDICATIONS: ICD-10-CM

## 2023-05-25 DIAGNOSIS — M54.16 LUMBAR RADICULOPATHY: Chronic | ICD-10-CM

## 2023-05-25 DIAGNOSIS — M54.14 THORACIC RADICULOPATHY: Chronic | ICD-10-CM

## 2023-05-25 PROBLEM — M54.12 CERVICAL RADICULOPATHY: Chronic | Status: ACTIVE | Noted: 2023-05-25

## 2023-05-25 PROCEDURE — 1159F MED LIST DOCD IN RCRD: CPT | Mod: CPTII,,, | Performed by: PHYSICIAN ASSISTANT

## 2023-05-25 PROCEDURE — 1101F PT FALLS ASSESS-DOCD LE1/YR: CPT | Mod: CPTII,,, | Performed by: PHYSICIAN ASSISTANT

## 2023-05-25 PROCEDURE — 99214 OFFICE O/P EST MOD 30 MIN: CPT | Mod: S$PBB,,, | Performed by: PHYSICIAN ASSISTANT

## 2023-05-25 PROCEDURE — 1126F AMNT PAIN NOTED NONE PRSNT: CPT | Mod: CPTII,,, | Performed by: PHYSICIAN ASSISTANT

## 2023-05-25 PROCEDURE — 3288F PR FALLS RISK ASSESSMENT DOCUMENTED: ICD-10-PCS | Mod: CPTII,,, | Performed by: PHYSICIAN ASSISTANT

## 2023-05-25 PROCEDURE — 1126F PR PAIN SEVERITY QUANTIFIED, NO PAIN PRESENT: ICD-10-PCS | Mod: CPTII,,, | Performed by: PHYSICIAN ASSISTANT

## 2023-05-25 PROCEDURE — 99214 PR OFFICE/OUTPT VISIT, EST, LEVL IV, 30-39 MIN: ICD-10-PCS | Mod: S$PBB,,, | Performed by: PHYSICIAN ASSISTANT

## 2023-05-25 PROCEDURE — 3078F PR MOST RECENT DIASTOLIC BLOOD PRESSURE < 80 MM HG: ICD-10-PCS | Mod: CPTII,,, | Performed by: PHYSICIAN ASSISTANT

## 2023-05-25 PROCEDURE — 1159F PR MEDICATION LIST DOCUMENTED IN MEDICAL RECORD: ICD-10-PCS | Mod: CPTII,,, | Performed by: PHYSICIAN ASSISTANT

## 2023-05-25 PROCEDURE — 80305 DRUG TEST PRSMV DIR OPT OBS: CPT | Mod: PBBFAC | Performed by: PHYSICIAN ASSISTANT

## 2023-05-25 PROCEDURE — 3078F DIAST BP <80 MM HG: CPT | Mod: CPTII,,, | Performed by: PHYSICIAN ASSISTANT

## 2023-05-25 PROCEDURE — 99215 OFFICE O/P EST HI 40 MIN: CPT | Mod: PBBFAC | Performed by: PHYSICIAN ASSISTANT

## 2023-05-25 PROCEDURE — 1101F PR PT FALLS ASSESS DOC 0-1 FALLS W/OUT INJ PAST YR: ICD-10-PCS | Mod: CPTII,,, | Performed by: PHYSICIAN ASSISTANT

## 2023-05-25 PROCEDURE — 3074F PR MOST RECENT SYSTOLIC BLOOD PRESSURE < 130 MM HG: ICD-10-PCS | Mod: CPTII,,, | Performed by: PHYSICIAN ASSISTANT

## 2023-05-25 PROCEDURE — 3288F FALL RISK ASSESSMENT DOCD: CPT | Mod: CPTII,,, | Performed by: PHYSICIAN ASSISTANT

## 2023-05-25 PROCEDURE — 3074F SYST BP LT 130 MM HG: CPT | Mod: CPTII,,, | Performed by: PHYSICIAN ASSISTANT

## 2023-05-25 RX ORDER — HYDROCODONE BITARTRATE AND ACETAMINOPHEN 7.5; 325 MG/1; MG/1
1 TABLET ORAL EVERY 8 HOURS PRN
Qty: 90 TABLET | Refills: 0 | Status: SHIPPED | OUTPATIENT
Start: 2023-05-31 | End: 2023-07-26 | Stop reason: SDUPTHER

## 2023-05-25 RX ORDER — HYDROCODONE BITARTRATE AND ACETAMINOPHEN 7.5; 325 MG/1; MG/1
1 TABLET ORAL EVERY 8 HOURS PRN
Qty: 90 TABLET | Refills: 0 | Status: SHIPPED | OUTPATIENT
Start: 2023-06-30 | End: 2023-08-23 | Stop reason: SDUPTHER

## 2023-05-25 RX ORDER — GABAPENTIN 100 MG/1
100 CAPSULE ORAL EVERY 8 HOURS
Qty: 270 CAPSULE | Refills: 2 | Status: SHIPPED | OUTPATIENT
Start: 2023-05-25 | End: 2024-01-22 | Stop reason: SDUPTHER

## 2023-05-25 RX ORDER — HYDROCODONE BITARTRATE AND ACETAMINOPHEN 7.5; 325 MG/1; MG/1
1 TABLET ORAL EVERY 8 HOURS PRN
Qty: 90 TABLET | Refills: 0 | Status: SHIPPED | OUTPATIENT
Start: 2023-07-29 | End: 2023-08-23 | Stop reason: SDUPTHER

## 2023-05-26 ENCOUNTER — EXTERNAL HOSPITAL ADMISSION (OUTPATIENT)
Dept: ADMINISTRATIVE | Facility: CLINIC | Age: 83
End: 2023-05-26
Payer: MEDICAID

## 2023-05-26 ENCOUNTER — PATIENT OUTREACH (OUTPATIENT)
Dept: ADMINISTRATIVE | Facility: CLINIC | Age: 83
End: 2023-05-26
Payer: MEDICAID

## 2023-05-26 NOTE — PROGRESS NOTES
C3 nurse attempted to contact Sharee Elizondo  for a TCC post hospital discharge follow up call. No answer. No voicemail available.The patient does not have a scheduled HOSFU appointment within 5-7 days of discharge.  Message sent to PCP staff for assistance with scheduling visit with patient.

## 2023-06-06 DIAGNOSIS — E11.9 DIABETES MELLITUS WITHOUT COMPLICATION: ICD-10-CM

## 2023-06-06 RX ORDER — INSULIN DEGLUDEC 100 U/ML
INJECTION, SOLUTION SUBCUTANEOUS
Qty: 15 ML | Refills: 2 | Status: SHIPPED | OUTPATIENT
Start: 2023-06-06 | End: 2023-06-14 | Stop reason: DRUGHIGH

## 2023-06-14 ENCOUNTER — OFFICE VISIT (OUTPATIENT)
Dept: FAMILY MEDICINE | Facility: CLINIC | Age: 83
End: 2023-06-14
Payer: MEDICARE

## 2023-06-14 VITALS
WEIGHT: 136 LBS | OXYGEN SATURATION: 92 % | SYSTOLIC BLOOD PRESSURE: 120 MMHG | TEMPERATURE: 98 F | HEIGHT: 63 IN | RESPIRATION RATE: 18 BRPM | DIASTOLIC BLOOD PRESSURE: 58 MMHG | BODY MASS INDEX: 24.1 KG/M2 | HEART RATE: 74 BPM

## 2023-06-14 DIAGNOSIS — E11.9 DIABETES MELLITUS WITHOUT COMPLICATION: ICD-10-CM

## 2023-06-14 DIAGNOSIS — E11.9 CONTROLLED TYPE 2 DIABETES MELLITUS WITHOUT COMPLICATION, WITHOUT LONG-TERM CURRENT USE OF INSULIN: ICD-10-CM

## 2023-06-14 DIAGNOSIS — M47.812 SPONDYLOSIS OF CERVICAL JOINT WITHOUT MYELOPATHY: ICD-10-CM

## 2023-06-14 DIAGNOSIS — I10 HYPERTENSION, UNSPECIFIED TYPE: ICD-10-CM

## 2023-06-14 DIAGNOSIS — F32.A DEPRESSION, UNSPECIFIED DEPRESSION TYPE: ICD-10-CM

## 2023-06-14 DIAGNOSIS — E11.59 TYPE 2 DIABETES MELLITUS WITH OTHER CIRCULATORY COMPLICATION, WITH LONG-TERM CURRENT USE OF INSULIN: Primary | ICD-10-CM

## 2023-06-14 DIAGNOSIS — J30.9 ALLERGIC RHINITIS, UNSPECIFIED SEASONALITY, UNSPECIFIED TRIGGER: ICD-10-CM

## 2023-06-14 DIAGNOSIS — E78.1 HYPERTRIGLYCERIDEMIA: ICD-10-CM

## 2023-06-14 DIAGNOSIS — K21.9 GASTROESOPHAGEAL REFLUX DISEASE, UNSPECIFIED WHETHER ESOPHAGITIS PRESENT: ICD-10-CM

## 2023-06-14 DIAGNOSIS — Z79.4 TYPE 2 DIABETES MELLITUS WITH OTHER CIRCULATORY COMPLICATION, WITH LONG-TERM CURRENT USE OF INSULIN: Primary | ICD-10-CM

## 2023-06-14 DIAGNOSIS — R05.9 COUGH, UNSPECIFIED TYPE: ICD-10-CM

## 2023-06-14 DIAGNOSIS — E78.5 DYSLIPIDEMIA: ICD-10-CM

## 2023-06-14 PROCEDURE — 1101F PT FALLS ASSESS-DOCD LE1/YR: CPT | Mod: ,,, | Performed by: FAMILY MEDICINE

## 2023-06-14 PROCEDURE — 1160F PR REVIEW ALL MEDS BY PRESCRIBER/CLIN PHARMACIST DOCUMENTED: ICD-10-PCS | Mod: ,,, | Performed by: FAMILY MEDICINE

## 2023-06-14 PROCEDURE — 3288F FALL RISK ASSESSMENT DOCD: CPT | Mod: ,,, | Performed by: FAMILY MEDICINE

## 2023-06-14 PROCEDURE — 3078F DIAST BP <80 MM HG: CPT | Mod: ,,, | Performed by: FAMILY MEDICINE

## 2023-06-14 PROCEDURE — 1101F PR PT FALLS ASSESS DOC 0-1 FALLS W/OUT INJ PAST YR: ICD-10-PCS | Mod: ,,, | Performed by: FAMILY MEDICINE

## 2023-06-14 PROCEDURE — 1126F AMNT PAIN NOTED NONE PRSNT: CPT | Mod: ,,, | Performed by: FAMILY MEDICINE

## 2023-06-14 PROCEDURE — 1126F PR PAIN SEVERITY QUANTIFIED, NO PAIN PRESENT: ICD-10-PCS | Mod: ,,, | Performed by: FAMILY MEDICINE

## 2023-06-14 PROCEDURE — 3288F PR FALLS RISK ASSESSMENT DOCUMENTED: ICD-10-PCS | Mod: ,,, | Performed by: FAMILY MEDICINE

## 2023-06-14 PROCEDURE — 99495 TCM SERVICES (MODERATE COMPLEXITY): ICD-10-PCS | Mod: ,,, | Performed by: FAMILY MEDICINE

## 2023-06-14 PROCEDURE — 1159F PR MEDICATION LIST DOCUMENTED IN MEDICAL RECORD: ICD-10-PCS | Mod: ,,, | Performed by: FAMILY MEDICINE

## 2023-06-14 PROCEDURE — 3078F PR MOST RECENT DIASTOLIC BLOOD PRESSURE < 80 MM HG: ICD-10-PCS | Mod: ,,, | Performed by: FAMILY MEDICINE

## 2023-06-14 PROCEDURE — 1160F RVW MEDS BY RX/DR IN RCRD: CPT | Mod: ,,, | Performed by: FAMILY MEDICINE

## 2023-06-14 PROCEDURE — 1159F MED LIST DOCD IN RCRD: CPT | Mod: ,,, | Performed by: FAMILY MEDICINE

## 2023-06-14 PROCEDURE — 99495 TRANSJ CARE MGMT MOD F2F 14D: CPT | Mod: ,,, | Performed by: FAMILY MEDICINE

## 2023-06-14 PROCEDURE — 3074F SYST BP LT 130 MM HG: CPT | Mod: ,,, | Performed by: FAMILY MEDICINE

## 2023-06-14 PROCEDURE — 3074F PR MOST RECENT SYSTOLIC BLOOD PRESSURE < 130 MM HG: ICD-10-PCS | Mod: ,,, | Performed by: FAMILY MEDICINE

## 2023-06-14 RX ORDER — FLASH GLUCOSE SCANNING READER
EACH MISCELLANEOUS
Qty: 1 EACH | Refills: 5 | Status: SHIPPED | OUTPATIENT
Start: 2023-06-14

## 2023-06-14 RX ORDER — CARVEDILOL 3.12 MG/1
3.12 TABLET ORAL 2 TIMES DAILY WITH MEALS
Qty: 180 TABLET | Refills: 1 | Status: SHIPPED | OUTPATIENT
Start: 2023-06-14 | End: 2023-08-28 | Stop reason: SDUPTHER

## 2023-06-14 RX ORDER — ALBUTEROL SULFATE 90 UG/1
AEROSOL, METERED RESPIRATORY (INHALATION)
COMMUNITY
Start: 2023-05-22 | End: 2023-07-14 | Stop reason: SDUPTHER

## 2023-06-14 RX ORDER — ESOMEPRAZOLE MAGNESIUM 40 MG/1
40 CAPSULE, DELAYED RELEASE ORAL DAILY
Qty: 90 CAPSULE | Refills: 1 | Status: SHIPPED | OUTPATIENT
Start: 2023-06-14 | End: 2023-08-23 | Stop reason: SDUPTHER

## 2023-06-14 RX ORDER — FLASH GLUCOSE SENSOR
KIT MISCELLANEOUS
Qty: 3 KIT | Refills: 5 | Status: SHIPPED | OUTPATIENT
Start: 2023-06-14

## 2023-06-14 RX ORDER — BLOOD SUGAR DIAGNOSTIC
STRIP MISCELLANEOUS
Qty: 100 EACH | Refills: 2 | Status: SHIPPED | OUTPATIENT
Start: 2023-06-14

## 2023-06-14 RX ORDER — MONTELUKAST SODIUM 10 MG/1
10 TABLET ORAL NIGHTLY
Qty: 90 TABLET | Refills: 1 | Status: SHIPPED | OUTPATIENT
Start: 2023-06-14

## 2023-06-14 RX ORDER — AMITRIPTYLINE HYDROCHLORIDE 25 MG/1
25 TABLET, FILM COATED ORAL NIGHTLY PRN
Qty: 90 TABLET | Refills: 1 | Status: SHIPPED | OUTPATIENT
Start: 2023-06-14 | End: 2023-08-28 | Stop reason: SDUPTHER

## 2023-06-14 RX ORDER — FLUTICASONE PROPIONATE 50 MCG
2 SPRAY, SUSPENSION (ML) NASAL DAILY
Qty: 48 G | Refills: 2 | Status: SHIPPED | OUTPATIENT
Start: 2023-06-14

## 2023-06-14 RX ORDER — NITROGLYCERIN 0.4 MG/1
0.4 TABLET SUBLINGUAL EVERY 5 MIN PRN
Qty: 30 TABLET | Refills: 2 | Status: SHIPPED | OUTPATIENT
Start: 2023-06-14

## 2023-06-14 RX ORDER — FUROSEMIDE 40 MG/1
40 TABLET ORAL DAILY
Qty: 90 TABLET | Refills: 1 | Status: SHIPPED | OUTPATIENT
Start: 2023-06-14 | End: 2023-08-28 | Stop reason: SDUPTHER

## 2023-06-14 RX ORDER — METHOCARBAMOL 500 MG/1
500 TABLET, FILM COATED ORAL 2 TIMES DAILY
Qty: 180 TABLET | Refills: 1 | Status: SHIPPED | OUTPATIENT
Start: 2023-06-14

## 2023-06-14 RX ORDER — DULAGLUTIDE 1.5 MG/.5ML
1.5 INJECTION, SOLUTION SUBCUTANEOUS
Qty: 4 PEN | Refills: 5 | Status: SHIPPED | OUTPATIENT
Start: 2023-06-14 | End: 2023-06-14

## 2023-06-14 RX ORDER — ROSUVASTATIN CALCIUM 40 MG/1
40 TABLET, COATED ORAL NIGHTLY
Qty: 90 TABLET | Refills: 1 | Status: SHIPPED | OUTPATIENT
Start: 2023-06-14 | End: 2024-06-13

## 2023-06-14 RX ORDER — SERTRALINE HYDROCHLORIDE 50 MG/1
50 TABLET, FILM COATED ORAL DAILY
Qty: 90 TABLET | Refills: 1 | Status: SHIPPED | OUTPATIENT
Start: 2023-06-14

## 2023-06-14 RX ORDER — DEXTROSE 4 G
TABLET,CHEWABLE ORAL
Qty: 1 EACH | Refills: 0 | Status: SHIPPED | OUTPATIENT
Start: 2023-06-14

## 2023-06-14 RX ORDER — DULAGLUTIDE 1.5 MG/.5ML
1.5 INJECTION, SOLUTION SUBCUTANEOUS
Qty: 12 PEN | Refills: 1 | Status: SHIPPED | OUTPATIENT
Start: 2023-06-14

## 2023-06-14 RX ORDER — ICOSAPENT ETHYL 500 MG/1
1 CAPSULE ORAL 2 TIMES DAILY
Qty: 180 CAPSULE | Refills: 1 | Status: SHIPPED | OUTPATIENT
Start: 2023-06-14 | End: 2023-08-28 | Stop reason: SDUPTHER

## 2023-06-14 RX ORDER — LANCETS 33 GAUGE
EACH MISCELLANEOUS
Qty: 100 EACH | Refills: 5 | Status: SHIPPED | OUTPATIENT
Start: 2023-06-14

## 2023-06-14 NOTE — PROGRESS NOTES
Sharee Elizondo is a 83 y.o. female seen today for hospital follow-up for myocardial infarction.  Patient is recovering well and her medications were reviewed and reconciled and we did review her discharge summary.  She is scheduled to follow-up Cardiology in the next 3 months and currently is having no chest pain arm pain or shortness of breath above baseline.  Patient is concerned that her blood sugars are elevated in the 180s and 190s and we noticed they discharged her on the Trulicity 0.75 does where she had been on the 1.5 mg dose.  Today we restarted the 1.5 mg dose.  We will also set the patient up for a freeOptiniyle [place].    Past Medical History:   Diagnosis Date    Acute superficial gastritis without hemorrhage 2/18/2022    Atrial fibrillation     Automatic implantable cardiac defibrillator in situ     Bilateral primary osteoarthritis of knee     Cervical spondylosis without myelopathy     CHF (congestive heart failure)     Chronic ischemic heart disease     Chronic kidney disease (CKD), stage III (moderate)     Chronic pain syndrome     COPD (chronic obstructive pulmonary disease)     Coronary arteriosclerosis     Depressive disorder     Esophageal dysphagia 2/18/2022    GERD (gastroesophageal reflux disease)     HH (hiatus hernia) 2/18/2022    Hypothyroidism     Low back pain     Lumbar radiculopathy     Melanoma 2019    scalp    Metabolic syndrome     Myocardial ischemia     Other long term (current) drug therapy     Radiculopathy, cervical region     Type 2 diabetes mellitus      Family History   Problem Relation Age of Onset    Hypertension Mother     Diabetes Mellitus Mother      Current Outpatient Medications on File Prior to Visit   Medication Sig Dispense Refill    albuterol (PROVENTIL/VENTOLIN HFA) 90 mcg/actuation inhaler Twice Daily      ascorbic acid, vitamin C, (VITAMIN C) 500 MG tablet Take 500 mg by mouth once daily.      aspirin 81 mg Cap Take 81 mg by mouth Daily.      blood sugar diagnostic (TRUE  METRIX GLUCOSE TEST STRIP) Strp TEST BLOOD SUGAR TWICE DAILY FOR DIABETES 200 strip 5    cholecalciferol, vitamin D3, (VITAMIN D3) 25 mcg (1,000 unit) capsule Take 2,000 Units by mouth 2 (two) times daily.      digoxin (LANOXIN) 125 mcg tablet Take 125 mcg by mouth once daily.      gabapentin (NEURONTIN) 100 MG capsule Take 1 capsule (100 mg total) by mouth every 8 (eight) hours. 270 capsule 2    HYDROcodone-acetaminophen (NORCO) 7.5-325 mg per tablet Take 1 tablet by mouth every 8 (eight) hours as needed for Pain. 90 tablet 0    levothyroxine (SYNTHROID) 88 MCG tablet Take 88 mcg by mouth before breakfast.      TRESIBA FLEXTOUCH U-100 100 unit/mL (3 mL) insulin pen INJECT 10 UNITS UNDER THE SKIN EVERY DAY 15 mL 2    [DISCONTINUED] amitriptyline (ELAVIL) 25 MG tablet Take 1 tablet (25 mg total) by mouth nightly as needed for Insomnia. 90 tablet 1    [DISCONTINUED] blood-glucose meter Misc Use to check blood glucose twice daily for diabetes, E11.9 1 each 0    [DISCONTINUED] carvediloL (COREG) 3.125 MG tablet TAKE 1 TABLET TWICE DAILY WITH MEALS 180 tablet 1    [DISCONTINUED] esomeprazole (NEXIUM) 40 MG capsule Take 1 capsule (40 mg total) by mouth once daily. 90 capsule 1    [DISCONTINUED] fluticasone propionate (FLONASE) 50 mcg/actuation nasal spray 2 sprays (100 mcg total) by Each Nostril route once daily. 48 g 2    [DISCONTINUED] furosemide (LASIX) 40 MG tablet Take 1 tablet (40 mg total) by mouth once daily. 90 tablet 1    [DISCONTINUED] icosapent ethyL (VASCEPA) 0.5 gram Cap Take 1 capsule by mouth 2 (two) times a day. 180 capsule 1    [DISCONTINUED] lancets (TRUEPLUS LANCETS) 33 gauge Misc CHECK BLOOD SUGAR TWICE DAILY FOR DIABETES 100 each 5    [DISCONTINUED] methocarbamoL (ROBAXIN) 500 MG Tab Take 1 tablet (500 mg total) by mouth 2 (two) times daily. 180 tablet 1    [DISCONTINUED] montelukast (SINGULAIR) 10 mg tablet Take 1 tablet (10 mg total) by mouth every evening. 90 tablet 1    [DISCONTINUED]  "nitroGLYCERIN (NITROSTAT) 0.4 MG SL tablet Place 1 tablet (0.4 mg total) under the tongue every 5 (five) minutes as needed for Chest pain. 30 tablet 2    [DISCONTINUED] pen needle, diabetic 32 gauge x 1/4" Ndle Use daily to check blood glucose 100 each 2    [DISCONTINUED] rosuvastatin (CRESTOR) 40 MG Tab Take 1 tablet (40 mg total) by mouth every evening. 90 tablet 1    [DISCONTINUED] sertraline (ZOLOFT) 50 MG tablet Take 1 tablet (50 mg total) by mouth once daily. 90 tablet 1    [DISCONTINUED] TRULICITY 1.5 mg/0.5 mL pen injector INJECT 1.5 MG INTO THE SKIN EVERY 7 DAYS. 3 pen 2    [START ON 6/30/2023] HYDROcodone-acetaminophen (NORCO) 7.5-325 mg per tablet Take 1 tablet by mouth every 8 (eight) hours as needed for Pain. (Patient not taking: Reported on 6/14/2023) 90 tablet 0    [START ON 7/29/2023] HYDROcodone-acetaminophen (NORCO) 7.5-325 mg per tablet Take 1 tablet by mouth every 8 (eight) hours as needed for Pain. (Patient not taking: Reported on 6/14/2023) 90 tablet 0    [DISCONTINUED] lisinopriL (PRINIVIL,ZESTRIL) 5 MG tablet Take 1 tablet (5 mg total) by mouth once daily. (Patient not taking: Reported on 5/25/2023) 90 tablet 1    [DISCONTINUED] spironolactone (ALDACTONE) 25 MG tablet Take 1 tablet (25 mg total) by mouth once daily. (Patient not taking: Reported on 6/14/2023) 90 tablet 1     No current facility-administered medications on file prior to visit.     Immunization History   Administered Date(s) Administered    COVID-19, MRNA, LN-S, PF (MODERNA FULL 0.5 ML DOSE) 03/10/2021, 04/07/2021    Influenza (FLUAD) - Quadrivalent - Adjuvanted - PF *Preferred* (65+) 10/27/2022    Influenza - Quadrivalent - High Dose - PF (65 years and older) 10/06/2021    Pneumococcal Conjugate - 20 Valent 01/18/2023       Review of Systems   Constitutional:  Negative for fever, malaise/fatigue and weight loss.   Respiratory:  Negative for shortness of breath.    Cardiovascular:  Negative for chest pain and palpitations. "   Gastrointestinal:  Negative for nausea and vomiting.   Psychiatric/Behavioral:  Negative for depression.       Vitals:    06/14/23 1356   BP: (!) 120/58   Pulse: 74   Resp: 18   Temp: 98.4 °F (36.9 °C)       Physical Exam  Vitals reviewed.   Constitutional:       Appearance: Normal appearance.   HENT:      Head: Normocephalic.   Eyes:      Extraocular Movements: Extraocular movements intact.      Conjunctiva/sclera: Conjunctivae normal.      Pupils: Pupils are equal, round, and reactive to light.   Neck:      Thyroid: No thyroid mass or thyromegaly.   Cardiovascular:      Rate and Rhythm: Normal rate and regular rhythm.      Heart sounds: Normal heart sounds. No murmur heard.    No gallop.   Pulmonary:      Effort: Pulmonary effort is normal. No respiratory distress.      Breath sounds: Normal breath sounds. No wheezing or rales.   Skin:     General: Skin is warm and dry.      Coloration: Skin is not jaundiced or pale.   Neurological:      Mental Status: She is alert.   Psychiatric:         Mood and Affect: Mood normal.         Behavior: Behavior normal.         Thought Content: Thought content normal.         Judgment: Judgment normal.        Assessment and Plan  Depression, unspecified depression type  -     sertraline (ZOLOFT) 50 MG tablet; Take 1 tablet (50 mg total) by mouth once daily.  Dispense: 90 tablet; Refill: 1    Dyslipidemia  -     rosuvastatin (CRESTOR) 40 MG Tab; Take 1 tablet (40 mg total) by mouth every evening.  Dispense: 90 tablet; Refill: 1    Cough, unspecified type  -     montelukast (SINGULAIR) 10 mg tablet; Take 1 tablet (10 mg total) by mouth every evening.  Dispense: 90 tablet; Refill: 1  -     fluticasone propionate (FLONASE) 50 mcg/actuation nasal spray; 2 sprays (100 mcg total) by Each Nostril route once daily.  Dispense: 48 g; Refill: 2    Spondylosis of cervical joint without myelopathy  -     methocarbamoL (ROBAXIN) 500 MG Tab; Take 1 tablet (500 mg total) by mouth 2 (two) times  "daily.  Dispense: 180 tablet; Refill: 1  -     amitriptyline (ELAVIL) 25 MG tablet; Take 1 tablet (25 mg total) by mouth nightly as needed for Insomnia.  Dispense: 90 tablet; Refill: 1    Controlled type 2 diabetes mellitus without complication, without long-term current use of insulin  -     lancets (TRUEPLUS LANCETS) 33 gauge Misc; Use with meter daily  Dispense: 100 each; Refill: 5  -     blood-glucose meter Misc; Use to check blood glucose twice daily for diabetes, E11.9  Dispense: 1 each; Refill: 0    Hypertriglyceridemia  -     icosapent ethyL (VASCEPA) 0.5 gram Cap; Take 1 capsule by mouth 2 (two) times a day.  Dispense: 180 capsule; Refill: 1    Hypertension, unspecified type  -     furosemide (LASIX) 40 MG tablet; Take 1 tablet (40 mg total) by mouth once daily.  Dispense: 90 tablet; Refill: 1  -     carvediloL (COREG) 3.125 MG tablet; Take 1 tablet (3.125 mg total) by mouth 2 (two) times daily with meals.  Dispense: 180 tablet; Refill: 1    Allergic rhinitis, unspecified seasonality, unspecified trigger  -     fluticasone propionate (FLONASE) 50 mcg/actuation nasal spray; 2 sprays (100 mcg total) by Each Nostril route once daily.  Dispense: 48 g; Refill: 2    Gastroesophageal reflux disease, unspecified whether esophagitis present  -     esomeprazole (NEXIUM) 40 MG capsule; Take 1 capsule (40 mg total) by mouth once daily.  Dispense: 90 capsule; Refill: 1    Diabetes mellitus without complication  -     pen needle, diabetic 32 gauge x 1/4" Ndle; Use daily to check blood glucose  Dispense: 100 each; Refill: 2  -     Discontinue: dulaglutide (TRULICITY) 1.5 mg/0.5 mL pen injector; Inject 1.5 mg into the skin every 7 days.  Dispense: 4 pen; Refill: 5  -     dulaglutide (TRULICITY) 1.5 mg/0.5 mL pen injector; Inject 1.5 mg into the skin every 7 days.  Dispense: 12 pen; Refill: 1    Other orders  -     nitroGLYCERIN (NITROSTAT) 0.4 MG SL tablet; Place 1 tablet (0.4 mg total) under the tongue every 5 (five) " minutes as needed for Chest pain.  Dispense: 30 tablet; Refill: 2            Return to clinic in 1 month to follow-up on her blood sugars are as needed.  Patient is already scheduled for follow-up lab work before her next Cardiology visit.    Health Maintenance Topics with due status: Not Due       Topic Last Completion Date    Lipid Panel 07/21/2022    DEXA Scan 08/29/2022    Diabetes Urine Screening 10/27/2022    Hemoglobin A1c 05/02/2023

## 2023-07-03 RX ORDER — INSULIN DEGLUDEC 100 U/ML
14 INJECTION, SOLUTION SUBCUTANEOUS EVERY MORNING
Qty: 4.2 ML | Refills: 11 | Status: SHIPPED | OUTPATIENT
Start: 2023-07-03 | End: 2024-07-02

## 2023-07-14 ENCOUNTER — OFFICE VISIT (OUTPATIENT)
Dept: FAMILY MEDICINE | Facility: CLINIC | Age: 83
End: 2023-07-14
Payer: MEDICARE

## 2023-07-14 VITALS
RESPIRATION RATE: 15 BRPM | HEIGHT: 63 IN | BODY MASS INDEX: 24.09 KG/M2 | HEART RATE: 65 BPM | OXYGEN SATURATION: 96 % | DIASTOLIC BLOOD PRESSURE: 49 MMHG | TEMPERATURE: 98 F | SYSTOLIC BLOOD PRESSURE: 102 MMHG

## 2023-07-14 DIAGNOSIS — E11.69 TYPE 2 DIABETES MELLITUS WITH OTHER SPECIFIED COMPLICATION, WITHOUT LONG-TERM CURRENT USE OF INSULIN: Primary | ICD-10-CM

## 2023-07-14 DIAGNOSIS — M79.89 LEG SWELLING: ICD-10-CM

## 2023-07-14 DIAGNOSIS — R05.9 COUGH, UNSPECIFIED TYPE: ICD-10-CM

## 2023-07-14 PROCEDURE — 1159F MED LIST DOCD IN RCRD: CPT | Mod: ,,, | Performed by: NURSE PRACTITIONER

## 2023-07-14 PROCEDURE — 1160F PR REVIEW ALL MEDS BY PRESCRIBER/CLIN PHARMACIST DOCUMENTED: ICD-10-PCS | Mod: ,,, | Performed by: NURSE PRACTITIONER

## 2023-07-14 PROCEDURE — 1126F PR PAIN SEVERITY QUANTIFIED, NO PAIN PRESENT: ICD-10-PCS | Mod: ,,, | Performed by: NURSE PRACTITIONER

## 2023-07-14 PROCEDURE — 1101F PT FALLS ASSESS-DOCD LE1/YR: CPT | Mod: ,,, | Performed by: NURSE PRACTITIONER

## 2023-07-14 PROCEDURE — 1126F AMNT PAIN NOTED NONE PRSNT: CPT | Mod: ,,, | Performed by: NURSE PRACTITIONER

## 2023-07-14 PROCEDURE — 3078F PR MOST RECENT DIASTOLIC BLOOD PRESSURE < 80 MM HG: ICD-10-PCS | Mod: ,,, | Performed by: NURSE PRACTITIONER

## 2023-07-14 PROCEDURE — 3078F DIAST BP <80 MM HG: CPT | Mod: ,,, | Performed by: NURSE PRACTITIONER

## 2023-07-14 PROCEDURE — 1101F PR PT FALLS ASSESS DOC 0-1 FALLS W/OUT INJ PAST YR: ICD-10-PCS | Mod: ,,, | Performed by: NURSE PRACTITIONER

## 2023-07-14 PROCEDURE — 3074F SYST BP LT 130 MM HG: CPT | Mod: ,,, | Performed by: NURSE PRACTITIONER

## 2023-07-14 PROCEDURE — 99213 PR OFFICE/OUTPT VISIT, EST, LEVL III, 20-29 MIN: ICD-10-PCS | Mod: ,,, | Performed by: NURSE PRACTITIONER

## 2023-07-14 PROCEDURE — 1159F PR MEDICATION LIST DOCUMENTED IN MEDICAL RECORD: ICD-10-PCS | Mod: ,,, | Performed by: NURSE PRACTITIONER

## 2023-07-14 PROCEDURE — 3288F FALL RISK ASSESSMENT DOCD: CPT | Mod: ,,, | Performed by: NURSE PRACTITIONER

## 2023-07-14 PROCEDURE — 3288F PR FALLS RISK ASSESSMENT DOCUMENTED: ICD-10-PCS | Mod: ,,, | Performed by: NURSE PRACTITIONER

## 2023-07-14 PROCEDURE — 1160F RVW MEDS BY RX/DR IN RCRD: CPT | Mod: ,,, | Performed by: NURSE PRACTITIONER

## 2023-07-14 PROCEDURE — 3074F PR MOST RECENT SYSTOLIC BLOOD PRESSURE < 130 MM HG: ICD-10-PCS | Mod: ,,, | Performed by: NURSE PRACTITIONER

## 2023-07-14 PROCEDURE — 99213 OFFICE O/P EST LOW 20 MIN: CPT | Mod: ,,, | Performed by: NURSE PRACTITIONER

## 2023-07-14 RX ORDER — ISOSORBIDE MONONITRATE 30 MG/1
30 TABLET, EXTENDED RELEASE ORAL
COMMUNITY
Start: 2023-06-22

## 2023-07-14 RX ORDER — ALBUTEROL SULFATE 90 UG/1
1 AEROSOL, METERED RESPIRATORY (INHALATION) EVERY 6 HOURS PRN
Qty: 8 G | Refills: 0 | Status: SHIPPED | OUTPATIENT
Start: 2023-07-14

## 2023-07-14 RX ORDER — PRASUGREL 10 MG/1
10 TABLET, FILM COATED ORAL
COMMUNITY
Start: 2023-06-22

## 2023-07-14 NOTE — PROGRESS NOTES
[facility]    Chief Complaint      Chief Complaint   Patient presents with    Follow-up     X1 month, non fasting    Leg Swelling     X6 days , bilateral, from the knee to her toes,       History of Present Illness      Sharee Elizondo is a 83 y.o. female. She  has a past medical history of Acute superficial gastritis without hemorrhage (2/18/2022), Atrial fibrillation, Automatic implantable cardiac defibrillator in situ, Bilateral primary osteoarthritis of knee, Cervical spondylosis without myelopathy, CHF (congestive heart failure), Chronic ischemic heart disease, Chronic kidney disease (CKD), stage III (moderate), Chronic pain syndrome, COPD (chronic obstructive pulmonary disease), Coronary arteriosclerosis, Depressive disorder, Esophageal dysphagia (2/18/2022), GERD (gastroesophageal reflux disease), HH (hiatus hernia) (2/18/2022), Hypothyroidism, Low back pain, Lumbar radiculopathy, Melanoma (2019), Metabolic syndrome, Myocardial ischemia, Other long term (current) drug therapy, Radiculopathy, cervical region, and Type 2 diabetes mellitus., who presents today for 1 mo f/u.  Her daughter has accompanied her and expressed some frustration to staff that patient was not seeing Dr. Rice today due to he is out on vacation. Patient reports no side effects from medication increase of her Trulicity.  States her CBG has been stable and doing well- does not have a log with her but states her CBG was 144 this morning.     Reports she needs a refill of her Albuterol- uses it at night due to awakenings     Past Medical History:  Past Medical History:   Diagnosis Date    Acute superficial gastritis without hemorrhage 2/18/2022    Atrial fibrillation     Automatic implantable cardiac defibrillator in situ     Bilateral primary osteoarthritis of knee     Cervical spondylosis without myelopathy     CHF (congestive heart failure)     Chronic ischemic heart disease     Chronic kidney disease (CKD), stage III (moderate)      Chronic pain syndrome     COPD (chronic obstructive pulmonary disease)     Coronary arteriosclerosis     Depressive disorder     Esophageal dysphagia 2/18/2022    GERD (gastroesophageal reflux disease)     HH (hiatus hernia) 2/18/2022    Hypothyroidism     Low back pain     Lumbar radiculopathy     Melanoma 2019    scalp    Metabolic syndrome     Myocardial ischemia     Other long term (current) drug therapy     Radiculopathy, cervical region     Type 2 diabetes mellitus        Past Surgical History:   has a past surgical history that includes Radiofrequency ablation (Right, 12/12/2018); Radiofrequency ablation (Left, 01/9/2019 10/16/2017 10/7/2020); Transforaminal epidural injection of steroid (Right, 5/30/2018 5/2/2018); Epidural steroid injection into cervical spine (6/19/2019 5/17/2017 4/26/2017); bilateral knee injection/ aspiration of joint/ bursa-large  (Bilateral, 4/4/2017 3/27/2017 3/20/2017); Injection of facet joint (Bilateral, 8/17/2017 7/19/2017); Injection of facet joint (Left, 10/18/2018); Injection of facet joint (Bilateral, 8/22/2018 6/29/2018); Epidural steroid injection into lumbar spine (03/15/2018); Radiofrequency ablation (Left, 1/9/2019 10/16/2017); Radiofrequency thermal coagulation of medial branch of posterior ramus of cervical spinal nerve (Left, 08/05/2021); Radiofrequency thermal coagulation of medial branch of posterior ramus of cervical spinal nerve (Right, 08/24/2021); Cardiac surgery; Hysterectomy; Back surgery; Injection of anesthetic agent around medial branch nerves innervating lumbar facet joint (Bilateral, 04/14/2022); and Cardiac catheterization.    Social History:  Social History     Tobacco Use    Smoking status: Every Day     Packs/day: 0.50     Years: 40.00     Pack years: 20.00     Types: Cigarettes     Passive exposure: Current    Smokeless tobacco: Never   Substance Use Topics    Alcohol use: Not Currently    Drug use: Never       I personally reviewed all past  medical, surgical, and social.     Review of Systems   Constitutional:  Negative for fatigue.   Eyes:  Negative for visual disturbance.   Respiratory:  Negative for cough and shortness of breath.    Cardiovascular:  Positive for leg swelling. Negative for chest pain and palpitations.   Gastrointestinal:  Negative for abdominal pain, constipation and diarrhea.   Genitourinary:  Negative for difficulty urinating and dysuria.   Musculoskeletal:  Negative for gait problem.   Skin: Negative.    Neurological:  Negative for dizziness, weakness, light-headedness and headaches.      Medications:  Outpatient Encounter Medications as of 7/14/2023   Medication Sig Note Dispense Refill    amitriptyline (ELAVIL) 25 MG tablet Take 1 tablet (25 mg total) by mouth nightly as needed for Insomnia.  90 tablet 1    ascorbic acid, vitamin C, (VITAMIN C) 500 MG tablet Take 500 mg by mouth once daily.       aspirin 81 mg Cap Take 81 mg by mouth Daily.       blood sugar diagnostic (TRUE METRIX GLUCOSE TEST STRIP) Strp TEST BLOOD SUGAR TWICE DAILY FOR DIABETES  200 strip 5    blood-glucose meter Misc Use to check blood glucose twice daily for diabetes, E11.9  1 each 0    carvediloL (COREG) 3.125 MG tablet Take 1 tablet (3.125 mg total) by mouth 2 (two) times daily with meals.  180 tablet 1    cholecalciferol, vitamin D3, (VITAMIN D3) 25 mcg (1,000 unit) capsule Take 2,000 Units by mouth 2 (two) times daily.       digoxin (LANOXIN) 125 mcg tablet Take 125 mcg by mouth once daily.       dulaglutide (TRULICITY) 1.5 mg/0.5 mL pen injector Inject 1.5 mg into the skin every 7 days. 6/14/2023: Pt takes 1.75 mg   12 pen 1    esomeprazole (NEXIUM) 40 MG capsule Take 1 capsule (40 mg total) by mouth once daily.  90 capsule 1    flash glucose scanning reader (FREESTYLE DESI 2 READER) [facility] [place], [place] Use for checking blood sugar as needed.  1 each 5    flash glucose sensor (FREESTYLE DESI 2 SENSOR) Kit Use to check blood sugar daily as needed for 2 weeks  3 kit  "5    fluticasone propionate (FLONASE) 50 mcg/actuation nasal spray 2 sprays (100 mcg total) by Each Nostril route once daily.  48 g 2    furosemide (LASIX) 40 MG tablet Take 1 tablet (40 mg total) by mouth once daily.  90 tablet 1    gabapentin (NEURONTIN) 100 MG capsule Take 1 capsule (100 mg total) by mouth every 8 (eight) hours.  270 capsule 2    HYDROcodone-acetaminophen (NORCO) 7.5-325 mg per tablet Take 1 tablet by mouth every 8 (eight) hours as needed for Pain.  90 tablet 0    icosapent ethyL (VASCEPA) 0.5 gram Cap Take 1 capsule by mouth 2 (two) times a day.  180 capsule 1    insulin degludec (TRESIBA FLEXTOUCH U-100) 100 unit/mL (3 mL) insulin pen Inject 14 Units into the skin every morning.  4.2 mL 11    isosorbide mononitrate (IMDUR) 30 MG 24 hr tablet Take 30 mg by mouth.       lancets (TRUEPLUS LANCETS) 33 gauge Misc Use with meter daily  100 each 5    levothyroxine (SYNTHROID) 88 MCG tablet Take 88 mcg by mouth before breakfast.       methocarbamoL (ROBAXIN) 500 MG Tab Take 1 tablet (500 mg total) by mouth 2 (two) times daily.  180 tablet 1    montelukast (SINGULAIR) 10 mg tablet Take 1 tablet (10 mg total) by mouth every evening.  90 tablet 1    nitroGLYCERIN (NITROSTAT) 0.4 MG SL tablet Place 1 tablet (0.4 mg total) under the tongue every 5 (five) minutes as needed for Chest pain.  30 tablet 2    pen needle, diabetic 32 gauge x 1/4" Ndle Use daily to check blood glucose  100 each 2    prasugreL (EFFIENT) 10 mg Tab Take 10 mg by mouth.       rosuvastatin (CRESTOR) 40 MG Tab Take 1 tablet (40 mg total) by mouth every evening.  90 tablet 1    sertraline (ZOLOFT) 50 MG tablet Take 1 tablet (50 mg total) by mouth once daily.  90 tablet 1    [DISCONTINUED] albuterol (PROVENTIL/VENTOLIN HFA) 90 mcg/actuation inhaler Twice Daily       albuterol (PROVENTIL/VENTOLIN HFA) 90 mcg/actuation inhaler Inhale 1 puff into the lungs every 6 (six) hours as needed for Wheezing. Rescue  8 g 0    HYDROcodone-acetaminophen " "(NORCO) 7.5-325 mg per tablet Take 1 tablet by mouth every 8 (eight) hours as needed for Pain.  90 tablet 0    [START ON 7/29/2023] HYDROcodone-acetaminophen (NORCO) 7.5-325 mg per tablet Take 1 tablet by mouth every 8 (eight) hours as needed for Pain.  90 tablet 0    [DISCONTINUED] insulin degludec (TRESIBA FLEXTOUCH U-100 SUBQ) Inject 14 Units into the skin every morning.        No facility-administered encounter medications on file as of 7/14/2023.       Allergies:  Review of patient's allergies indicates:  No Known Allergies    Health Maintenance:  Immunization History   Administered Date(s) Administered    COVID-19, MRNA, LN-S, PF (MODERNA FULL 0.5 ML DOSE) 03/10/2021, 04/07/2021    Influenza (FLUAD) - Quadrivalent - Adjuvanted - PF *Preferred* (65+) 10/27/2022    Influenza - Quadrivalent - High Dose - PF (65 years and older) 10/06/2021    Pneumococcal Conjugate - 20 Valent 01/18/2023      Health Maintenance   Topic Date Due    Eye Exam  Never done    Lipid Panel  07/21/2023    TETANUS VACCINE  09/01/2023 (Originally 1/21/1958)    Hemoglobin A1c  11/02/2023    Aspirin/Antiplatelet Therapy  07/14/2024    DEXA Scan  08/29/2025        Physical Exam      Vital Signs  Temp: 97.9 °F (36.6 °C)  Temp Source: Oral  Pulse: 65  Resp: 15  SpO2: 96 %  BP: (!) 102/49  BP Location: Right arm  Patient Position: Sitting  Pain Score: 0-No pain  Height and Weight  Height: 5' 3" (160 cm)  Weight in (lb) to have BMI = 25: 140.8]    Physical Exam  Vitals and nursing note reviewed.   Constitutional:       Appearance: Normal appearance. She is well-developed.   HENT:      Head: Normocephalic.      Right Ear: Hearing normal.      Left Ear: Hearing normal.      Nose: Nose normal.   Eyes:      General: Lids are normal.      Extraocular Movements: Extraocular movements intact.      Conjunctiva/sclera: Conjunctivae normal.      Pupils: Pupils are equal, round, and reactive to light.   Cardiovascular:      Rate and Rhythm: Normal rate and " regular rhythm.      Heart sounds: Normal heart sounds.   Pulmonary:      Effort: Pulmonary effort is normal.      Breath sounds: Normal breath sounds.   Musculoskeletal:         General: Normal range of motion.      Cervical back: Normal range of motion and neck supple.      Right lower leg: No edema.      Left lower leg: No edema.   Skin:     General: Skin is warm and dry.   Neurological:      Mental Status: She is alert and oriented to person, place, and time.      Gait: Gait is intact.   Psychiatric:         Behavior: Behavior is cooperative.        Laboratory:  CBC:  Recent Labs   Lab 07/21/22  1104 08/30/22  0857 01/18/23  0848   WBC 8.56 9.79 8.22   RBC 5.02 3.87 L 3.74 L   Hemoglobin 15.7 11.9 L 11.5 L   Hematocrit 45.9 36.2 L 35.5 L   Platelet Count 243 219 260   MCV 91.4 93.5 94.9   MCH 31.3 H 30.7 30.7   MCHC 34.2 32.9 32.4     CMP:  Recent Labs   Lab 02/03/22  1032 07/21/22  1104 01/18/23  0848   Glucose 106 105 152 H   Calcium 9.5 9.7 9.5   Albumin 3.7 4.0 3.8   Total Protein 7.5 7.4 6.7   Sodium 133 L 137 138   Potassium 3.9 3.9 3.3 L   CO2 24 26 28   Chloride 101 101 100   BUN 19 H 20 H 17   Alk Phos 101 100 89   ALT 15 15 13   AST 10 L 11 L 11 L   Bilirubin, Total 0.4 0.4 0.4     LIPIDS:  Recent Labs   Lab 08/03/21  1016 02/03/22  1032 07/21/22  1104 08/23/22  0929 01/18/23  0848   TSH 3.130 4.950 H 13.400 H 5.360 H 0.552   HDL Cholesterol 36 L 35 L 39 L  --   --    Cholesterol 168 294 H 136  --   --    Triglycerides 291 H 296 H 223 H  --   --    LDL Calculated 74 200 52  --   --    Cholesterol/HDL Ratio (Risk Factor) 4.7 8.4 3.5  --   --    Non- 259 97  --   --      TSH:  Recent Labs   Lab 07/21/22  1104 08/23/22  0929 01/18/23  0848   TSH 13.400 H 5.360 H 0.552     A1C:  Recent Labs   Lab 08/11/20  1415 03/15/21  1554 08/03/21  1016 02/03/22  1032 07/21/22  1104 01/18/23  0848 05/02/23  0910   Hemoglobin A1C 8.7 8.1 7.8 H 6.4 6.5 6.4 7.0 H       Assessment/Plan     Sharee Elizondo is a 83  y.o.female with:     1. Cough, unspecified type  - albuterol (PROVENTIL/VENTOLIN HFA) 90 mcg/actuation inhaler; Inhale 1 puff into the lungs every 6 (six) hours as needed for Wheezing. Rescue  Dispense: 8 g; Refill: 0    2. Type 2 diabetes mellitus with other specified complication, without long-term current use of insulin    3. Leg swelling     Patient c/o leg swelling but none noted on today's exam      Total time spent face-to-face and non-face-to-face coordinating care for this encounter was: 20 minutes    Chronic conditions status updated as per HPI.  Other than changes above, cont current medications and maintain follow up with specialists.  Return to clinic in 2 month(s) with Dr. Rice.    JOSELITO De Leon  [facility]

## 2023-07-26 RX ORDER — GABAPENTIN 100 MG/1
100 CAPSULE ORAL EVERY 8 HOURS
Qty: 270 CAPSULE | Refills: 2 | Status: CANCELLED | OUTPATIENT
Start: 2023-07-26

## 2023-07-26 RX ORDER — HYDROCODONE BITARTRATE AND ACETAMINOPHEN 7.5; 325 MG/1; MG/1
1 TABLET ORAL EVERY 8 HOURS PRN
Qty: 90 TABLET | Refills: 0 | Status: CANCELLED | OUTPATIENT
Start: 2023-07-26

## 2023-07-26 RX ORDER — HYDROCODONE BITARTRATE AND ACETAMINOPHEN 7.5; 325 MG/1; MG/1
1 TABLET ORAL EVERY 8 HOURS PRN
Qty: 90 TABLET | Refills: 0 | Status: CANCELLED | OUTPATIENT
Start: 2023-07-29

## 2023-07-26 NOTE — PROGRESS NOTES
Subjective:         Patient ID: Sharee Elizondo is a 83 y.o. female.    Chief Complaint: Neck Pain        Pain  This is a chronic problem. The current episode started more than 1 year ago. The problem occurs daily. The problem has been waxing and waning. Associated symptoms include arthralgias and neck pain. Pertinent negatives include no anorexia, chest pain, chills, coughing, diaphoresis, fever, sore throat, urinary symptoms, vertigo or vomiting.   Review of Systems   Constitutional:  Negative for activity change, appetite change, chills, diaphoresis, fever and unexpected weight change.   HENT:  Negative for drooling, ear discharge, ear pain, facial swelling, mouth dryness, nosebleeds, sore throat, trouble swallowing, voice change and goiter.    Eyes:  Negative for photophobia, pain, discharge, redness and visual disturbance.   Respiratory:  Negative for apnea, cough, choking, chest tightness, shortness of breath, wheezing and stridor.    Cardiovascular:  Negative for chest pain, palpitations and leg swelling.   Gastrointestinal:  Negative for abdominal distention, anorexia, diarrhea, rectal pain, vomiting and fecal incontinence.   Endocrine: Negative for cold intolerance, heat intolerance, polydipsia, polyphagia and polyuria.   Genitourinary:  Negative for bladder incontinence, dysuria, flank pain, frequency and hot flashes.   Musculoskeletal:  Positive for arthralgias, back pain, leg pain and neck pain.   Integumentary:  Negative for color change and pallor.   Allergic/Immunologic: Negative for immunocompromised state.   Neurological:  Negative for dizziness, vertigo, seizures, syncope, facial asymmetry, speech difficulty, light-headedness, coordination difficulties, memory loss and coordination difficulties.   Hematological:  Negative for adenopathy. Does not bruise/bleed easily.   Psychiatric/Behavioral:  Negative for agitation, behavioral problems, confusion, decreased concentration, dysphoric mood,  hallucinations, self-injury and suicidal ideas. The patient is not nervous/anxious and is not hyperactive.          Past Medical History:   Diagnosis Date    Acute superficial gastritis without hemorrhage 2/18/2022    Atrial fibrillation     Automatic implantable cardiac defibrillator in situ     Bilateral primary osteoarthritis of knee     Cervical spondylosis without myelopathy     CHF (congestive heart failure)     Chronic ischemic heart disease     Chronic kidney disease (CKD), stage III (moderate)     Chronic pain syndrome     COPD (chronic obstructive pulmonary disease)     Coronary arteriosclerosis     Depressive disorder     Esophageal dysphagia 2/18/2022    GERD (gastroesophageal reflux disease)     HH (hiatus hernia) 2/18/2022    Hypothyroidism     Low back pain     Lumbar radiculopathy     Melanoma 2019    scalp    Metabolic syndrome     Myocardial ischemia     Other long term (current) drug therapy     Radiculopathy, cervical region     Type 2 diabetes mellitus      Past Surgical History:   Procedure Laterality Date    BACK SURGERY      bilateral knee injection/ aspiration of joint/ bursa-large  Bilateral 4/4/2017 3/27/2017 3/20/2017    bilateral knee orthovisc injection    CARDIAC CATHETERIZATION      CARDIAC SURGERY      EPIDURAL STEROID INJECTION INTO CERVICAL SPINE  6/19/2019 5/17/2017 4/26/2017    C6-7 LONNIEDr Farah    EPIDURAL STEROID INJECTION INTO LUMBAR SPINE  03/15/2018    L4-5 Dr Gagan FLEMING    HYSTERECTOMY      INJECTION OF ANESTHETIC AGENT AROUND MEDIAL BRANCH NERVES INNERVATING LUMBAR FACET JOINT Bilateral 04/14/2022    Procedure: Block-nerve-medial branch-lumbar, bilateral L4 through S1;  Surgeon: Angie Farah MD;  Location: [facility];  Service: Pain Management;  Laterality: Bilateral;  pt aware at visit to be tested  4/12 patient getting covid test today    INJECTION OF FACET JOINT Bilateral 8/17/2017 7/19/2017    Bilateral C3-7 FIDr Farah  "   INJECTION OF FACET JOINT Left 10/18/2018    left C3-7 FI, Dr Farah    INJECTION OF FACET JOINT Bilateral 8/22/2018 6/29/2018    bilateral L3-S1 FI, Dr Farah    RADIOFREQUENCY ABLATION Right 12/12/2018    Right C3-7 RF, Dr Farah    RADIOFREQUENCY ABLATION Left 01/9/2019 10/16/2017 10/7/2020    Left C3-7 RF, Dr Farah    RADIOFREQUENCY ABLATION Left 1/9/2019 10/16/2017    left C3-7 RF, Dr Farah    RADIOFREQUENCY THERMAL COAGULATION OF MEDIAL BRANCH OF POSTERIOR RAMUS OF CERVICAL SPINAL NERVE Left 08/05/2021    Procedure: RADIOFREQUENCY THERMAL COAGULATION, NERVE, SPINAL, CERVICAL, POSTERIOR RAMUS, MEDIAL BRANCH, Left C3-4,4-5  only 2 levels resubmitted dt denied all levels on 8-3-2021;  Surgeon: Angie Farah MD;  Location: [facility];  Service: Pain Management;  Laterality: Left;    RADIOFREQUENCY THERMAL COAGULATION OF MEDIAL BRANCH OF POSTERIOR RAMUS OF CERVICAL SPINAL NERVE Right 08/24/2021    Procedure: RADIOFREQUENCY THERMAL COAGULATION, NERVE, SPINAL, CERVICAL, POSTERIOR RAMUS, MEDIAL BRANCH RIGHT C3-C5 RFTC;  Surgeon: Angie Farah MD;  Location: [facility];  Service: Pain Management;  Laterality: Right;  HAD VAC WILL BRING CARD    TRANSFORAMINAL EPIDURAL INJECTION OF STEROID Right 5/30/2018 5/2/2018    Right L4-5 TFESI, Dr Farah     Social History     Socioeconomic History    Marital status:    Tobacco Use    Smoking status: Every Day     Packs/day: 0.50     Years: 40.00     Pack years: 20.00     Types: Cigarettes     Passive exposure: Current    Smokeless tobacco: Never   Substance and Sexual Activity    Alcohol use: Not Currently    Drug use: Never     Family History   Problem Relation Age of Onset    Hypertension Mother     Diabetes Mellitus Mother      Review of patient's allergies indicates:  No Known Allergies     Objective:  Vitals:    07/28/23 0750   BP: (!) 137/50   Pulse: 75   Resp: 20   Weight: 58.5 kg (129 lb)   Height: 5' 3" (1.6 m)   PainSc:   " 7           Physical Exam  Vitals and nursing note reviewed. Exam conducted with a chaperone present.   Constitutional:       General: She is awake. She is not in acute distress.     Appearance: Normal appearance. She is not ill-appearing or toxic-appearing.   HENT:      Head: Normocephalic and atraumatic.      Nose: Nose normal.      Mouth/Throat:      Mouth: Mucous membranes are moist.      Pharynx: Oropharynx is clear.   Eyes:      Conjunctiva/sclera: Conjunctivae normal.      Pupils: Pupils are equal, round, and reactive to light.   Pulmonary:      Effort: Pulmonary effort is normal. No respiratory distress.   Abdominal:      Palpations: Abdomen is soft.      Tenderness: There is no guarding.   Musculoskeletal:         General: Normal range of motion.      Cervical back: Normal range of motion and neck supple. Tenderness present. No rigidity.      Thoracic back: Tenderness present.      Lumbar back: Tenderness present.   Skin:     General: Skin is warm and dry.      Coloration: Skin is not jaundiced or pale.   Neurological:      General: No focal deficit present.      Mental Status: She is alert and oriented to person, place, and time. Mental status is at baseline.      Cranial Nerves: No cranial nerve deficit (II-XII).      Gait: Gait abnormal.   Psychiatric:         Mood and Affect: Mood normal.         Behavior: Behavior normal. Behavior is cooperative.         Thought Content: Thought content normal.         CT Cervical Spine Without Contrast  Narrative: EXAMINATION:  CT CERVICAL SPINE WITHOUT CONTRAST    CLINICAL HISTORY:  CervicalgiaNeck pain, chronic;    COMPARISON:  Cervical spine CT June 6, 2019    TECHNIQUE:  Multiple axial tomographic images of the cervical spine were obtained without the use of intravenous contrast. Coronal and sagittal reformatted images provided.    FINDINGS:  Chronic appearing mild-to-moderate anterior wedging of the C5 vertebral body.  Moderate loss of disc space height at C4-5  and C6-7.  Moderate to severe loss of disc space height at C5-6.  Multilevel mild marginal vertebral body osteophyte formation.  Scattered posterior facet and uncovertebral joint hypertrophy.  There is severe left-sided neural foraminal narrowing noted at the C5-6 level.  Mild-to-moderate left-sided neural foraminal narrowing noted at the C4-5 level.  Mild-to-moderate bilateral neural foraminal narrowing noted at the C6-7 level.  Small posterior disc osteophyte formation noted at several levels with mild spinal canal narrowing.  This is greatest at the C5-6 level. There is straightening of normal cervical lordosis which may be positional or secondary to muscle spasm. There is no significant anterolisthesis or retrolisthesis.  Prior sternotomy.  Partially visualized cardiac conduction device within the left chest.  Impression: Degenerative change and straightening of the cervical spine as detailed above.  There is no high-grade spinal canal narrowing.  Neural foraminal narrowing is greatest on the left at C5-6 where disc considered severe.  Chronic appearing mild-to-moderate anterior wedging of C5 vertebral body.  Other/detailed findings as above.    The CT exam was performed using one or more of the following dose    reduction techniques- Automated exposure control, adjustment of the mA    and/or kV according to patient size, and/or use of iterative    reconstructed technique.    Point of Service: [facility]    Electronically signed by: Tao Dexter  Date:    04/17/2023  Time:    14:01  EGD w Dilation  Narrative: Table formatting from the original result was not included.  Procedure Date  4/17/23    Impression  Overall   Impression:  Mucosa of the esophagus is grossly normal. The distal   esophagus was biopsied and the esophagus was dilated with a 48F Bejarano   dilator. Mucosa of the stomach had gastritis without ulcers and biopsies   were obtained. Mucosa of the duodenum was  normal.    Recommendation   Await pathology results     Continue PPI or H2RA therapy, avoid nsaids; repeat  esophageal dilation   prn  Discharge:  Disp: DC to home in stable condition. Resume diet. No driving x 24 hours.   F/U with PCP as scheduled.  Dx: esophageal dysphagia, gastritis    Indication  Dysphagia, unspecified type    Providers  Kelsey Pereira Technician   JAE De Leon CRNA, RN Registered Nurse   Michael Long MD Proceduralist     Medications  Moderate sedation administered by anesthesia staff - See anesthesia   record.    Preprocedure  A history and physical has been performed, and patient medication   allergies have been reviewed. The patient's tolerance of previous   anesthesia has been reviewed. The risks and benefits of the procedure and   the sedation options and risks were discussed with the patient. All   questions were answered and informed consent obtained.    ASA Score: ASA 2 - Patient with mild systemic disease with no functional   limitations  Mallampati Airway Score: II (hard and soft palate, upper portion of   tonsils anduvula visible)    Details of the Procedure  The patient underwent monitored anesthesia care, which was administered by   an anesthesia professional. The patient's blood pressure, heart rate,   level of consciousness, oxygen, respirations, ECG and ETCO2 were monitored   throughout the procedure. The scope was introduced through the mouth and   advanced to the third part of the duodenum. Retroflexion was performed in   the cardia. Prior to the procedure, the patient's H. Pylori status was   unknown. The patient's estimated blood loss was minimal (<5 mL). The   procedure was not difficult. The patient tolerated the procedure well.   There were no apparent complications.     Scope: Gastroscope  Scope Serial: [id number]    Events  Procedure Events   Event Event Time     Procedure Events   Event Event Time   SCOPE IN 4/17/2023 10:18 AM   SCOPE  OUT 4/17/2023 10:22 AM     Findings  Performed multiple forceps biopsies in the esophagus and stomach  Erythematous mucosa in the fundus of the stomach and antrum; performed   cold forceps biopsy  Dilated in the esophagus with Bejarano dilator         Office Visit on 05/25/2023   Component Date Value Ref Range Status    POC Amphetamines 05/25/2023 Negative  Negative, Inconclusive Final    POC Barbiturates 05/25/2023 Negative  Negative, Inconclusive Final    POC Benzodiazepines 05/25/2023 Negative  Negative, Inconclusive Final    POC Cocaine 05/25/2023 Negative  Negative, Inconclusive Final    POC THC 05/25/2023 Negative  Negative, Inconclusive Final    POC Methadone 05/25/2023 Negative  Negative, Inconclusive Final    POC Methamphetamine 05/25/2023 Negative  Negative, Inconclusive Final    POC Opiates 05/25/2023 Presumptive Positive (A)  Negative, Inconclusive Final    POC Oxycodone 05/25/2023 Negative  Negative, Inconclusive Final    POC Phencyclidine 05/25/2023 Negative  Negative, Inconclusive Final    POC Methylenedioxymethamphetamine * 05/25/2023 Negative  Negative, Inconclusive Final    POC Tricyclic Antidepressants 05/25/2023 Negative  Negative, Inconclusive Final    POC Buprenorphine 05/25/2023 Negative   Final     Acceptable 05/25/2023 Yes   Final    POC Temperature (Urine) 05/25/2023 92   Final   Office Visit on 05/01/2023   Component Date Value Ref Range Status    Hemoglobin A1C 05/02/2023 7.0 (H)  4.5 - 6.6 % Final    Estimated Average Glucose 05/02/2023 147  mg/dL Final   Hospital Outpatient Visit on 04/17/2023   Component Date Value Ref Range Status    Case Report 04/17/2023    Final                    Value:Surgical Pathology                                Case: [id number]                                   Authorizing Provider:  Michael Long MD      Collected:           04/17/2023 10:20 AM          Ordering Location:     [facility] -         Received:             04/17/2023 12:32 PM                                 Endoscopy                                                                    Pathologist:           Rene Alegre MD                                                            Specimens:   A) - Stomach, a. stomach bx                                                                         B) - Esophagus, b. esophagus bx                                                            Final Diagnosis 04/17/2023    Final                    Value:This result contains rich text formatting which cannot be displayed here.    Gross Description 04/17/2023    Final                    Value:This result contains rich text formatting which cannot be displayed here.    Microscopic Description 04/17/2023    Final                    Value:This result contains rich text formatting which cannot be displayed here.    Laboratory Notes 04/17/2023    Final                    Value:This result contains rich text formatting which cannot be displayed here.   Office Visit on 02/22/2023   Component Date Value Ref Range Status    POC Amphetamines 02/22/2023 Negative  Negative, Inconclusive Final    POC Barbiturates 02/22/2023 Negative  Negative, Inconclusive Final    POC Benzodiazepines 02/22/2023 Negative  Negative, Inconclusive Final    POC Cocaine 02/22/2023 Negative  Negative, Inconclusive Final    POC THC 02/22/2023 Negative  Negative, Inconclusive Final    POC Methadone 02/22/2023 Negative  Negative, Inconclusive Final    POC Methamphetamine 02/22/2023 Negative  Negative, Inconclusive Final    POC Opiates 02/22/2023 Presumptive Positive (A)  Negative, Inconclusive Final    POC Oxycodone 02/22/2023 Negative  Negative, Inconclusive Final    POC Phencyclidine 02/22/2023 Negative  Negative, Inconclusive Final    POC Methylenedioxymethamphetamine * 02/22/2023 Negative  Negative, Inconclusive Final    POC Tricyclic Antidepressants 02/22/2023 Negative  Negative, Inconclusive Final     POC Buprenorphine 02/22/2023 Negative   Final     Acceptable 02/22/2023 Yes   Final    POC Temperature (Urine) 02/22/2023 90   Final         No orders of the defined types were placed in this encounter.        Requested Prescriptions     Pending Prescriptions Disp Refills    gabapentin (NEURONTIN) 100 MG capsule 270 capsule 2     Sig: Take 1 capsule (100 mg total) by mouth every 8 (eight) hours.    HYDROcodone-acetaminophen (NORCO) 7.5-325 mg per tablet 90 tablet 0     Sig: Take 1 tablet by mouth every 8 (eight) hours as needed for Pain.    HYDROcodone-acetaminophen (NORCO) 7.5-325 mg per tablet 90 tablet 0     Sig: Take 1 tablet by mouth every 8 (eight) hours as needed for Pain.    HYDROcodone-acetaminophen (NORCO) 7.5-325 mg per tablet 90 tablet 0     Sig: Take 1 tablet by mouth every 8 (eight) hours as needed for Pain.       Assessment:     1. 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 Osteoarthrosis multiple sites, not specified as generalized    3. Chronic pain of right knee    4. Lumbar radiculopathy    5. Thoracic radiculopathy           A's of Opioid Risk Assessment  Activity:Patient can perform ADL.   Analgesia:Patients pain is partially controlled by current medication. Patient has tried OTC medications such as Tylenol and Ibuprofen with out relief.   Adverse Effects: Patient denies constipation or sedation.  Aberrant Behavior:  reviewed with no aberrant drug seeking/taking behavior.  Overdose reversal drug naloxone discussed    Drug screen reviewed    X-ray bilateral knee [facility] September 4, 2020 degenerative changes noted no fracture noted    CT thoracic spine degenerative changes throughout the thoracic spine radiologist did find a noncalcified 5-6 mm lesion left lower lung area recommended follow-up with CT chest 6 months   Primary care provider following this      Plan:    Narcan November 2022    Pharmacy closed on weekends     She can pick her August  prescription August 5     Will use August 6 as refill date next refill      Cardiac defibrillator cannot have MRI    Anticoagulated after cardiac stent placement    Complaint cervical spine discomfort worse with flexion extension rotation cervical spine ongoing for more than 3 months pain is facet joint in nature    Requesting Toradol injection    Toradol 30 mg IM, tolerated well     Requesting cervical spine procedure    She had cervical RF TC C3 through 6 August 24, 2021, she had 80% relief after procedure, the procedure did help improve her level function, patient experienced grade 3 months pain relief after procedure    CT cervical spine [facility] April 3, 2023, multiple level degenerative changes no high-grade spinal canal narrowing neuroforaminal narrowing greatest left C5/6 severe mild-to-moderate anterior wedging C5 vertebral body    History lumbar surgery 2005, Dr. Woods [facility]    Continue home exercise program as directed    Indications for this procedure for this specific patient include the following   - Pt has had symptoms for three months with moderate to severe pain with functional impairment rated of 7/10 pain.   - Pain non-responsive to conservative care.    - Pain predominately axial and not associated with radiculopathy or claudication.    - No non-facet pathology as source of pain.    - Clinical assessment implicates facet joint as putative pain source.    - facet loading maneuver positive  - Pain is exacerbated by extension or prolonged sitting/standing and relieved by rest.    - No unexplained neurologic deficit.    - No history of coagulopathy, infection or unstable medical conditions.    - Pain is causing significant functional limitation resulting in diminished quality of life and impaired age appropriate ADL's.   - Clinical assessment implicates facet joint as putative source of pain  - Repeat injections not done prior to 7 days   - no more than 2 levels will be done    The  planned medically necessary  surgical procedure is performed in a hospital outpatient department and not in an ambulatory surgical center due to:     -there is no geographically assessable ambulatory surgery center that has the  necessary equipment and fluoroscopy needed for the procedure     -there is no geographically assessable ambulatory surgical center available at which the physician has privileges     -an ASC's  specific  guideline regarding the individuals weight or health conditions that prevent the use of an ASC     -Medial branch block performed in consideration for RFTC, not just for therapeutic treatment    -done under fluoro    Monitor anesthesia request is medically indicated for the scheduled nerve block procedure due to:  1- needle phobia and anxiety, placing  the patient at risk during the provided service.  2-patient has an ASA class greater than 3 and requires constant presence of an anesthesiologist during the procedure,   3-patient has severe problems hard to lie still  4-patient suffers from chronic pain and is unable to function due to  diminished ADLs    Schedule cervical RF TC bilateral C4 through 6 cervical spondylosis    Dr. Farah    Bring original prescription medication bottles/container/box with labels to each visit

## 2023-07-28 ENCOUNTER — OFFICE VISIT (OUTPATIENT)
Dept: PAIN MEDICINE | Facility: CLINIC | Age: 83
End: 2023-07-28
Payer: MEDICARE

## 2023-07-28 VITALS
BODY MASS INDEX: 22.86 KG/M2 | SYSTOLIC BLOOD PRESSURE: 137 MMHG | HEART RATE: 75 BPM | HEIGHT: 63 IN | WEIGHT: 129 LBS | DIASTOLIC BLOOD PRESSURE: 50 MMHG | RESPIRATION RATE: 20 BRPM

## 2023-07-28 DIAGNOSIS — M89.49 OSTEOARTHROSIS MULTIPLE SITES, NOT SPECIFIED AS GENERALIZED: Chronic | ICD-10-CM

## 2023-07-28 DIAGNOSIS — G89.29 CHRONIC PAIN OF RIGHT KNEE: ICD-10-CM

## 2023-07-28 DIAGNOSIS — M47.812 SPONDYLOSIS OF CERVICAL JOINT WITHOUT MYELOPATHY: Primary | Chronic | ICD-10-CM

## 2023-07-28 DIAGNOSIS — R13.10 DYSPHAGIA, UNSPECIFIED TYPE: Primary | ICD-10-CM

## 2023-07-28 DIAGNOSIS — M54.16 LUMBAR RADICULOPATHY: Chronic | ICD-10-CM

## 2023-07-28 DIAGNOSIS — M25.561 CHRONIC PAIN OF RIGHT KNEE: ICD-10-CM

## 2023-07-28 DIAGNOSIS — M54.14 THORACIC RADICULOPATHY: Chronic | ICD-10-CM

## 2023-07-28 PROCEDURE — 3078F PR MOST RECENT DIASTOLIC BLOOD PRESSURE < 80 MM HG: ICD-10-PCS | Mod: CPTII,,, | Performed by: PHYSICIAN ASSISTANT

## 2023-07-28 PROCEDURE — 1159F PR MEDICATION LIST DOCUMENTED IN MEDICAL RECORD: ICD-10-PCS | Mod: CPTII,,, | Performed by: PHYSICIAN ASSISTANT

## 2023-07-28 PROCEDURE — 1101F PT FALLS ASSESS-DOCD LE1/YR: CPT | Mod: CPTII,,, | Performed by: PHYSICIAN ASSISTANT

## 2023-07-28 PROCEDURE — 3075F PR MOST RECENT SYSTOLIC BLOOD PRESS GE 130-139MM HG: ICD-10-PCS | Mod: CPTII,,, | Performed by: PHYSICIAN ASSISTANT

## 2023-07-28 PROCEDURE — 99214 OFFICE O/P EST MOD 30 MIN: CPT | Mod: S$PBB,25,, | Performed by: PHYSICIAN ASSISTANT

## 2023-07-28 PROCEDURE — 3288F PR FALLS RISK ASSESSMENT DOCUMENTED: ICD-10-PCS | Mod: CPTII,,, | Performed by: PHYSICIAN ASSISTANT

## 2023-07-28 PROCEDURE — 99215 OFFICE O/P EST HI 40 MIN: CPT | Mod: PBBFAC,25 | Performed by: PHYSICIAN ASSISTANT

## 2023-07-28 PROCEDURE — 96372 THER/PROPH/DIAG INJ SC/IM: CPT | Mod: PBBFAC | Performed by: PHYSICIAN ASSISTANT

## 2023-07-28 PROCEDURE — 1125F AMNT PAIN NOTED PAIN PRSNT: CPT | Mod: CPTII,,, | Performed by: PHYSICIAN ASSISTANT

## 2023-07-28 PROCEDURE — 1159F MED LIST DOCD IN RCRD: CPT | Mod: CPTII,,, | Performed by: PHYSICIAN ASSISTANT

## 2023-07-28 PROCEDURE — 1101F PR PT FALLS ASSESS DOC 0-1 FALLS W/OUT INJ PAST YR: ICD-10-PCS | Mod: CPTII,,, | Performed by: PHYSICIAN ASSISTANT

## 2023-07-28 PROCEDURE — 3075F SYST BP GE 130 - 139MM HG: CPT | Mod: CPTII,,, | Performed by: PHYSICIAN ASSISTANT

## 2023-07-28 PROCEDURE — 99214 PR OFFICE/OUTPT VISIT, EST, LEVL IV, 30-39 MIN: ICD-10-PCS | Mod: S$PBB,25,, | Performed by: PHYSICIAN ASSISTANT

## 2023-07-28 PROCEDURE — 3288F FALL RISK ASSESSMENT DOCD: CPT | Mod: CPTII,,, | Performed by: PHYSICIAN ASSISTANT

## 2023-07-28 PROCEDURE — 1125F PR PAIN SEVERITY QUANTIFIED, PAIN PRESENT: ICD-10-PCS | Mod: CPTII,,, | Performed by: PHYSICIAN ASSISTANT

## 2023-07-28 PROCEDURE — 3078F DIAST BP <80 MM HG: CPT | Mod: CPTII,,, | Performed by: PHYSICIAN ASSISTANT

## 2023-07-28 RX ORDER — KETOROLAC TROMETHAMINE 30 MG/ML
30 INJECTION, SOLUTION INTRAMUSCULAR; INTRAVENOUS
Status: COMPLETED | OUTPATIENT
Start: 2023-07-28 | End: 2023-07-28

## 2023-07-28 RX ORDER — DAPAGLIFLOZIN 10 MG/1
10 TABLET, FILM COATED ORAL
COMMUNITY
Start: 2023-07-18

## 2023-07-28 RX ORDER — HYDROCODONE BITARTRATE AND ACETAMINOPHEN 7.5; 325 MG/1; MG/1
1 TABLET ORAL EVERY 8 HOURS PRN
Qty: 90 TABLET | Refills: 0 | Status: SHIPPED | OUTPATIENT
Start: 2023-08-05 | End: 2023-08-23 | Stop reason: SDUPTHER

## 2023-07-28 RX ADMIN — KETOROLAC TROMETHAMINE 30 MG: 60 INJECTION, SOLUTION INTRAMUSCULAR at 08:07

## 2023-07-28 NOTE — PATIENT INSTRUCTIONS

## 2023-08-01 ENCOUNTER — TELEPHONE (OUTPATIENT)
Dept: PAIN MEDICINE | Facility: CLINIC | Age: 83
End: 2023-08-01
Payer: MEDICARE

## 2023-08-01 NOTE — TELEPHONE ENCOUNTER
----- Message from Estrada Wilkerson LPN sent at 8/1/2023  2:23 PM CDT -----  Can you call and let her know dr knapp denied her procedure? Stating she had stent done in May   MAISHA SANTORO   08/01/2023   2:33 PM   Spoke with patients daughter, informed cannot have procedure at this time.

## 2023-08-23 ENCOUNTER — ANESTHESIA EVENT (OUTPATIENT)
Dept: GASTROENTEROLOGY | Facility: HOSPITAL | Age: 83
End: 2023-08-23
Payer: MEDICARE

## 2023-08-23 ENCOUNTER — HOSPITAL ENCOUNTER (OUTPATIENT)
Dept: GASTROENTEROLOGY | Facility: HOSPITAL | Age: 83
Discharge: HOME | End: 2023-08-23
Attending: INTERNAL MEDICINE
Payer: MEDICARE

## 2023-08-23 ENCOUNTER — ANESTHESIA (OUTPATIENT)
Dept: GASTROENTEROLOGY | Facility: HOSPITAL | Age: 83
End: 2023-08-23
Payer: MEDICARE

## 2023-08-23 VITALS
RESPIRATION RATE: 20 BRPM | DIASTOLIC BLOOD PRESSURE: 54 MMHG | HEART RATE: 62 BPM | OXYGEN SATURATION: 95 % | SYSTOLIC BLOOD PRESSURE: 111 MMHG

## 2023-08-23 DIAGNOSIS — K21.9 GASTROESOPHAGEAL REFLUX DISEASE, UNSPECIFIED WHETHER ESOPHAGITIS PRESENT: ICD-10-CM

## 2023-08-23 DIAGNOSIS — R13.10 DYSPHAGIA, UNSPECIFIED TYPE: ICD-10-CM

## 2023-08-23 DIAGNOSIS — K29.00 ACUTE SUPERFICIAL GASTRITIS WITHOUT HEMORRHAGE: ICD-10-CM

## 2023-08-23 DIAGNOSIS — R13.19 ESOPHAGEAL DYSPHAGIA: Primary | ICD-10-CM

## 2023-08-23 LAB — GLUCOSE SERPL-MCNC: 114 (ref 70–110)

## 2023-08-23 PROCEDURE — 43450 DILATE ESOPHAGUS 1/MULT PASS: CPT | Performed by: INTERNAL MEDICINE

## 2023-08-23 PROCEDURE — 43450 DILATE ESOPHAGUS 1/MULT PASS: CPT | Mod: ,,, | Performed by: INTERNAL MEDICINE

## 2023-08-23 PROCEDURE — 63600175 PHARM REV CODE 636 W HCPCS: Performed by: NURSE ANESTHETIST, CERTIFIED REGISTERED

## 2023-08-23 PROCEDURE — 43450 PR DILATE ESOPHAGUS: ICD-10-PCS | Mod: ,,, | Performed by: INTERNAL MEDICINE

## 2023-08-23 PROCEDURE — 25000003 PHARM REV CODE 250: Performed by: NURSE ANESTHETIST, CERTIFIED REGISTERED

## 2023-08-23 PROCEDURE — D9220A PRA ANESTHESIA: Mod: ,,, | Performed by: NURSE ANESTHETIST, CERTIFIED REGISTERED

## 2023-08-23 PROCEDURE — D9220A PRA ANESTHESIA: ICD-10-PCS | Mod: ,,, | Performed by: NURSE ANESTHETIST, CERTIFIED REGISTERED

## 2023-08-23 PROCEDURE — 37000008 HC ANESTHESIA 1ST 15 MINUTES

## 2023-08-23 PROCEDURE — 82962 GLUCOSE BLOOD TEST: CPT

## 2023-08-23 RX ORDER — SODIUM CHLORIDE 0.9 % (FLUSH) 0.9 %
10 SYRINGE (ML) INJECTION
Status: DISCONTINUED | OUTPATIENT
Start: 2023-08-23 | End: 2023-08-24 | Stop reason: HOSPADM

## 2023-08-23 RX ORDER — ESOMEPRAZOLE MAGNESIUM 40 MG/1
40 CAPSULE, DELAYED RELEASE ORAL DAILY
Qty: 90 CAPSULE | Refills: 1 | Status: SHIPPED | OUTPATIENT
Start: 2023-08-23 | End: 2023-08-28 | Stop reason: SDUPTHER

## 2023-08-23 RX ORDER — PROPOFOL 10 MG/ML
INJECTION, EMULSION INTRAVENOUS
Status: DISCONTINUED | OUTPATIENT
Start: 2023-08-23 | End: 2023-08-23

## 2023-08-23 RX ADMIN — SODIUM CHLORIDE: 9 INJECTION, SOLUTION INTRAVENOUS at 01:08

## 2023-08-23 RX ADMIN — PROPOFOL 30 MG: 10 INJECTION, EMULSION INTRAVENOUS at 01:08

## 2023-08-23 RX ADMIN — PROPOFOL 50 MG: 10 INJECTION, EMULSION INTRAVENOUS at 01:08

## 2023-08-23 NOTE — PROGRESS NOTES
Subjective:         Patient ID: Sharee Elizonod is a 83 y.o. female.    Chief Complaint: Neck Pain, Leg Pain (left), and Low-back Pain        Pain  This is a chronic problem. The current episode started more than 1 year ago. The problem occurs daily. The problem has been unchanged. Associated symptoms include arthralgias and neck pain. Pertinent negatives include no anorexia, chest pain, coughing, diaphoresis, fever, sore throat, urinary symptoms, vertigo or vomiting.     Review of Systems   Constitutional:  Negative for activity change, appetite change, diaphoresis, fever and unexpected weight change.   HENT:  Negative for drooling, ear discharge, ear pain, facial swelling, mouth dryness, nosebleeds, sore throat, trouble swallowing, voice change and goiter.    Eyes:  Negative for photophobia, pain, discharge, redness and visual disturbance.   Respiratory:  Negative for apnea, cough, choking, chest tightness, shortness of breath, wheezing and stridor.    Cardiovascular:  Negative for chest pain, palpitations and leg swelling.   Gastrointestinal:  Negative for abdominal distention, anorexia, diarrhea, rectal pain, vomiting and fecal incontinence.   Endocrine: Negative for cold intolerance, heat intolerance, polydipsia, polyphagia and polyuria.   Genitourinary:  Negative for bladder incontinence, dysuria, flank pain, frequency and hot flashes.   Musculoskeletal:  Positive for arthralgias, back pain, leg pain and neck pain.   Integumentary:  Negative for color change and pallor.   Allergic/Immunologic: Negative for immunocompromised state.   Neurological:  Negative for dizziness, vertigo, seizures, syncope, facial asymmetry, speech difficulty, light-headedness, coordination difficulties, memory loss and coordination difficulties.   Hematological:  Negative for adenopathy. Does not bruise/bleed easily.   Psychiatric/Behavioral:  Negative for agitation, behavioral problems, confusion, decreased concentration, dysphoric mood,  hallucinations, self-injury and suicidal ideas. The patient is not nervous/anxious and is not hyperactive.            Past Medical History:   Diagnosis Date    Acute superficial gastritis without hemorrhage 02/18/2022    Atrial fibrillation     Automatic implantable cardiac defibrillator in situ     Bilateral primary osteoarthritis of knee     Cervical spondylosis without myelopathy     CHF (congestive heart failure)     Chronic ischemic heart disease     Chronic kidney disease (CKD), stage III (moderate)     Chronic pain syndrome     COPD (chronic obstructive pulmonary disease)     Coronary arteriosclerosis     Depressive disorder     Esophageal dysphagia 02/18/2022    GERD (gastroesophageal reflux disease)     Heart attack     may 2023    HH (hiatus hernia) 02/18/2022    Hypothyroidism     Low back pain     Lumbar radiculopathy     Melanoma 2019    scalp    Metabolic syndrome     Myocardial ischemia     Other long term (current) drug therapy     Radiculopathy, cervical region     Type 2 diabetes mellitus      Past Surgical History:   Procedure Laterality Date    BACK SURGERY      bilateral knee injection/ aspiration of joint/ bursa-large  Bilateral 4/4/2017 3/27/2017 3/20/2017    bilateral knee orthovisc injection    CARDIAC CATHETERIZATION      CARDIAC SURGERY      EPIDURAL STEROID INJECTION INTO CERVICAL SPINE  6/19/2019 5/17/2017 4/26/2017    C6-7 LONNIEDr Farah    EPIDURAL STEROID INJECTION INTO LUMBAR SPINE  03/15/2018    L4-5 LONNIEDr Farah    HYSTERECTOMY      INJECTION OF ANESTHETIC AGENT AROUND MEDIAL BRANCH NERVES INNERVATING LUMBAR FACET JOINT Bilateral 04/14/2022    Procedure: Block-nerve-medial branch-lumbar, bilateral L4 through S1;  Surgeon: Angie Farah MD;  Location: [facility];  Service: Pain Management;  Laterality: Bilateral;  pt aware at visit to be tested  4/12 patient getting covid test today    INJECTION OF FACET JOINT Bilateral 8/17/2017  7/19/2017    Bilateral C3-7 FI, Dr Farah    INJECTION OF FACET JOINT Left 10/18/2018    left C3-7 FI, Dr Farah    INJECTION OF FACET JOINT Bilateral 8/22/2018 6/29/2018    bilateral L3-S1 FI, Dr Farah    RADIOFREQUENCY ABLATION Right 12/12/2018    Right C3-7 RF, Dr Farah    RADIOFREQUENCY ABLATION Left 01/9/2019 10/16/2017 10/7/2020    Left C3-7 RF, Dr Farah    RADIOFREQUENCY ABLATION Left 1/9/2019 10/16/2017    left C3-7 RF, Dr Farah    RADIOFREQUENCY THERMAL COAGULATION OF MEDIAL BRANCH OF POSTERIOR RAMUS OF CERVICAL SPINAL NERVE Left 08/05/2021    Procedure: RADIOFREQUENCY THERMAL COAGULATION, NERVE, SPINAL, CERVICAL, POSTERIOR RAMUS, MEDIAL BRANCH, Left C3-4,4-5  only 2 levels resubmitted dt denied all levels on 8-3-2021;  Surgeon: Angie Farah MD;  Location: [facility];  Service: Pain Management;  Laterality: Left;    RADIOFREQUENCY THERMAL COAGULATION OF MEDIAL BRANCH OF POSTERIOR RAMUS OF CERVICAL SPINAL NERVE Right 08/24/2021    Procedure: RADIOFREQUENCY THERMAL COAGULATION, NERVE, SPINAL, CERVICAL, POSTERIOR RAMUS, MEDIAL BRANCH RIGHT C3-C5 RFTC;  Surgeon: Angie Farah MD;  Location: [facility];  Service: Pain Management;  Laterality: Right;  HAD VAC WILL BRING CARD    TRANSFORAMINAL EPIDURAL INJECTION OF STEROID Right 5/30/2018 5/2/2018    Right L4-5 TFESI, Dr Farah     Social History     Socioeconomic History    Marital status:    Tobacco Use    Smoking status: Every Day     Current packs/day: 0.50     Average packs/day: 0.5 packs/day for 40.0 years (20.0 ttl pk-yrs)     Types: Cigarettes     Passive exposure: Current    Smokeless tobacco: Never   Substance and Sexual Activity    Alcohol use: Not Currently    Drug use: Never     Family History   Problem Relation Age of Onset    Hypertension Mother     Diabetes Mellitus Mother      Review of patient's allergies indicates:  No Known Allergies     Objective:  Vitals:    08/28/23 1009 08/28/23 1010   BP:  "111/61    Pulse: 78    Resp: 20    Weight: 56.7 kg (125 lb)    Height: 5' 3" (1.6 m)    PainSc:   7   7           Physical Exam  Vitals and nursing note reviewed. Exam conducted with a chaperone present.   Constitutional:       General: She is awake. She is not in acute distress.     Appearance: Normal appearance. She is not ill-appearing or toxic-appearing.   HENT:      Head: Normocephalic and atraumatic.      Nose: Nose normal.      Mouth/Throat:      Mouth: Mucous membranes are moist.      Pharynx: Oropharynx is clear.   Eyes:      Conjunctiva/sclera: Conjunctivae normal.      Pupils: Pupils are equal, round, and reactive to light.   Pulmonary:      Effort: Pulmonary effort is normal. No respiratory distress.   Abdominal:      Palpations: Abdomen is soft.      Tenderness: There is no guarding.   Musculoskeletal:         General: Normal range of motion.      Cervical back: Normal range of motion and neck supple. Tenderness present. No rigidity.      Thoracic back: Tenderness present.      Lumbar back: Tenderness present.   Skin:     General: Skin is warm and dry.      Coloration: Skin is not jaundiced or pale.   Neurological:      General: No focal deficit present.      Mental Status: She is alert and oriented to person, place, and time. Mental status is at baseline.      Cranial Nerves: No cranial nerve deficit (II-XII).      Gait: Gait abnormal.   Psychiatric:         Mood and Affect: Mood normal.         Behavior: Behavior normal. Behavior is cooperative.         Thought Content: Thought content normal.         EGD  Narrative: Table formatting from the original result was not included.  Procedure Date  8/23/23    Impression  Overall   Impression:    Dilated in the esophagus with Bejarano dilator  Erythematous mucosa in the fundus of the stomach and antrum  Mucosa of the esophagus was normal with z-line at 36cm. There appeared to   be some esophageal spasm. The esophagus was dilated with a 48F Bejarano "   dilator.  The stomach has gastritis without ulcers. Mucosa of the duodenum was   normal.    Recommendation   Follow up with PCP     Continue PPI; repeat esophageal dilation prn  Discharge:  Disp: DC to home. Resume diet. No driving x 24 hours. F/U with PCP as   scheduled.  Dx: esophageal dysphagia, gastritis, s/p dilation of the esophagus    Indication  Dysphagia, unspecified type    Providers  Parish Izaguirre Jr., Kelsey Crenshaw CRNA Technician   Michael Long MD Proceduralist   Enid Liu, RN Registered Nurse     Medications  Moderate sedation administered by anesthesia staff - See anesthesia   record.    Preprocedure  A history and physical has been performed, and patient medication   allergies have been reviewed. The patient's tolerance of previous   anesthesia has been reviewed. The risks and benefits of the procedure and   the sedation options and risks were discussed with the patient. All   questions were answered and informed consent obtained.    ASA Score: ASA 3 - Patient with moderate systemic disease with functional   limitations  Mallampati Airway Score: II (hard and soft palate, upper portion of   tonsils anduvula visible)    Details of the Procedure  The patient underwent monitored anesthesia care, which was administered by   an anesthesia professional. The patient's blood pressure, heart rate,   level of consciousness, oxygen, respirations, ECG and ETCO2 were monitored   throughout the procedure. The scope was introduced through the mouth and   advanced to the third part of the duodenum. Retroflexion was performed in   the cardia. Prior to the procedure, the patient's H. Pylori status was   unknown. The patient's estimated blood loss was minimal (<5 mL). The   procedure was not difficult. The patient tolerated the procedure well.   There were no apparent adverse events.     Scope: Gastroscope  Scope Serial: [id number]    Events  Procedure Events   Event Event Time      Procedure Events   Event Event Time   SCOPE IN 8/23/2023  1:19 PM   SCOPE OUT 8/23/2023  1:21 PM     Findings  Dilated in the esophagus with Bejarano dilator  Erythematous mucosa in the fundus of the stomach and antrum         Hospital Outpatient Visit on 08/23/2023   Component Date Value Ref Range Status    POC Glucose 08/23/2023 114 (A)  70 - 110 took with own meter at 9 am Final   Office Visit on 05/25/2023   Component Date Value Ref Range Status    POC Amphetamines 05/25/2023 Negative  Negative, Inconclusive Final    POC Barbiturates 05/25/2023 Negative  Negative, Inconclusive Final    POC Benzodiazepines 05/25/2023 Negative  Negative, Inconclusive Final    POC Cocaine 05/25/2023 Negative  Negative, Inconclusive Final    POC THC 05/25/2023 Negative  Negative, Inconclusive Final    POC Methadone 05/25/2023 Negative  Negative, Inconclusive Final    POC Methamphetamine 05/25/2023 Negative  Negative, Inconclusive Final    POC Opiates 05/25/2023 Presumptive Positive (A)  Negative, Inconclusive Final    POC Oxycodone 05/25/2023 Negative  Negative, Inconclusive Final    POC Phencyclidine 05/25/2023 Negative  Negative, Inconclusive Final    POC Methylenedioxymethamphetamine * 05/25/2023 Negative  Negative, Inconclusive Final    POC Tricyclic Antidepressants 05/25/2023 Negative  Negative, Inconclusive Final    POC Buprenorphine 05/25/2023 Negative   Final     Acceptable 05/25/2023 Yes   Final    POC Temperature (Urine) 05/25/2023 92   Final   Office Visit on 05/01/2023   Component Date Value Ref Range Status    Hemoglobin A1C 05/02/2023 7.0 (H)  4.5 - 6.6 % Final    Estimated Average Glucose 05/02/2023 147  mg/dL Final   Hospital Outpatient Visit on 04/17/2023   Component Date Value Ref Range Status    Case Report 04/17/2023    Final                    Value:Surgical Pathology                                Case: [id number]                                   Authorizing Provider:   Michael Long MD      Collected:           04/17/2023 10:20 AM          Ordering Location:     [facility] -         Received:            04/17/2023 12:32 PM                                 Endoscopy                                                                    Pathologist:           Rene Alegre MD                                                            Specimens:   A) - Stomach, a. stomach bx                                                                         B) - Esophagus, b. esophagus bx                                                            Final Diagnosis 04/17/2023    Final                    Value:This result contains rich text formatting which cannot be displayed here.    Gross Description 04/17/2023    Final                    Value:This result contains rich text formatting which cannot be displayed here.    Microscopic Description 04/17/2023    Final                    Value:This result contains rich text formatting which cannot be displayed here.    Laboratory Notes 04/17/2023    Final                    Value:This result contains rich text formatting which cannot be displayed here.         Orders Placed This Encounter   Procedures    Large Joint Aspiration/Injection: L knee     This order was created via procedure documentation    POCT Urine Drug Screen Presump     Interpretive Information:     Negative:  No drug detected at the cut off level.   Positive:  This result represents presumptive positive for the   tested drug, other substances may yield a positive response other   than the analyte of interest. This result should be utilized for   diagnostic purpose only. Confirmation testing will be performed upon physician request only.            Requested Prescriptions     Signed Prescriptions Disp Refills    HYDROcodone-acetaminophen (NORCO) 7.5-325 mg per tablet 90 tablet 0     Sig: Take 1 tablet by mouth every 8 (eight) hours as needed for Pain.     HYDROcodone-acetaminophen (NORCO) 7.5-325 mg per tablet 90 tablet 0     Sig: Take 1 tablet by mouth every 8 (eight) hours as needed for Pain.    HYDROcodone-acetaminophen (NORCO) 7.5-325 mg per tablet 90 tablet 0     Sig: Take 1 tablet by mouth every 8 (eight) hours as needed for Pain.       Assessment:     1. Chronic pain of left knee    2. 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 Chronic pain of right knee    4. Lumbar radiculopathy    5. Thoracic radiculopathy    6. Osteoarthrosis multiple sites, not specified as generalized    7. Encounter for long-term (current) use of other medications           A's of Opioid Risk Assessment  Activity:Patient can perform ADL.   Analgesia:Patients pain is partially controlled by current medication. Patient has tried OTC medications such as Tylenol and Ibuprofen with out relief.   Adverse Effects: Patient denies constipation or sedation.  Aberrant Behavior:  reviewed with no aberrant drug seeking/taking behavior.  Overdose reversal drug naloxone discussed    Drug screen reviewed          X-ray bilateral knee [facility] September 4, 2020 degenerative changes noted no fracture noted    CT thoracic spine degenerative changes throughout the thoracic spine radiologist did find a noncalcified 5-6 mm lesion left lower lung area recommended follow-up with CT chest 6 months   Primary care provider following this    CT cervical spine [facility] April 3, 2023, multiple level degenerative changes no high-grade spinal canal narrowing neuroforaminal narrowing greatest left C5/6 severe mild-to-moderate anterior wedging C5 vertebral body             Plan:    Narcan November 2022    History lumbar surgery 2005, Dr. Woods [facility]    August 2023 Cardiology declined holding anticoagulant after cardiac stent placement    Pharmacy closed on weekends       Cardiac defibrillator cannot have MRI    Anticoagulated cardiac stent placement    Canceled cervical RF TC August  2023 can not hold anticoagulant    Complaint cervical spine discomfort worse with flexion extension rotation cervical spine ongoing for more than 3 months pain is facet joint in nature    She will discuss with her cardiologist when she can have a cervical spine procedure     She is requesting left knee injection left knee pain     Left knee injection today    Continue home exercise program as directed    Follow-up 3 months    Dr. Farah November 2023    Bring original prescription medication bottles/container/box with labels to each visit

## 2023-08-23 NOTE — ANESTHESIA PREPROCEDURE EVALUATION
08/23/2023  Sharee Elizondo is a 83 y.o., female.      Pre-op Assessment    I have reviewed the Patient Summary Reports.     I have reviewed the Nursing Notes. I have reviewed the NPO Status.   I have reviewed the Medications.     Review of Systems  Anesthesia Hx:  No problems with previous Anesthesia    Cardiovascular:   CAD   CHF    Pulmonary:   COPD    Renal/:   Chronic Renal Disease, CKD    Hepatic/GI:   Hiatal Hernia, GERD    Musculoskeletal:   Arthritis     Neurological:   Neuromuscular Disease,    Endocrine:   Diabetes Hypothyroidism    Psych:   Psychiatric History          Physical Exam  General: Cooperative and Alert    Airway:  Mallampati: II   Mouth Opening: Normal  TM Distance: Normal  Tongue: Normal        Anesthesia Plan  Type of Anesthesia, risks & benefits discussed:    Anesthesia Type: Gen Natural Airway  Intra-op Monitoring Plan: Standard ASA Monitors  Post Op Pain Control Plan: multimodal analgesia  Induction:  IV  Informed Consent: Informed consent signed with the Patient and all parties understand the risks and agree with anesthesia plan.  All questions answered. Patient consented to blood products? Yes  ASA Score: 3  Day of Surgery Review of History & Physical: H&P Update referred to the surgeon/provider.    Ready For Surgery From Anesthesia Perspective.     .

## 2023-08-23 NOTE — H&P
[facility]  Gastroenterology  H&P    Patient Name: Sharee Elizondo  MRN: [id number]  Admission Date: 8/23/2023  Code Status: No Order    Attending Provider: Michael Long MD   Primary Care Physician: Hollis Rice MD  Principal Problem:<principal problem not specified>    Subjective:     History of Present Illness: Pt c/o dysphagia which responds to esophageal dilation; for egd with dilation today.    Past Medical History:   Diagnosis Date    Acute superficial gastritis without hemorrhage 02/18/2022    Atrial fibrillation     Automatic implantable cardiac defibrillator in situ     Bilateral primary osteoarthritis of knee     Cervical spondylosis without myelopathy     CHF (congestive heart failure)     Chronic ischemic heart disease     Chronic kidney disease (CKD), stage III (moderate)     Chronic pain syndrome     COPD (chronic obstructive pulmonary disease)     Coronary arteriosclerosis     Depressive disorder     Esophageal dysphagia 02/18/2022    GERD (gastroesophageal reflux disease)     Heart attack     may 2023    HH (hiatus hernia) 02/18/2022    Hypothyroidism     Low back pain     Lumbar radiculopathy     Melanoma 2019    scalp    Metabolic syndrome     Myocardial ischemia     Other long term (current) drug therapy     Radiculopathy, cervical region     Type 2 diabetes mellitus        Past Surgical History:   Procedure Laterality Date    BACK SURGERY      bilateral knee injection/ aspiration of joint/ bursa-large  Bilateral 4/4/2017 3/27/2017 3/20/2017    bilateral knee orthovisc injection    CARDIAC CATHETERIZATION      CARDIAC SURGERY      EPIDURAL STEROID INJECTION INTO CERVICAL SPINE  6/19/2019 5/17/2017 4/26/2017    C6-7 Dr Gagan FLEMING    EPIDURAL STEROID INJECTION INTO LUMBAR SPINE  03/15/2018    L4-5 Dr Gagan FLEMING    HYSTERECTOMY      INJECTION OF ANESTHETIC AGENT AROUND MEDIAL BRANCH NERVES INNERVATING LUMBAR FACET JOINT Bilateral 04/14/2022    Procedure: Block-nerve-medial  branch-lumbar, bilateral L4 through S1;  Surgeon: Angie Farah MD;  Location: [facility];  Service: Pain Management;  Laterality: Bilateral;  pt aware at visit to be tested  4/12 patient getting covid test today    INJECTION OF FACET JOINT Bilateral 8/17/2017 7/19/2017    Bilateral C3-7 FI, Dr Farah    INJECTION OF FACET JOINT Left 10/18/2018    left C3-7 FI, Dr Farah    INJECTION OF FACET JOINT Bilateral 8/22/2018 6/29/2018    bilateral L3-S1 FI, Dr Farah    RADIOFREQUENCY ABLATION Right 12/12/2018    Right C3-7 RF, Dr Farah    RADIOFREQUENCY ABLATION Left 01/9/2019 10/16/2017 10/7/2020    Left C3-7 RF, Dr Farah    RADIOFREQUENCY ABLATION Left 1/9/2019 10/16/2017    left C3-7 RF, Dr Farah    RADIOFREQUENCY THERMAL COAGULATION OF MEDIAL BRANCH OF POSTERIOR RAMUS OF CERVICAL SPINAL NERVE Left 08/05/2021    Procedure: RADIOFREQUENCY THERMAL COAGULATION, NERVE, SPINAL, CERVICAL, POSTERIOR RAMUS, MEDIAL BRANCH, Left C3-4,4-5  only 2 levels resubmitted dt denied all levels on 8-3-2021;  Surgeon: Angie Farah MD;  Location: [facility];  Service: Pain Management;  Laterality: Left;    RADIOFREQUENCY THERMAL COAGULATION OF MEDIAL BRANCH OF POSTERIOR RAMUS OF CERVICAL SPINAL NERVE Right 08/24/2021    Procedure: RADIOFREQUENCY THERMAL COAGULATION, NERVE, SPINAL, CERVICAL, POSTERIOR RAMUS, MEDIAL BRANCH RIGHT C3-C5 RFTC;  Surgeon: Angie Farah MD;  Location: [facility];  Service: Pain Management;  Laterality: Right;  HAD VAC WILL BRING CARD    TRANSFORAMINAL EPIDURAL INJECTION OF STEROID Right 5/30/2018 5/2/2018    Right L4-5 TFLONNIE, Dr Farah       Review of patient's allergies indicates:  No Known Allergies  Family History       Problem Relation (Age of Onset)    Diabetes Mellitus Mother    Hypertension Mother          Tobacco Use    Smoking status: Every Day     Current packs/day: 0.50     Average packs/day: 0.5 packs/day for 40.0 years (20.0 ttl pk-yrs)     Types: Cigarettes      Passive exposure: Current    Smokeless tobacco: Never   Substance and Sexual Activity    Alcohol use: Not Currently    Drug use: Never    Sexual activity: Not on file     Review of Systems   HENT:  Positive for trouble swallowing.    Respiratory: Negative.     Cardiovascular: Negative.    Gastrointestinal: Negative.      Objective:     Vital Signs (Most Recent):  Pulse: 70 (08/23/23 1217)  Resp: 15 (08/23/23 1217)  BP: 113/63 (08/23/23 1217)  SpO2: 96 % (08/23/23 1217) Vital Signs (24h Range):  Pulse:  [70] 70  Resp:  [15] 15  SpO2:  [96 %] 96 %  BP: (113)/(63) 113/63        There is no height or weight on file to calculate BMI.    No intake or output data in the 24 hours ending 08/23/23 1310    Lines/Drains/Airways       Peripheral Intravenous Line  Duration                  Peripheral IV - Single Lumen 08/23/23 1216 22 G Right Antecubital <1 day                    Physical Exam  Vitals reviewed.   Constitutional:       General: She is not in acute distress.     Appearance: Normal appearance. She is well-developed. She is not ill-appearing.   HENT:      Head: Normocephalic and atraumatic.      Nose: Nose normal.   Eyes:      Pupils: Pupils are equal, round, and reactive to light.   Cardiovascular:      Rate and Rhythm: Normal rate and regular rhythm.   Pulmonary:      Effort: Pulmonary effort is normal.      Breath sounds: Normal breath sounds. No wheezing.   Abdominal:      General: Abdomen is flat. Bowel sounds are normal. There is no distension.      Palpations: Abdomen is soft.      Tenderness: There is no abdominal tenderness. There is no guarding.   Skin:     General: Skin is warm and dry.      Coloration: Skin is not jaundiced.   Neurological:      Mental Status: She is alert.   Psychiatric:         Attention and Perception: Attention normal.         Mood and Affect: Affect normal.         Speech: Speech normal.         Behavior: Behavior is cooperative.      Comments: Pt was calm while speaking.  "        Significant Labs:  CBC: No results for input(s): "WBC", "HGB", "HCT", "PLT" in the last 48 hours.  CMP: No results for input(s): "GLU", "CALCIUM", "ALBUMIN", "PROT", "NA", "K", "CO2", "CL", "BUN", "CREATININE", "ALKPHOS", "ALT", "AST", "BILITOT" in the last 48 hours.    Significant Imaging:  Imaging results within the past 24 hours have been reviewed.    Assessment/Plan:     There are no hospital problems to display for this patient.        Imp: esophageal dysphagia  Plan: egd with dilation    Michael Long MD  Gastroenterology  [facility]  "

## 2023-08-23 NOTE — ANESTHESIA POSTPROCEDURE EVALUATION
Anesthesia Post Evaluation    Patient: Sharee Elizondo    Procedure(s) Performed: * No procedures listed *    Final Anesthesia Type: general      Patient location during evaluation: PACU  Patient participation: Yes- Able to Participate  Level of consciousness: awake and alert and oriented  Post-procedure vital signs: reviewed and stable  Pain management: adequate  Airway patency: patent    PONV status at discharge: No PONV  Anesthetic complications: no      Cardiovascular status: blood pressure returned to baseline and hemodynamically stable  Respiratory status: unassisted  Hydration status: euvolemic  Follow-up not needed.  Comments: Refer to nursing note for pain/rivera score upon discharge from recovery.          Vitals Value Taken Time   /47 08/23/23 1339   Temp  08/23/23 1339   Pulse 61 08/23/23 1339   Resp 17 08/23/23 1339   SpO2 98 % 08/23/23 1339   Vitals shown include unvalidated device data.      No case tracking events are documented in the log.      Pain/Rivera Score: Rivera Score: 10 (8/23/2023  1:26 PM)

## 2023-08-23 NOTE — TRANSFER OF CARE
Anesthesia Transfer of Care Note    Patient: Sharee Elizondo    Procedure(s) Performed: * No procedures listed *    Patient location: PACU    Anesthesia Type: general    Transport from OR: Transported from OR on room air with adequate spontaneous ventilation    Post pain: adequate analgesia    Post assessment: no apparent anesthetic complications and tolerated procedure well    Post vital signs: stable    Level of consciousness: alert and responds to stimulation    Nausea/Vomiting: no nausea/vomiting    Complications: none    Transfer of care protocol was followed      Last vitals:   Visit Vitals  BP (!) 142/70   Pulse 72   Resp (!) 25   SpO2 99%   Breastfeeding No

## 2023-08-23 NOTE — DISCHARGE INSTRUCTIONS
Procedure Date  8/23/23     Impression  Overall Impression:   Dilated in the esophagus with Bejarano dilator  Erythematous mucosa in the fundus of the stomach and antrum  Mucosa of the esophagus was normal with z-line at 36cm. There appeared to be some esophageal spasm. The esophagus was dilated with a 48F Bejarano dilator.  The stomach has gastritis without ulcers. Mucosa of the duodenum was normal.     Recommendation    Follow up with PCP      Continue PPI; repeat esophageal dilation prn  Discharge:  Disp: DC to home. Resume diet. No driving x 24 hours. F/U with PCP as scheduled.  Dx: esophageal dysphagia, gastritis, s/p dilation of the esophagus     Indication  Dysphagia, unspecified type    NO DRIVING, OPERATING EQUIPMENT, OR SIGNING LEGAL DOCUMENTS FOR 24 HOURS.

## 2023-08-28 ENCOUNTER — OFFICE VISIT (OUTPATIENT)
Dept: PAIN MEDICINE | Facility: CLINIC | Age: 83
End: 2023-08-28
Payer: MEDICARE

## 2023-08-28 VITALS
DIASTOLIC BLOOD PRESSURE: 61 MMHG | RESPIRATION RATE: 20 BRPM | BODY MASS INDEX: 22.15 KG/M2 | WEIGHT: 125 LBS | SYSTOLIC BLOOD PRESSURE: 111 MMHG | HEART RATE: 78 BPM | HEIGHT: 63 IN

## 2023-08-28 DIAGNOSIS — M54.16 LUMBAR RADICULOPATHY: Chronic | ICD-10-CM

## 2023-08-28 DIAGNOSIS — Z79.899 ENCOUNTER FOR LONG-TERM (CURRENT) USE OF OTHER MEDICATIONS: ICD-10-CM

## 2023-08-28 DIAGNOSIS — K21.9 GASTROESOPHAGEAL REFLUX DISEASE, UNSPECIFIED WHETHER ESOPHAGITIS PRESENT: ICD-10-CM

## 2023-08-28 DIAGNOSIS — E78.1 HYPERTRIGLYCERIDEMIA: ICD-10-CM

## 2023-08-28 DIAGNOSIS — M47.812 SPONDYLOSIS OF CERVICAL JOINT WITHOUT MYELOPATHY: Chronic | ICD-10-CM

## 2023-08-28 DIAGNOSIS — G89.29 CHRONIC PAIN OF LEFT KNEE: Primary | Chronic | ICD-10-CM

## 2023-08-28 DIAGNOSIS — M25.561 CHRONIC PAIN OF RIGHT KNEE: ICD-10-CM

## 2023-08-28 DIAGNOSIS — I10 HYPERTENSION, UNSPECIFIED TYPE: ICD-10-CM

## 2023-08-28 DIAGNOSIS — M25.562 CHRONIC PAIN OF LEFT KNEE: Primary | Chronic | ICD-10-CM

## 2023-08-28 DIAGNOSIS — M47.812 SPONDYLOSIS OF CERVICAL JOINT WITHOUT MYELOPATHY: ICD-10-CM

## 2023-08-28 DIAGNOSIS — M54.14 THORACIC RADICULOPATHY: Chronic | ICD-10-CM

## 2023-08-28 DIAGNOSIS — M89.49 OSTEOARTHROSIS MULTIPLE SITES, NOT SPECIFIED AS GENERALIZED: Chronic | ICD-10-CM

## 2023-08-28 DIAGNOSIS — G89.29 CHRONIC PAIN OF RIGHT KNEE: ICD-10-CM

## 2023-08-28 PROCEDURE — 3288F PR FALLS RISK ASSESSMENT DOCUMENTED: ICD-10-PCS | Mod: CPTII,,, | Performed by: PHYSICIAN ASSISTANT

## 2023-08-28 PROCEDURE — 1125F PR PAIN SEVERITY QUANTIFIED, PAIN PRESENT: ICD-10-PCS | Mod: CPTII,,, | Performed by: PHYSICIAN ASSISTANT

## 2023-08-28 PROCEDURE — 99999PBSHW POCT URINE DRUG SCREEN PRESUMP: Mod: PBBFAC,,,

## 2023-08-28 PROCEDURE — 99213 OFFICE O/P EST LOW 20 MIN: CPT | Mod: PBBFAC | Performed by: PHYSICIAN ASSISTANT

## 2023-08-28 PROCEDURE — 99214 OFFICE O/P EST MOD 30 MIN: CPT | Mod: 25,S$PBB,, | Performed by: PHYSICIAN ASSISTANT

## 2023-08-28 PROCEDURE — 1125F AMNT PAIN NOTED PAIN PRSNT: CPT | Mod: CPTII,,, | Performed by: PHYSICIAN ASSISTANT

## 2023-08-28 PROCEDURE — 3078F PR MOST RECENT DIASTOLIC BLOOD PRESSURE < 80 MM HG: ICD-10-PCS | Mod: CPTII,,, | Performed by: PHYSICIAN ASSISTANT

## 2023-08-28 PROCEDURE — 3074F PR MOST RECENT SYSTOLIC BLOOD PRESSURE < 130 MM HG: ICD-10-PCS | Mod: CPTII,,, | Performed by: PHYSICIAN ASSISTANT

## 2023-08-28 PROCEDURE — 99999PBSHW POCT URINE DRUG SCREEN PRESUMP: ICD-10-PCS | Mod: PBBFAC,,,

## 2023-08-28 PROCEDURE — 3078F DIAST BP <80 MM HG: CPT | Mod: CPTII,,, | Performed by: PHYSICIAN ASSISTANT

## 2023-08-28 PROCEDURE — 99214 PR OFFICE/OUTPT VISIT, EST, LEVL IV, 30-39 MIN: ICD-10-PCS | Mod: 25,S$PBB,, | Performed by: PHYSICIAN ASSISTANT

## 2023-08-28 PROCEDURE — 1159F MED LIST DOCD IN RCRD: CPT | Mod: CPTII,,, | Performed by: PHYSICIAN ASSISTANT

## 2023-08-28 PROCEDURE — 80305 DRUG TEST PRSMV DIR OPT OBS: CPT | Mod: PBBFAC | Performed by: PHYSICIAN ASSISTANT

## 2023-08-28 PROCEDURE — 20610 LARGE JOINT ASPIRATION/INJECTION: L KNEE: ICD-10-PCS | Mod: S$PBB,LT,, | Performed by: PHYSICIAN ASSISTANT

## 2023-08-28 PROCEDURE — 3288F FALL RISK ASSESSMENT DOCD: CPT | Mod: CPTII,,, | Performed by: PHYSICIAN ASSISTANT

## 2023-08-28 PROCEDURE — 99999PBSHW PR PBB SHADOW TECHNICAL ONLY FILED TO HB: Mod: PBBFAC,,,

## 2023-08-28 PROCEDURE — 1159F PR MEDICATION LIST DOCUMENTED IN MEDICAL RECORD: ICD-10-PCS | Mod: CPTII,,, | Performed by: PHYSICIAN ASSISTANT

## 2023-08-28 PROCEDURE — 20610 DRAIN/INJ JOINT/BURSA W/O US: CPT | Mod: PBBFAC,LT | Performed by: PHYSICIAN ASSISTANT

## 2023-08-28 PROCEDURE — 1101F PR PT FALLS ASSESS DOC 0-1 FALLS W/OUT INJ PAST YR: ICD-10-PCS | Mod: CPTII,,, | Performed by: PHYSICIAN ASSISTANT

## 2023-08-28 PROCEDURE — 3074F SYST BP LT 130 MM HG: CPT | Mod: CPTII,,, | Performed by: PHYSICIAN ASSISTANT

## 2023-08-28 PROCEDURE — 1101F PT FALLS ASSESS-DOCD LE1/YR: CPT | Mod: CPTII,,, | Performed by: PHYSICIAN ASSISTANT

## 2023-08-28 RX ORDER — ICOSAPENT ETHYL 500 MG/1
1 CAPSULE ORAL 2 TIMES DAILY
Qty: 180 CAPSULE | Refills: 1 | Status: SHIPPED | OUTPATIENT
Start: 2023-08-28

## 2023-08-28 RX ORDER — HYDROCODONE BITARTRATE AND ACETAMINOPHEN 7.5; 325 MG/1; MG/1
1 TABLET ORAL EVERY 8 HOURS PRN
Qty: 90 TABLET | Refills: 0 | Status: SHIPPED | OUTPATIENT
Start: 2023-10-07 | End: 2024-01-29 | Stop reason: SDUPTHER

## 2023-08-28 RX ORDER — AMITRIPTYLINE HYDROCHLORIDE 25 MG/1
25 TABLET, FILM COATED ORAL NIGHTLY PRN
Qty: 90 TABLET | Refills: 1 | Status: SHIPPED | OUTPATIENT
Start: 2023-08-28

## 2023-08-28 RX ORDER — CARVEDILOL 3.12 MG/1
3.12 TABLET ORAL 2 TIMES DAILY WITH MEALS
Qty: 180 TABLET | Refills: 1 | Status: SHIPPED | OUTPATIENT
Start: 2023-08-28 | End: 2024-02-24

## 2023-08-28 RX ORDER — HYDROCODONE BITARTRATE AND ACETAMINOPHEN 7.5; 325 MG/1; MG/1
1 TABLET ORAL EVERY 8 HOURS PRN
Qty: 90 TABLET | Refills: 0 | Status: SHIPPED | OUTPATIENT
Start: 2023-09-07 | End: 2023-12-05 | Stop reason: SDUPTHER

## 2023-08-28 RX ORDER — BUPIVACAINE HYDROCHLORIDE 2.5 MG/ML
2 INJECTION, SOLUTION EPIDURAL; INFILTRATION; INTRACAUDAL
Status: DISCONTINUED | OUTPATIENT
Start: 2023-08-28 | End: 2023-08-28 | Stop reason: HOSPADM

## 2023-08-28 RX ORDER — HYDROCODONE BITARTRATE AND ACETAMINOPHEN 7.5; 325 MG/1; MG/1
1 TABLET ORAL EVERY 8 HOURS PRN
Qty: 90 TABLET | Refills: 0 | Status: SHIPPED | OUTPATIENT
Start: 2023-11-06 | End: 2024-01-29 | Stop reason: SDUPTHER

## 2023-08-28 RX ORDER — FUROSEMIDE 40 MG/1
40 TABLET ORAL DAILY
Qty: 90 TABLET | Refills: 1 | Status: SHIPPED | OUTPATIENT
Start: 2023-08-28

## 2023-08-28 RX ORDER — ESOMEPRAZOLE MAGNESIUM 40 MG/1
40 CAPSULE, DELAYED RELEASE ORAL DAILY
Qty: 90 CAPSULE | Refills: 1 | Status: SHIPPED | OUTPATIENT
Start: 2023-08-28

## 2023-08-28 RX ORDER — TRIAMCINOLONE ACETONIDE 40 MG/ML
40 INJECTION, SUSPENSION INTRA-ARTICULAR; INTRAMUSCULAR
Status: DISCONTINUED | OUTPATIENT
Start: 2023-08-28 | End: 2023-08-28 | Stop reason: HOSPADM

## 2023-08-28 RX ADMIN — BUPIVACAINE HYDROCHLORIDE 2 ML: 2.5 INJECTION, SOLUTION EPIDURAL; INFILTRATION; INTRACAUDAL at 10:08

## 2023-08-28 RX ADMIN — TRIAMCINOLONE ACETONIDE 40 MG: 40 INJECTION, SUSPENSION INTRA-ARTICULAR; INTRAMUSCULAR at 10:08

## 2023-08-28 NOTE — PROCEDURES
Large Joint Aspiration/Injection: L knee    Date/Time: 8/28/2023 10:00 AM    Performed by: Pop Diaz PA  Authorized by: Pop Diaz PA    Consent Done?:  Yes (Written)  Indications:  Arthritis and pain  Site marked: the procedure site was marked    Timeout: prior to procedure the correct patient, procedure, and site was verified    Prep: patient was prepped and draped in usual sterile fashion      Details:  Needle Size:  22 G  Approach:  Lateral  Location:  Knee  Site:  L knee  Medications:  2 mL BUPivacaine (PF) 0.25% (2.5 mg/ml) 0.25 % (2.5 mg/mL); 40 mg triamcinolone acetonide 40 mg/mL  Aspirate amount (mL):  0  Patient tolerance:  Patient tolerated the procedure well with no immediate complications     Bupivacaine 0.25% 25 mg/ 10 ml , 2 cc     Tolerated procedure well    No complication noted    Band-Aid was applied

## 2023-09-01 ENCOUNTER — PATIENT OUTREACH (OUTPATIENT)
Dept: ADMINISTRATIVE | Facility: HOSPITAL | Age: 83
End: 2023-09-01

## 2023-09-01 NOTE — PROGRESS NOTES
[facility] Chart Audit for the following: Diabetic Labs.    Abstracted eGFR from 01/18/2023 CMP. Unable to locate DM Urine Screening at this time. Reviewed , [facility], and [facility] Everywhere Updated.    No upcoming appointments scheduled at this time.

## 2023-09-09 DIAGNOSIS — Z71.89 COMPLEX CARE COORDINATION: ICD-10-CM

## 2023-10-16 ENCOUNTER — OFFICE VISIT (OUTPATIENT)
Dept: FAMILY MEDICINE | Facility: CLINIC | Age: 83
End: 2023-10-16
Payer: MEDICARE

## 2023-10-16 VITALS
TEMPERATURE: 99 F | RESPIRATION RATE: 14 BRPM | WEIGHT: 126.38 LBS | HEIGHT: 63 IN | BODY MASS INDEX: 22.39 KG/M2 | HEART RATE: 71 BPM | SYSTOLIC BLOOD PRESSURE: 94 MMHG | OXYGEN SATURATION: 97 % | DIASTOLIC BLOOD PRESSURE: 62 MMHG

## 2023-10-16 DIAGNOSIS — E11.69 TYPE 2 DIABETES MELLITUS WITH OTHER SPECIFIED COMPLICATION, WITHOUT LONG-TERM CURRENT USE OF INSULIN: ICD-10-CM

## 2023-10-16 DIAGNOSIS — E03.9 HYPOTHYROIDISM, UNSPECIFIED TYPE: ICD-10-CM

## 2023-10-16 DIAGNOSIS — I48.0 PAROXYSMAL ATRIAL FIBRILLATION: ICD-10-CM

## 2023-10-16 DIAGNOSIS — I50.9 CONGESTIVE HEART FAILURE, NYHA CLASS 3, UNSPECIFIED CONGESTIVE HEART FAILURE TYPE: Primary | ICD-10-CM

## 2023-10-16 LAB
ALBUMIN SERPL BCP-MCNC: 3.5 G/DL (ref 3.5–5)
ALBUMIN/GLOB SERPL: 1 {RATIO}
ALP SERPL-CCNC: 113 U/L (ref 55–142)
ALT SERPL W P-5'-P-CCNC: 15 U/L (ref 13–56)
ANION GAP SERPL CALCULATED.3IONS-SCNC: 10 MMOL/L (ref 7–16)
AST SERPL W P-5'-P-CCNC: 10 U/L (ref 15–37)
BILIRUB SERPL-MCNC: 0.3 MG/DL (ref ?–1.2)
BUN SERPL-MCNC: 29 MG/DL (ref 7–18)
BUN/CREAT SERPL: 25 (ref 6–20)
CALCIUM SERPL-MCNC: 8.7 MG/DL (ref 8.5–10.1)
CHLORIDE SERPL-SCNC: 104 MMOL/L (ref 98–107)
CO2 SERPL-SCNC: 27 MMOL/L (ref 21–32)
CREAT SERPL-MCNC: 1.15 MG/DL (ref 0.55–1.02)
EGFR (NO RACE VARIABLE) (RUSH/TITUS): 47 ML/MIN/1.73M2
EST. AVERAGE GLUCOSE BLD GHB EST-MCNC: 154 MG/DL
GLOBULIN SER-MCNC: 3.4 G/DL (ref 2–4)
GLUCOSE SERPL-MCNC: 133 MG/DL (ref 74–106)
HBA1C MFR BLD HPLC: 7.2 % (ref 4.5–6.6)
POTASSIUM SERPL-SCNC: 4.3 MMOL/L (ref 3.5–5.1)
PROT SERPL-MCNC: 6.9 G/DL (ref 6.4–8.2)
SODIUM SERPL-SCNC: 137 MMOL/L (ref 136–145)

## 2023-10-16 PROCEDURE — 1159F MED LIST DOCD IN RCRD: CPT | Mod: ,,, | Performed by: FAMILY MEDICINE

## 2023-10-16 PROCEDURE — 3078F DIAST BP <80 MM HG: CPT | Mod: ,,, | Performed by: FAMILY MEDICINE

## 2023-10-16 PROCEDURE — 1160F PR REVIEW ALL MEDS BY PRESCRIBER/CLIN PHARMACIST DOCUMENTED: ICD-10-PCS | Mod: ,,, | Performed by: FAMILY MEDICINE

## 2023-10-16 PROCEDURE — 3078F PR MOST RECENT DIASTOLIC BLOOD PRESSURE < 80 MM HG: ICD-10-PCS | Mod: ,,, | Performed by: FAMILY MEDICINE

## 2023-10-16 PROCEDURE — 99213 OFFICE O/P EST LOW 20 MIN: CPT | Mod: ,,, | Performed by: FAMILY MEDICINE

## 2023-10-16 PROCEDURE — 1101F PR PT FALLS ASSESS DOC 0-1 FALLS W/OUT INJ PAST YR: ICD-10-PCS | Mod: ,,, | Performed by: FAMILY MEDICINE

## 2023-10-16 PROCEDURE — 3074F PR MOST RECENT SYSTOLIC BLOOD PRESSURE < 130 MM HG: ICD-10-PCS | Mod: ,,, | Performed by: FAMILY MEDICINE

## 2023-10-16 PROCEDURE — 80053 COMPREHEN METABOLIC PANEL: CPT | Mod: ,,, | Performed by: CLINICAL MEDICAL LABORATORY

## 2023-10-16 PROCEDURE — 83036 HEMOGLOBIN A1C: ICD-10-PCS | Mod: ,,, | Performed by: CLINICAL MEDICAL LABORATORY

## 2023-10-16 PROCEDURE — 1126F PR PAIN SEVERITY QUANTIFIED, NO PAIN PRESENT: ICD-10-PCS | Mod: ,,, | Performed by: FAMILY MEDICINE

## 2023-10-16 PROCEDURE — 1159F PR MEDICATION LIST DOCUMENTED IN MEDICAL RECORD: ICD-10-PCS | Mod: ,,, | Performed by: FAMILY MEDICINE

## 2023-10-16 PROCEDURE — 1101F PT FALLS ASSESS-DOCD LE1/YR: CPT | Mod: ,,, | Performed by: FAMILY MEDICINE

## 2023-10-16 PROCEDURE — 80053 COMPREHENSIVE METABOLIC PANEL: ICD-10-PCS | Mod: ,,, | Performed by: CLINICAL MEDICAL LABORATORY

## 2023-10-16 PROCEDURE — 1126F AMNT PAIN NOTED NONE PRSNT: CPT | Mod: ,,, | Performed by: FAMILY MEDICINE

## 2023-10-16 PROCEDURE — 1160F RVW MEDS BY RX/DR IN RCRD: CPT | Mod: ,,, | Performed by: FAMILY MEDICINE

## 2023-10-16 PROCEDURE — 99213 PR OFFICE/OUTPT VISIT, EST, LEVL III, 20-29 MIN: ICD-10-PCS | Mod: ,,, | Performed by: FAMILY MEDICINE

## 2023-10-16 PROCEDURE — 83036 HEMOGLOBIN GLYCOSYLATED A1C: CPT | Mod: ,,, | Performed by: CLINICAL MEDICAL LABORATORY

## 2023-10-16 PROCEDURE — 3288F PR FALLS RISK ASSESSMENT DOCUMENTED: ICD-10-PCS | Mod: ,,, | Performed by: FAMILY MEDICINE

## 2023-10-16 PROCEDURE — 3288F FALL RISK ASSESSMENT DOCD: CPT | Mod: ,,, | Performed by: FAMILY MEDICINE

## 2023-10-16 PROCEDURE — 3074F SYST BP LT 130 MM HG: CPT | Mod: ,,, | Performed by: FAMILY MEDICINE

## 2023-10-16 RX ORDER — POTASSIUM CHLORIDE 20 MEQ/1
20 TABLET, EXTENDED RELEASE ORAL
COMMUNITY
Start: 2023-08-07

## 2023-10-16 RX ORDER — INSULIN DEGLUDEC 100 U/ML
INJECTION, SOLUTION SUBCUTANEOUS
COMMUNITY
End: 2023-10-16

## 2023-10-16 NOTE — PROGRESS NOTES
Sharee Elizondo is a 83 y.o. female seen today for follow-up on her diabetes history of congestive heart failure GERD   and type 2 diabetes.  She is up-to-date on her flu vaccination and her pneumonia vaccination.  Patient is considering the RSV and COVID booster.  Patient is up-to-date on her diabetic eye exam and had no changes to her prescription.  She denies any chest pain but does have shortness of breath with exertion likely secondary to her congestive heart failure.  Patient reports her lung exam was normal.  Patient reports she also recently had her thyroid levels checked after an adjustment and they were to goal.    Past Medical History:   Diagnosis Date    Acute superficial gastritis without hemorrhage 02/18/2022    Atrial fibrillation     Automatic implantable cardiac defibrillator in situ     Bilateral primary osteoarthritis of knee     Cervical spondylosis without myelopathy     CHF (congestive heart failure)     Chronic ischemic heart disease     Chronic kidney disease (CKD), stage III (moderate)     Chronic pain syndrome     COPD (chronic obstructive pulmonary disease)     Coronary arteriosclerosis     Depressive disorder     Esophageal dysphagia 02/18/2022    GERD (gastroesophageal reflux disease)     Heart attack     may 2023    HH (hiatus hernia) 02/18/2022    Hypothyroidism     Low back pain     Lumbar radiculopathy     Melanoma 2019    scalp    Metabolic syndrome     Myocardial ischemia     Other long term (current) drug therapy     Radiculopathy, cervical region     Type 2 diabetes mellitus      Family History   Problem Relation Age of Onset    Hypertension Mother     Diabetes Mellitus Mother      Current Outpatient Medications on File Prior to Visit   Medication Sig Dispense Refill    albuterol (PROVENTIL/VENTOLIN HFA) 90 mcg/actuation inhaler Inhale 1 puff into the lungs every 6 (six) hours as needed for Wheezing. Rescue 8 g 0    amitriptyline (ELAVIL) 25 MG tablet Take 1 tablet (25 mg total) by  mouth nightly as needed for Insomnia. 90 tablet 1    ascorbic acid, vitamin C, (VITAMIN C) 500 MG tablet Take 500 mg by mouth once daily.      aspirin 81 mg Cap Take 81 mg by mouth Daily.      blood sugar diagnostic (TRUE METRIX GLUCOSE TEST STRIP) Strp TEST BLOOD SUGAR TWICE DAILY FOR DIABETES 200 strip 5    blood-glucose meter Misc Use to check blood glucose twice daily for diabetes, E11.9 1 each 0    carvediloL (COREG) 3.125 MG tablet Take 1 tablet (3.125 mg total) by mouth 2 (two) times daily with meals. 180 tablet 1    cholecalciferol, vitamin D3, (VITAMIN D3) 25 mcg (1,000 unit) capsule Take 2,000 Units by mouth 2 (two) times daily.      digoxin (LANOXIN) 125 mcg tablet Take 125 mcg by mouth once daily.      dulaglutide (TRULICITY) 1.5 mg/0.5 mL pen injector Inject 1.5 mg into the skin every 7 days. 12 pen 1    esomeprazole (NEXIUM) 40 MG capsule Take 1 capsule (40 mg total) by mouth once daily. 90 capsule 1    FARXIGA 10 mg tablet Take 10 mg by mouth.      flash glucose scanning reader (FREESTYLE DESI 2 READER) [facility] Use for checking blood sugar as needed. 1 each 5    flash glucose sensor (FREESTYLE DESI 2 SENSOR) Kit Use to check blood sugar daily as needed for 2 weeks 3 kit 5    fluticasone propionate (FLONASE) 50 mcg/actuation nasal spray 2 sprays (100 mcg total) by Each Nostril route once daily. 48 g 2    furosemide (LASIX) 40 MG tablet Take 1 tablet (40 mg total) by mouth once daily. 90 tablet 1    gabapentin (NEURONTIN) 100 MG capsule Take 1 capsule (100 mg total) by mouth every 8 (eight) hours. 270 capsule 2    HYDROcodone-acetaminophen (NORCO) 7.5-325 mg per tablet Take 1 tablet by mouth every 8 (eight) hours as needed for Pain. 90 tablet 0    HYDROcodone-acetaminophen (NORCO) 7.5-325 mg per tablet Take 1 tablet by mouth every 8 (eight) hours as needed for Pain. 90 tablet 0    [START ON 11/6/2023] HYDROcodone-acetaminophen (NORCO) 7.5-325 mg per tablet Take 1 tablet by mouth every 8 (eight) hours as  "needed for Pain. 90 tablet 0    icosapent ethyL (VASCEPA) 0.5 gram Cap Take 1 capsule by mouth 2 (two) times a day. 180 capsule 1    insulin degludec (TRESIBA FLEXTOUCH U-100) 100 unit/mL (3 mL) insulin pen Inject 14 Units into the skin every morning. 4.2 mL 11    isosorbide mononitrate (IMDUR) 30 MG 24 hr tablet Take 30 mg by mouth.      lancets (TRUEPLUS LANCETS) 33 gauge Misc Use with meter daily 100 each 5    levothyroxine (SYNTHROID) 88 MCG tablet Take 88 mcg by mouth before breakfast.      methocarbamoL (ROBAXIN) 500 MG Tab Take 1 tablet (500 mg total) by mouth 2 (two) times daily. 180 tablet 1    montelukast (SINGULAIR) 10 mg tablet Take 1 tablet (10 mg total) by mouth every evening. 90 tablet 1    nitroGLYCERIN (NITROSTAT) 0.4 MG SL tablet Place 1 tablet (0.4 mg total) under the tongue every 5 (five) minutes as needed for Chest pain. 30 tablet 2    pen needle, diabetic 32 gauge x 1/4" Ndle Use daily to check blood glucose 100 each 2    potassium chloride SA (K-DUR,KLOR-CON) 20 MEQ tablet Take 20 mEq by mouth.      prasugreL (EFFIENT) 10 mg Tab Take 10 mg by mouth.      rosuvastatin (CRESTOR) 40 MG Tab Take 1 tablet (40 mg total) by mouth every evening. 90 tablet 1    sertraline (ZOLOFT) 50 MG tablet Take 1 tablet (50 mg total) by mouth once daily. 90 tablet 1    [DISCONTINUED] insulin degludec 100 unit/mL injection 14 units SQ once daily       No current facility-administered medications on file prior to visit.     Immunization History   Administered Date(s) Administered    COVID-19, MRNA, LN-S, PF (MODERNA FULL 0.5 ML DOSE) 03/10/2021, 04/07/2021    Influenza (FLUAD) - Quadrivalent - Adjuvanted - PF *Preferred* (65+) 10/27/2022    Influenza - Quadrivalent - High Dose - PF (65 years and older) 10/06/2021, 10/04/2023    Pneumococcal Conjugate - 20 Valent 01/18/2023       Review of Systems   Constitutional:  Negative for fever, malaise/fatigue and weight loss.   Respiratory:  Positive for shortness of breath.  "   Cardiovascular:  Negative for chest pain and palpitations.   Gastrointestinal:  Negative for nausea and vomiting.   Psychiatric/Behavioral:  Negative for depression.         Vitals:    10/16/23 0927   BP: 94/62   Pulse: 71   Resp: 14   Temp: 98.6 °F (37 °C)       Physical Exam  Vitals reviewed.   Constitutional:       Appearance: Normal appearance.   HENT:      Head: Normocephalic.   Eyes:      Extraocular Movements: Extraocular movements intact.      Conjunctiva/sclera: Conjunctivae normal.      Pupils: Pupils are equal, round, and reactive to light.   Neck:      Thyroid: No thyroid mass or thyromegaly.   Cardiovascular:      Rate and Rhythm: Normal rate and regular rhythm.      Heart sounds: Normal heart sounds. No murmur heard.     No gallop.   Pulmonary:      Effort: Pulmonary effort is normal. No respiratory distress.      Breath sounds: Normal breath sounds. No wheezing or rales.   Skin:     General: Skin is warm and dry.      Coloration: Skin is not jaundiced or pale.   Neurological:      Mental Status: She is alert.   Psychiatric:         Mood and Affect: Mood normal.         Behavior: Behavior normal.         Thought Content: Thought content normal.         Judgment: Judgment normal.          Assessment and Plan  1. Congestive heart failure, NYHA class 3, unspecified congestive heart failure type  Assessment & Plan:  Will continue current meds and treatment. Will continue to monitor for exacerabations    Orders:  -     Comprehensive Metabolic Panel; Future; Expected date: 10/16/2023    2. Paroxysmal atrial fibrillation  Assessment & Plan:  Will continue treatment and to monitor for worsening symptoms      3. Hypothyroidism, unspecified type  -     TSH    4. Type 2 diabetes mellitus with other specified complication, without long-term current use of insulin  -     Hemoglobin A1C; Future; Expected date: 10/16/2023             Return to clinic in 6 months or as needed once her lab work is in.    [facility]  Maintenance Topics with due status: Not Due       Topic Last Completion Date    DEXA Scan 08/29/2022

## 2023-10-19 ENCOUNTER — TELEPHONE (OUTPATIENT)
Dept: FAMILY MEDICINE | Facility: CLINIC | Age: 83
End: 2023-10-19
Payer: MEDICARE

## 2023-10-19 NOTE — TELEPHONE ENCOUNTER
----- Message from Hollis Rice MD sent at 10/18/2023 12:14 PM CDT -----  Good A1c with stable mild renal impairment.

## 2023-12-05 DIAGNOSIS — M25.561 CHRONIC PAIN OF RIGHT KNEE: ICD-10-CM

## 2023-12-05 DIAGNOSIS — M89.49 OSTEOARTHROSIS MULTIPLE SITES, NOT SPECIFIED AS GENERALIZED: Chronic | ICD-10-CM

## 2023-12-05 DIAGNOSIS — G89.29 CHRONIC PAIN OF RIGHT KNEE: ICD-10-CM

## 2023-12-05 DIAGNOSIS — M54.14 THORACIC RADICULOPATHY: Chronic | ICD-10-CM

## 2023-12-05 DIAGNOSIS — M54.16 LUMBAR RADICULOPATHY: Chronic | ICD-10-CM

## 2023-12-05 DIAGNOSIS — M47.812 SPONDYLOSIS OF CERVICAL JOINT WITHOUT MYELOPATHY: Chronic | ICD-10-CM

## 2023-12-05 RX ORDER — HYDROCODONE BITARTRATE AND ACETAMINOPHEN 7.5; 325 MG/1; MG/1
1 TABLET ORAL EVERY 8 HOURS PRN
Qty: 90 TABLET | Refills: 0 | Status: SHIPPED | OUTPATIENT
Start: 2023-12-15 | End: 2024-01-11 | Stop reason: SDUPTHER

## 2024-01-10 DIAGNOSIS — R13.10 DYSPHAGIA: Primary | ICD-10-CM

## 2024-01-11 DIAGNOSIS — G89.29 CHRONIC PAIN OF RIGHT KNEE: ICD-10-CM

## 2024-01-11 DIAGNOSIS — M54.16 LUMBAR RADICULOPATHY: Chronic | ICD-10-CM

## 2024-01-11 DIAGNOSIS — M54.14 THORACIC RADICULOPATHY: Chronic | ICD-10-CM

## 2024-01-11 DIAGNOSIS — M25.561 CHRONIC PAIN OF RIGHT KNEE: ICD-10-CM

## 2024-01-11 DIAGNOSIS — M47.812 SPONDYLOSIS OF CERVICAL JOINT WITHOUT MYELOPATHY: Chronic | ICD-10-CM

## 2024-01-11 RX ORDER — HYDROCODONE BITARTRATE AND ACETAMINOPHEN 7.5; 325 MG/1; MG/1
1 TABLET ORAL EVERY 8 HOURS PRN
Qty: 90 TABLET | Refills: 0 | Status: SHIPPED | OUTPATIENT
Start: 2024-01-19 | End: 2024-01-22 | Stop reason: SDUPTHER

## 2024-01-11 NOTE — TELEPHONE ENCOUNTER
----- Message from Kerry Caldera sent at 1/11/2024 10:35 AM CST -----  Regarding: rx  Pt missed last visit in Dec due to being sick in the hospital pt next visit is scheduled for 1-25-24 pt is out of meds and is requesting a refill please call pt back at [phone]

## 2024-01-22 NOTE — PROGRESS NOTES
Subjective:         Patient ID: Sharee Elizondo is a 84 y.o. female.    Chief Complaint: Neck Pain        Pain  This is a chronic problem. The current episode started more than 1 year ago. The problem occurs daily. The problem has been waxing and waning. Associated symptoms include arthralgias and neck pain. Pertinent negatives include no anorexia, chest pain, coughing, diaphoresis, fever, sore throat, urinary symptoms, vertigo or vomiting.     Review of Systems   Constitutional:  Negative for activity change, appetite change, diaphoresis, fever and unexpected weight change.   HENT:  Negative for drooling, ear discharge, ear pain, facial swelling, mouth dryness, nosebleeds, sore throat, trouble swallowing, voice change and goiter.    Eyes:  Negative for photophobia, pain, discharge, redness and visual disturbance.   Respiratory:  Negative for apnea, cough, choking, chest tightness, shortness of breath, wheezing and stridor.    Cardiovascular:  Negative for chest pain, palpitations and leg swelling.   Gastrointestinal:  Negative for abdominal distention, anorexia, diarrhea, rectal pain, vomiting and fecal incontinence.   Endocrine: Negative for cold intolerance, heat intolerance, polydipsia, polyphagia and polyuria.   Genitourinary:  Negative for bladder incontinence, dysuria, flank pain, frequency and hot flashes.   Musculoskeletal:  Positive for arthralgias, back pain, leg pain and neck pain.   Integumentary:  Negative for color change and pallor.   Allergic/Immunologic: Negative for immunocompromised state.   Neurological:  Negative for dizziness, vertigo, seizures, syncope, facial asymmetry, speech difficulty, light-headedness, memory loss and coordination difficulties.   Hematological:  Negative for adenopathy. Does not bruise/bleed easily.   Psychiatric/Behavioral:  Negative for agitation, behavioral problems, confusion, decreased concentration, dysphoric mood, hallucinations, self-injury and suicidal ideas. The  patient is not nervous/anxious and is not hyperactive.            Past Medical History:   Diagnosis Date    Acute superficial gastritis without hemorrhage 02/18/2022    Atrial fibrillation     Automatic implantable cardiac defibrillator in situ     Bilateral primary osteoarthritis of knee     Cervical spondylosis without myelopathy     CHF (congestive heart failure)     Chronic ischemic heart disease     Chronic kidney disease (CKD), stage III (moderate)     Chronic pain syndrome     COPD (chronic obstructive pulmonary disease)     Coronary arteriosclerosis     Depressive disorder     Esophageal dysphagia 02/18/2022    GERD (gastroesophageal reflux disease)     Heart attack     may 2023    HH (hiatus hernia) 02/18/2022    Hypothyroidism     Low back pain     Lumbar radiculopathy     Melanoma 2019    scalp    Metabolic syndrome     Myocardial ischemia     Other long term (current) drug therapy     Radiculopathy, cervical region     Type 2 diabetes mellitus      Past Surgical History:   Procedure Laterality Date    BACK SURGERY      bilateral knee injection/ aspiration of joint/ bursa-large  Bilateral 4/4/2017 3/27/2017 3/20/2017    bilateral knee orthovisc injection    CARDIAC CATHETERIZATION      CARDIAC SURGERY      EPIDURAL STEROID INJECTION INTO CERVICAL SPINE  6/19/2019 5/17/2017 4/26/2017    C6-7 LONNIEDr Farah    EPIDURAL STEROID INJECTION INTO LUMBAR SPINE  03/15/2018    L4-5 Dr Gagan FLEMING    HYSTERECTOMY      INJECTION OF ANESTHETIC AGENT AROUND MEDIAL BRANCH NERVES INNERVATING LUMBAR FACET JOINT Bilateral 04/14/2022    Procedure: Block-nerve-medial branch-lumbar, bilateral L4 through S1;  Surgeon: Angie Farah MD;  Location: [facility];  Service: Pain Management;  Laterality: Bilateral;  pt aware at visit to be tested  4/12 patient getting covid test today    INJECTION OF FACET JOINT Bilateral 8/17/2017 7/19/2017    Bilateral C3-7 Dr Gagan MARROQUIN    INJECTION OF FACET JOINT Left 10/18/2018     "left C3-7 FI, Dr Farah    INJECTION OF FACET JOINT Bilateral 8/22/2018 6/29/2018    bilateral L3-S1 FI, Dr Farah    RADIOFREQUENCY ABLATION Right 12/12/2018    Right C3-7 RF, Dr Farah    RADIOFREQUENCY ABLATION Left 01/9/2019 10/16/2017 10/7/2020    Left C3-7 RF, Dr Farah    RADIOFREQUENCY ABLATION Left 1/9/2019 10/16/2017    left C3-7 RF, Dr Farah    RADIOFREQUENCY THERMAL COAGULATION OF MEDIAL BRANCH OF POSTERIOR RAMUS OF CERVICAL SPINAL NERVE Left 08/05/2021    Procedure: RADIOFREQUENCY THERMAL COAGULATION, NERVE, SPINAL, CERVICAL, POSTERIOR RAMUS, MEDIAL BRANCH, Left C3-4,4-5  only 2 levels resubmitted dt denied all levels on 8-3-2021;  Surgeon: Angie Farah MD;  Location: [facility];  Service: Pain Management;  Laterality: Left;    RADIOFREQUENCY THERMAL COAGULATION OF MEDIAL BRANCH OF POSTERIOR RAMUS OF CERVICAL SPINAL NERVE Right 08/24/2021    Procedure: RADIOFREQUENCY THERMAL COAGULATION, NERVE, SPINAL, CERVICAL, POSTERIOR RAMUS, MEDIAL BRANCH RIGHT C3-C5 RFTC;  Surgeon: Angie Farah MD;  Location: [facility];  Service: Pain Management;  Laterality: Right;  HAD VAC WILL BRING CARD    TRANSFORAMINAL EPIDURAL INJECTION OF STEROID Right 5/30/2018 5/2/2018    Right L4-5 TFESI, Dr Farah     Social History     Socioeconomic History    Marital status:    Tobacco Use    Smoking status: Every Day     Current packs/day: 0.50     Average packs/day: 0.5 packs/day for 40.0 years (20.0 ttl pk-yrs)     Types: Cigarettes     Passive exposure: Current    Smokeless tobacco: Never   Substance and Sexual Activity    Alcohol use: Not Currently    Drug use: Never     Family History   Problem Relation Age of Onset    Hypertension Mother     Diabetes Mellitus Mother      Review of patient's allergies indicates:  No Known Allergies     Objective:  Vitals:    01/25/24 0806   BP: 121/86   Pulse: 82   Resp: 16   Weight: 55.8 kg (123 lb)   Height: 5' 3" (1.6 m)   PainSc:   8           Physical " Exam  Vitals and nursing note reviewed. Exam conducted with a chaperone present.   Constitutional:       General: She is awake. She is not in acute distress.     Appearance: Normal appearance. She is not ill-appearing or toxic-appearing.   HENT:      Head: Normocephalic and atraumatic.      Nose: Nose normal.      Mouth/Throat:      Mouth: Mucous membranes are moist.      Pharynx: Oropharynx is clear.   Eyes:      Conjunctiva/sclera: Conjunctivae normal.      Pupils: Pupils are equal, round, and reactive to light.   Pulmonary:      Effort: Pulmonary effort is normal. No respiratory distress.   Abdominal:      Palpations: Abdomen is soft.      Tenderness: There is no guarding.   Musculoskeletal:         General: Normal range of motion.      Cervical back: Normal range of motion and neck supple. Tenderness present. No rigidity.      Thoracic back: Tenderness present.      Lumbar back: Tenderness present.   Skin:     General: Skin is warm and dry.      Coloration: Skin is not jaundiced or pale.   Neurological:      General: No focal deficit present.      Mental Status: She is alert and oriented to person, place, and time. Mental status is at baseline.      Cranial Nerves: No cranial nerve deficit (II-XII).      Gait: Gait abnormal.   Psychiatric:         Mood and Affect: Mood normal.         Behavior: Behavior normal. Behavior is cooperative.         Thought Content: Thought content normal.           EGD w Dilation  Narrative: Table formatting from the original result was not included.  Procedure Date  1/23/24    Impression  Overall   Impression:    Dilated in the esophagus  Erythematous mucosa in the fundus of the stomach and antrum  Small hiatal hernia  Dilated in the esophagus with Bejarano dilator  Mucosa of the esophagus is normal appearing. The esophagus was dilated   with a 48 F Bejarano dilator. A small hiatus hernia was noted and gastritis   was present. No biopsies were obtained due to Brillinta therapy. Mucosa  of   the duodenum was normal appearing.    Recommendation   Follow up with PCP     Avoid nsaids; resume PPI or H2RA; repeat esophageal dilation prn.  Discharge: disp: DC to home. Resume diet. No driving x 24 hours. Follow-up   with PCP as scheduled.  Dx: esophageal dysphagia, hiatus hernia, gastritis, s/p dilation of the   esophagus    Indication  Dysphagia    Providers  Bret Cevallos, RN Registered Nurse   Kelsey Pereira Technician   Gage, JAE Lyons CRNA, Vincent C., MD Proceduralist     Medications  Moderate sedation administered by anesthesia staff - See anesthesia   record.    Preprocedure  A history and physical has been performed, and patient medication   allergies have been reviewed. The patient's tolerance of previous   anesthesia has been reviewed. The risks and benefits of the procedure and   the sedation options and risks were discussed with the patient. All   questions were answered and informed consent obtained.    ASA Score: ASA 3 - Patient with moderate systemic disease with functional   limitations  Mallampati Airway Score: II (hard and soft palate, upper portion of   tonsils anduvula visible)    Details of the Procedure  The patient underwent monitored anesthesia care, which was administered by   an anesthesia professional. The patient's blood pressure, heart rate,   level of consciousness, oxygen, respirations, ECG and ETCO2 were monitored   throughout the procedure. The scope was introduced through the mouth and   advanced to the third part of the duodenum. Retroflexion was performed in   the cardia. Prior to the procedure, the patient's H. Pylori status was   unknown. The patient's estimated blood loss was minimal (<5 mL). The   procedure was not difficult. The patient tolerated the procedure well.   There were no apparent adverse events.     Scope: Gastroscope  Scope Serial: [id number]    Events  Procedure Events   Event Event Time     Procedure Events   Event Event Time    SCOPE IN 1/23/2024 10:49 AM   SCOPE OUT 1/23/2024 10:51 AM     Findings  Dilated in the esophagus from 48 mm starting size  Erythematous mucosa in the fundus of the stomach and antrum  Small hiatal hernia  Dilated in the esophagus with [facility] dilator         [facility] Outpatient Visit on 01/23/2024   Component Date Value Ref Range Status    POC Glucose 01/23/2024 218 (A)  70 - 110 MG/DL Final   Office Visit on 10/16/2023   Component Date Value Ref Range Status    Hemoglobin A1C 10/16/2023 7.2 (H)  4.5 - 6.6 % Final    Estimated Average Glucose 10/16/2023 154  mg/dL Final    Sodium 10/16/2023 137  136 - 145 mmol/L Final    Potassium 10/16/2023 4.3  3.5 - 5.1 mmol/L Final    Chloride 10/16/2023 104  98 - 107 mmol/L Final    CO2 10/16/2023 27  21 - 32 mmol/L Final    Anion Gap 10/16/2023 10  7 - 16 mmol/L Final    Glucose 10/16/2023 133 (H)  74 - 106 mg/dL Final    BUN 10/16/2023 29 (H)  7 - 18 mg/dL Final    Creatinine 10/16/2023 1.15 (H)  0.55 - 1.02 mg/dL Final    BUN/Creatinine Ratio 10/16/2023 25 (H)  6 - 20 Final    Calcium 10/16/2023 8.7  8.5 - 10.1 mg/dL Final    Total Protein 10/16/2023 6.9  6.4 - 8.2 g/dL Final    Albumin 10/16/2023 3.5  3.5 - 5.0 g/dL Final    Globulin 10/16/2023 3.4  2.0 - 4.0 g/dL Final    A/G Ratio 10/16/2023 1.0   Final    Bilirubin, Total 10/16/2023 0.3  >0.0 - 1.2 mg/dL Final    Alk Phos 10/16/2023 113  55 - 142 U/L Final    ALT 10/16/2023 15  13 - 56 U/L Final    AST 10/16/2023 10 (L)  15 - 37 U/L Final    eGFR 10/16/2023 47 (L)  >=60 mL/min/1.73m2 Final   Office Visit on 08/28/2023   Component Date Value Ref Range Status    POC Amphetamines 08/28/2023 Negative  Negative, Inconclusive Final    POC Barbiturates 08/28/2023 Negative  Negative, Inconclusive Final    POC Benzodiazepines 08/28/2023 Negative  Negative, Inconclusive Final    POC Cocaine 08/28/2023 Negative  Negative, Inconclusive Final    POC THC 08/28/2023 Negative  Negative, Inconclusive Final    POC Methadone 08/28/2023  Negative  Negative, Inconclusive Final    POC Methamphetamine 08/28/2023 Negative  Negative, Inconclusive Final    POC Opiates 08/28/2023 Presumptive Positive (A)  Negative, Inconclusive Final    POC Oxycodone 08/28/2023 Negative  Negative, Inconclusive Final    POC Phencyclidine 08/28/2023 Negative  Negative, Inconclusive Final    POC Methylenedioxymethamphetamine * 08/28/2023 Negative  Negative, Inconclusive Final    POC Tricyclic Antidepressants 08/28/2023 Negative  Negative, Inconclusive Final    POC Buprenorphine 08/28/2023 Negative   Final     Acceptable 08/28/2023 Yes   Final    POC Temperature (Urine) 08/28/2023 92   Final   Hospital Outpatient Visit on 08/23/2023   Component Date Value Ref Range Status    POC Glucose 08/23/2023 114 (A)  70 - 110 took with own meter at 9 am Final         Orders Placed This Encounter   Procedures    POCT Urine Drug Screen Presump     Interpretive Information:     Negative:  No drug detected at the cut off level.   Positive:  This result represents presumptive positive for the   tested drug, other substances may yield a positive response other   than the analyte of interest. This result should be utilized for   diagnostic purpose only. Confirmation testing will be performed upon physician request only.            Requested Prescriptions     Signed Prescriptions Disp Refills    gabapentin (NEURONTIN) 100 MG capsule 270 capsule 2     Sig: Take 1 capsule (100 mg total) by mouth every 8 (eight) hours.    HYDROcodone-acetaminophen (NORCO) 7.5-325 mg per tablet 90 tablet 0     Sig: Take 1 tablet by mouth every 8 (eight) hours as needed for Pain.       Assessment:     1. 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 Osteoarthrosis multiple sites, not specified as generalized    3. Chronic pain of right knee    4. Lumbar radiculopathy    5. Thoracic radiculopathy    6. Encounter for long-term (current) use of other medications           A's of Opioid Risk  Assessment  Activity:Patient can perform ADL.   Analgesia:Patients pain is partially controlled by current medication. Patient has tried OTC medications such as Tylenol and Ibuprofen with out relief.   Adverse Effects: Patient denies constipation or sedation.  Aberrant Behavior:  reviewed with no aberrant drug seeking/taking behavior.  Overdose reversal drug naloxone discussed    Drug screen reviewed          X-ray bilateral knee [facility] September 4, 2020 degenerative changes noted no fracture noted    CT thoracic spine degenerative changes throughout the thoracic spine radiologist did find a noncalcified 5-6 mm lesion left lower lung area recommended follow-up with CT chest 6 months   Primary care provider following this    CT cervical spine [facility] April 3, 2023, multiple level degenerative changes no high-grade spinal canal narrowing neuroforaminal narrowing greatest left C5/6 severe mild-to-moderate anterior wedging C5 vertebral body             Plan:    Pharmacy closed on weekends     February 17 2024 next prescription     Use February 18 for refill date next visit        Narcan November 2022    History lumbar surgery 2005, Dr. Woods [facility]    August 2023 Cardiology declined holding anticoagulant after cardiac stent placement    Pharmacy closed on weekends       Cardiac defibrillator cannot have MRI    Anticoagulated cardiac stent placement    Canceled cervical RF TC August 2023 can not hold anticoagulant    Complaint cervical spine discomfort worse with flexion extension rotation cervical spine ongoing for more than 3 months pain is facet joint in nature    Requesting that we reschedule her procedure she states she believes her cardiologist like her to hold her anticoagulant at this time    Continue home exercise program as directed    Continue current medication    Indications for this procedure for this specific patient include the following   - Pt has had symptoms for three months  with moderate to severe pain with functional impairment rated of 7/10 pain. Pain is severe enough to affect her quality of life and function  - Pain non-responsive to conservative care.    - Pain predominately axial and not associated with radiculopathy or claudication.    - No non-facet pathology as source of pain.    - Clinical assessment implicates facet joint as putative pain source.    - facet loading maneuver positive  - Pain is exacerbated by extension or prolonged sitting/standing and relieved by rest.    - No unexplained neurologic deficit.    - No history of coagulopathy, infection or unstable medical conditions.    - Pain is causing significant functional limitation resulting in diminished quality of life and impaired age appropriate ADL's.   - Clinical assessment implicates facet joint as putative source of pain  - Repeat injections not done prior to 7 days   - no more than 2 levels will be done    -procedure done prior to surgical consideration  The planned medically necessary  surgical procedure is performed in a hospital outpatient department and not in an ambulatory surgical center due to:     -there is no geographically assessable ambulatory surgery center that has the  necessary equipment and fluoroscopy needed for the procedure     -there is no geographically assessable ambulatory surgical center available at which the physician has privileges     -an ASC's  specific  guideline regarding the individuals weight or health conditions that prevent the use of an ASC     -Medial branch block performed in consideration for RFTC, not just for therapeutic treatment    -done under fluoro    Monitor anesthesia request is medically indicated for the scheduled nerve block procedure due to:  1- needle phobia and anxiety, placing  the patient at risk during the provided service.  2-patient has an ASA class greater than 3 and requires constant presence of an anesthesiologist during the procedure,   3-patient has  severe problems hard to lie still  4-patient suffers from chronic pain and is unable to function due to  diminished ADLs    Schedule bilateral cervical C4 through 6 RF TC, cervical spondylosis    Dr. Farah November 2023    Bring original prescription medication bottles/container/box with labels to each visit

## 2024-01-23 ENCOUNTER — ANESTHESIA (OUTPATIENT)
Dept: GASTROENTEROLOGY | Facility: HOSPITAL | Age: 84
End: 2024-01-23
Payer: MEDICARE

## 2024-01-23 ENCOUNTER — ANESTHESIA EVENT (OUTPATIENT)
Dept: GASTROENTEROLOGY | Facility: HOSPITAL | Age: 84
End: 2024-01-23
Payer: MEDICARE

## 2024-01-23 ENCOUNTER — HOSPITAL ENCOUNTER (OUTPATIENT)
Dept: GASTROENTEROLOGY | Facility: HOSPITAL | Age: 84
Discharge: HOME | End: 2024-01-23
Attending: INTERNAL MEDICINE
Payer: MEDICARE

## 2024-01-23 VITALS
HEART RATE: 77 BPM | OXYGEN SATURATION: 98 % | RESPIRATION RATE: 11 BRPM | SYSTOLIC BLOOD PRESSURE: 101 MMHG | DIASTOLIC BLOOD PRESSURE: 62 MMHG

## 2024-01-23 DIAGNOSIS — K44.9 HH (HIATUS HERNIA): ICD-10-CM

## 2024-01-23 DIAGNOSIS — K29.00 ACUTE SUPERFICIAL GASTRITIS WITHOUT HEMORRHAGE: ICD-10-CM

## 2024-01-23 DIAGNOSIS — R13.19 ESOPHAGEAL DYSPHAGIA: Primary | ICD-10-CM

## 2024-01-23 DIAGNOSIS — R13.10 DYSPHAGIA: ICD-10-CM

## 2024-01-23 LAB — GLUCOSE SERPL-MCNC: 218 MG/DL (ref 70–110)

## 2024-01-23 PROCEDURE — 37000008 HC ANESTHESIA 1ST 15 MINUTES

## 2024-01-23 PROCEDURE — 25000003 PHARM REV CODE 250: Performed by: ANESTHESIOLOGY

## 2024-01-23 PROCEDURE — 82962 GLUCOSE BLOOD TEST: CPT

## 2024-01-23 PROCEDURE — D9220A PRA ANESTHESIA: Mod: ,,, | Performed by: ANESTHESIOLOGY

## 2024-01-23 PROCEDURE — 43450 DILATE ESOPHAGUS 1/MULT PASS: CPT | Mod: ,,, | Performed by: INTERNAL MEDICINE

## 2024-01-23 PROCEDURE — 43235 EGD DIAGNOSTIC BRUSH WASH: CPT | Mod: ,,, | Performed by: INTERNAL MEDICINE

## 2024-01-23 PROCEDURE — 43450 DILATE ESOPHAGUS 1/MULT PASS: CPT | Performed by: INTERNAL MEDICINE

## 2024-01-23 PROCEDURE — 43235 EGD DIAGNOSTIC BRUSH WASH: CPT | Performed by: INTERNAL MEDICINE

## 2024-01-23 PROCEDURE — 37000009 HC ANESTHESIA EA ADD 15 MINS

## 2024-01-23 RX ORDER — SODIUM CHLORIDE 9 MG/ML
INJECTION, SOLUTION INTRAVENOUS CONTINUOUS PRN
Status: DISCONTINUED | OUTPATIENT
Start: 2024-01-23 | End: 2024-01-23

## 2024-01-23 RX ORDER — LIDOCAINE HYDROCHLORIDE 20 MG/ML
INJECTION, SOLUTION EPIDURAL; INFILTRATION; INTRACAUDAL; PERINEURAL
Status: DISCONTINUED | OUTPATIENT
Start: 2024-01-23 | End: 2024-01-23

## 2024-01-23 RX ORDER — SODIUM CHLORIDE 0.9 % (FLUSH) 0.9 %
10 SYRINGE (ML) INJECTION
Status: DISCONTINUED | OUTPATIENT
Start: 2024-01-23 | End: 2024-01-24 | Stop reason: HOSPADM

## 2024-01-23 RX ORDER — PROPOFOL 10 MG/ML
VIAL (ML) INTRAVENOUS
Status: DISCONTINUED | OUTPATIENT
Start: 2024-01-23 | End: 2024-01-23

## 2024-01-23 RX ADMIN — LIDOCAINE HYDROCHLORIDE 50 MG: 20 INJECTION, SOLUTION INTRAVENOUS at 10:01

## 2024-01-23 RX ADMIN — Medication 50 MG: at 10:01

## 2024-01-23 RX ADMIN — SODIUM CHLORIDE: 9 INJECTION, SOLUTION INTRAVENOUS at 10:01

## 2024-01-23 NOTE — H&P
[facility]  Gastroenterology  H&P    Patient Name: Sharee Elizondo  MRN: [id number]  Admission Date: 1/23/2024  Code Status: Prior    Attending Provider: Michael Long MD   Primary Care Physician: Hollis Rice MD  Principal Problem:<principal problem not specified>    Subjective:     History of Present Illness: Pt c/o esophageal dysphagia; for egd with dilation of the esophagus.    Past Medical History:   Diagnosis Date    Acute superficial gastritis without hemorrhage 02/18/2022    Atrial fibrillation     Automatic implantable cardiac defibrillator in situ     Bilateral primary osteoarthritis of knee     Cervical spondylosis without myelopathy     CHF (congestive heart failure)     Chronic ischemic heart disease     Chronic kidney disease (CKD), stage III (moderate)     Chronic pain syndrome     COPD (chronic obstructive pulmonary disease)     Coronary arteriosclerosis     Depressive disorder     Esophageal dysphagia 02/18/2022    GERD (gastroesophageal reflux disease)     Heart attack     may 2023    HH (hiatus hernia) 02/18/2022    Hypothyroidism     Low back pain     Lumbar radiculopathy     Melanoma 2019    scalp    Metabolic syndrome     Myocardial ischemia     Other long term (current) drug therapy     Radiculopathy, cervical region     Type 2 diabetes mellitus        Past Surgical History:   Procedure Laterality Date    BACK SURGERY      bilateral knee injection/ aspiration of joint/ bursa-large  Bilateral 4/4/2017 3/27/2017 3/20/2017    bilateral knee orthovisc injection    CARDIAC CATHETERIZATION      CARDIAC SURGERY      EPIDURAL STEROID INJECTION INTO CERVICAL SPINE  6/19/2019 5/17/2017 4/26/2017    C6-7 Dr Gagan FLEMING    EPIDURAL STEROID INJECTION INTO LUMBAR SPINE  03/15/2018    L4-5 Dr Gagan FLEMING    HYSTERECTOMY      INJECTION OF ANESTHETIC AGENT AROUND MEDIAL BRANCH NERVES INNERVATING LUMBAR FACET JOINT Bilateral 04/14/2022    Procedure: Block-nerve-medial branch-lumbar, bilateral L4  through S1;  Surgeon: Angie Farah MD;  Location: [facility];  Service: Pain Management;  Laterality: Bilateral;  pt aware at visit to be tested  4/12 patient getting covid test today    INJECTION OF FACET JOINT Bilateral 8/17/2017 7/19/2017    Bilateral C3-7 FI, Dr Farah    INJECTION OF FACET JOINT Left 10/18/2018    left C3-7 FI, Dr Farah    INJECTION OF FACET JOINT Bilateral 8/22/2018 6/29/2018    bilateral L3-S1 FI, Dr Farah    RADIOFREQUENCY ABLATION Right 12/12/2018    Right C3-7 RF, Dr Farah    RADIOFREQUENCY ABLATION Left 01/9/2019 10/16/2017 10/7/2020    Left C3-7 RF, Dr Farah    RADIOFREQUENCY ABLATION Left 1/9/2019 10/16/2017    left C3-7 RF, Dr Farah    RADIOFREQUENCY THERMAL COAGULATION OF MEDIAL BRANCH OF POSTERIOR RAMUS OF CERVICAL SPINAL NERVE Left 08/05/2021    Procedure: RADIOFREQUENCY THERMAL COAGULATION, NERVE, SPINAL, CERVICAL, POSTERIOR RAMUS, MEDIAL BRANCH, Left C3-4,4-5  only 2 levels resubmitted dt denied all levels on 8-3-2021;  Surgeon: Angie Farah MD;  Location: [facility];  Service: Pain Management;  Laterality: Left;    RADIOFREQUENCY THERMAL COAGULATION OF MEDIAL BRANCH OF POSTERIOR RAMUS OF CERVICAL SPINAL NERVE Right 08/24/2021    Procedure: RADIOFREQUENCY THERMAL COAGULATION, NERVE, SPINAL, CERVICAL, POSTERIOR RAMUS, MEDIAL BRANCH RIGHT C3-C5 RFTC;  Surgeon: Angie Farah MD;  Location: [facility];  Service: Pain Management;  Laterality: Right;  HAD VAC WILL BRING CARD    TRANSFORAMINAL EPIDURAL INJECTION OF STEROID Right 5/30/2018 5/2/2018    Right L4-5 TFESI, Dr Farah       Review of patient's allergies indicates:  No Known Allergies  Family History       Problem Relation (Age of Onset)    Diabetes Mellitus Mother    Hypertension Mother          Tobacco Use    Smoking status: Every Day     Current packs/day: 0.50     Average packs/day: 0.5 packs/day for 40.0 years (20.0 ttl pk-yrs)     Types: Cigarettes     Passive exposure: Current     Smokeless tobacco: Never   Substance and Sexual Activity    Alcohol use: Not Currently    Drug use: Never    Sexual activity: Not on file     Review of Systems   HENT:  Positive for trouble swallowing.    Respiratory: Negative.     Cardiovascular: Negative.    Gastrointestinal: Negative.      Objective:     Vital Signs (Most Recent):  Pulse: 85 (01/23/24 1009)  Resp: 15 (01/23/24 1009)  BP: (!) 122/54 (01/23/24 1009)  SpO2: 97 % (01/23/24 1009) Vital Signs (24h Range):  Pulse:  [85] 85  Resp:  [15] 15  SpO2:  [97 %] 97 %  BP: (122)/(54) 122/54        There is no height or weight on file to calculate BMI.    No intake or output data in the 24 hours ending 01/23/24 1035    Lines/Drains/Airways       Peripheral Intravenous Line  Duration                  Peripheral IV - Single Lumen 01/23/24 1010 22 G Anterior;Right Forearm <1 day                    Physical Exam  Vitals reviewed.   Constitutional:       General: She is not in acute distress.     Appearance: Normal appearance. She is well-developed. She is not ill-appearing.   HENT:      Head: Normocephalic and atraumatic.      Nose: Nose normal.   Eyes:      Pupils: Pupils are equal, round, and reactive to light.   Cardiovascular:      Rate and Rhythm: Normal rate and regular rhythm.   Pulmonary:      Effort: Pulmonary effort is normal.      Breath sounds: Normal breath sounds. No wheezing.   Abdominal:      General: Abdomen is flat. Bowel sounds are normal. There is no distension.      Palpations: Abdomen is soft.      Tenderness: There is no abdominal tenderness. There is no guarding.   Skin:     General: Skin is warm and dry.      Coloration: Skin is not jaundiced.   Neurological:      Mental Status: She is alert.   Psychiatric:         Attention and Perception: Attention normal.         Mood and Affect: Affect normal.         Speech: Speech normal.         Behavior: Behavior is cooperative.      Comments: Pt was calm while speaking.         Significant Labs:  CBC:  "No results for input(s): "WBC", "HGB", "HCT", "PLT" in the last 48 hours.  CMP: No results for input(s): "GLU", "CALCIUM", "ALBUMIN", "PROT", "NA", "K", "CO2", "CL", "BUN", "CREATININE", "ALKPHOS", "ALT", "AST", "BILITOT" in the last 48 hours.    Significant Imaging:  Imaging results within the past 24 hours have been reviewed.    Assessment/Plan:     There are no hospital problems to display for this patient.        Imp: esophageal dysphagia  Plan: egd with dilation of the esophagus    Michael Long MD  Gastroenterology  [facility]  "

## 2024-01-23 NOTE — ANESTHESIA PREPROCEDURE EVALUATION
01/23/2024  Sharee Elizondo is a 84 y.o., female.      Pre-op Assessment    I have reviewed the Patient Summary Reports.     I have reviewed the Nursing Notes. I have reviewed the NPO Status.   I have reviewed the Medications.     Review of Systems  Anesthesia Hx:  No problems with previous Anesthesia                Social:  No Alcohol Use, Smoker       Cardiovascular:    Pacemaker   Past MI CAD   CABG/stent Dysrhythmias atrial fibrillation  CHF        Cabg 2006  Stent may 2023  H/O afib                         Pulmonary:   COPD      2L NC at night               Renal/:  Chronic Renal Disease, CKD                Hepatic/GI:    Hiatal Hernia, GERD             Musculoskeletal:  Arthritis               Neurological:    Neuromuscular Disease,             Chronic Pain Syndrome                         Endocrine:  Diabetes, well controlled Hypothyroidism  dexcom        Psych:  Psychiatric History                Past Medical History:   Diagnosis Date    Acute superficial gastritis without hemorrhage 02/18/2022    Atrial fibrillation     Automatic implantable cardiac defibrillator in situ     Bilateral primary osteoarthritis of knee     Cervical spondylosis without myelopathy     CHF (congestive heart failure)     Chronic ischemic heart disease     Chronic kidney disease (CKD), stage III (moderate)     Chronic pain syndrome     COPD (chronic obstructive pulmonary disease)     Coronary arteriosclerosis     Depressive disorder     Esophageal dysphagia 02/18/2022    GERD (gastroesophageal reflux disease)     Heart attack     may 2023    HH (hiatus hernia) 02/18/2022    Hypothyroidism     Low back pain     Lumbar radiculopathy     Melanoma 2019    scalp    Metabolic syndrome     Myocardial ischemia     Other long term (current) drug therapy     Radiculopathy, cervical region     Type 2 diabetes mellitus       Current  Outpatient Medications on File Prior to Encounter   Medication Sig Dispense Refill    albuterol (PROVENTIL/VENTOLIN HFA) 90 mcg/actuation inhaler Inhale 1 puff into the lungs every 6 (six) hours as needed for Wheezing. Rescue 8 g 0    amitriptyline (ELAVIL) 25 MG tablet Take 1 tablet (25 mg total) by mouth nightly as needed for Insomnia. 90 tablet 1    ascorbic acid, vitamin C, (VITAMIN C) 500 MG tablet Take 500 mg by mouth once daily.      aspirin 81 mg Cap Take 81 mg by mouth Daily.      blood sugar diagnostic (TRUE METRIX GLUCOSE TEST STRIP) [facility] TEST BLOOD SUGAR TWICE DAILY FOR DIABETES 200 strip 5    blood-glucose meter Misc Use to check blood glucose twice daily for diabetes, E11.9 1 each 0    carvediloL (COREG) 3.125 MG tablet Take 1 tablet (3.125 mg total) by mouth 2 (two) times daily with meals. 180 tablet 1    cholecalciferol, vitamin D3, (VITAMIN D3) 25 mcg (1,000 unit) capsule Take 2,000 Units by mouth 2 (two) times daily.      digoxin (LANOXIN) 125 mcg tablet Take 125 mcg by mouth once daily.      dulaglutide (TRULICITY) 1.5 mg/0.5 mL pen injector Inject 1.5 mg into the skin every 7 days. 12 pen 1    esomeprazole (NEXIUM) 40 MG capsule Take 1 capsule (40 mg total) by mouth once daily. 90 capsule 1    FARXIGA 10 mg tablet Take 10 mg by mouth.      flash glucose scanning reader (FREESTYLE DESI 2 READER) [facility] [place] Use for checking blood sugar as needed. 1 each 5    flash glucose sensor (FREESTYLE DESI 2 SENSOR) Kit Use to check blood sugar daily as needed for 2 weeks 3 kit 5    fluticasone propionate (FLONASE) 50 mcg/actuation nasal spray 2 sprays (100 mcg total) by Each Nostril route once daily. 48 g 2    furosemide (LASIX) 40 MG tablet Take 1 tablet (40 mg total) by mouth once daily. 90 tablet 1    gabapentin (NEURONTIN) 100 MG capsule Take 1 capsule (100 mg total) by mouth every 8 (eight) hours. 270 capsule 2    HYDROcodone-acetaminophen (NORCO) 7.5-325 mg per tablet Take 1 tablet by mouth every 8 (eight)  "hours as needed for Pain. 90 tablet 0    HYDROcodone-acetaminophen (NORCO) 7.5-325 mg per tablet Take 1 tablet by mouth every 8 (eight) hours as needed for Pain. 90 tablet 0    HYDROcodone-acetaminophen (NORCO) 7.5-325 mg per tablet Take 1 tablet by mouth every 8 (eight) hours as needed for Pain. 90 tablet 0    icosapent ethyL (VASCEPA) 0.5 gram Cap Take 1 capsule by mouth 2 (two) times a day. 180 capsule 1    insulin degludec (TRESIBA FLEXTOUCH U-100) 100 unit/mL (3 mL) insulin pen Inject 14 Units into the skin every morning. 4.2 mL 11    isosorbide mononitrate (IMDUR) 30 MG 24 hr tablet Take 30 mg by mouth.      lancets (TRUEPLUS LANCETS) 33 gauge Misc Use with meter daily 100 each 5    levothyroxine (SYNTHROID) 88 MCG tablet Take 88 mcg by mouth before breakfast.      methocarbamoL (ROBAXIN) 500 MG Tab Take 1 tablet (500 mg total) by mouth 2 (two) times daily. 180 tablet 1    montelukast (SINGULAIR) 10 mg tablet Take 1 tablet (10 mg total) by mouth every evening. 90 tablet 1    nitroGLYCERIN (NITROSTAT) 0.4 MG SL tablet Place 1 tablet (0.4 mg total) under the tongue every 5 (five) minutes as needed for Chest pain. 30 tablet 2    pen needle, diabetic 32 gauge x 1/4" Ndle Use daily to check blood glucose 100 each 2    potassium chloride SA (K-DUR,KLOR-CON) 20 MEQ tablet Take 20 mEq by mouth.      prasugreL (EFFIENT) 10 mg Tab Take 10 mg by mouth.      rosuvastatin (CRESTOR) 40 MG Tab Take 1 tablet (40 mg total) by mouth every evening. 90 tablet 1    sertraline (ZOLOFT) 50 MG tablet Take 1 tablet (50 mg total) by mouth once daily. 90 tablet 1     No current facility-administered medications on file prior to encounter.        Physical Exam  General: Cooperative, Alert and Oriented    Airway:  Mallampati: II   Mouth Opening: Normal  TM Distance: Normal  Tongue: Normal  Neck ROM: Extension Painful, Left Lateral Motion Decreased, Right Lateral Motion Decreased        Anesthesia Plan  Type of Anesthesia, risks & benefits " discussed:    Anesthesia Type: Gen Natural Airway  Intra-op Monitoring Plan: Standard ASA Monitors  Post Op Pain Control Plan: multimodal analgesia  Induction:  IV  Informed Consent: Informed consent signed with the Patient and all parties understand the risks and agree with anesthesia plan.  All questions answered. Patient consented to blood products? Yes  ASA Score: 3  Day of Surgery Review of History & Physical: H&P Update referred to the surgeon/provider.    Ready For Surgery From Anesthesia Perspective.     .

## 2024-01-23 NOTE — DISCHARGE INSTRUCTIONS
Procedure Date  1/23/24     Impression  Overall Impression:   Dilated in the esophagus  Erythematous mucosa in the fundus of the stomach and antrum  Small hiatal hernia  Dilated in the esophagus with Bejarano dilator  Mucosa of the esophagus is normal appearing. The esophagus was dilated with a 48 F Bejarano dilator. A small hiatus hernia was noted and gastritis was present. No biopsies were obtained due to Brillinta therapy. Mucosa of the duodenum was normal appearing.     Recommendation    Follow up with PCP      Avoid nsaids; resume PPI or H2RA; repeat esophageal dilation prn.  Discharge: disp: DC to home. Resume diet. No driving x 24 hours. Follow-up with PCP as scheduled.  Dx: esophageal dysphagia, hiatus hernia, gastritis, s/p dilation of the esophagus     Please call the GI Lab if you have any nausea, vomiting, or abdominal pain.  NO DRIVING, OPERATING EQUIPMENT, OR SIGNING LEGAL DOCUMENTS FOR 24 HOURS.  THE NURSE WILL CALL YOU WITH YOUR BIOPSY RESULTS IN A FEW DAYS.

## 2024-01-25 ENCOUNTER — OFFICE VISIT (OUTPATIENT)
Dept: PAIN MEDICINE | Facility: CLINIC | Age: 84
End: 2024-01-25
Payer: MEDICARE

## 2024-01-25 VITALS
DIASTOLIC BLOOD PRESSURE: 86 MMHG | SYSTOLIC BLOOD PRESSURE: 121 MMHG | BODY MASS INDEX: 21.79 KG/M2 | WEIGHT: 123 LBS | HEART RATE: 82 BPM | HEIGHT: 63 IN | RESPIRATION RATE: 16 BRPM

## 2024-01-25 DIAGNOSIS — M89.49 OSTEOARTHROSIS MULTIPLE SITES, NOT SPECIFIED AS GENERALIZED: Chronic | ICD-10-CM

## 2024-01-25 DIAGNOSIS — Z79.899 ENCOUNTER FOR LONG-TERM (CURRENT) USE OF OTHER MEDICATIONS: ICD-10-CM

## 2024-01-25 DIAGNOSIS — G89.29 CHRONIC PAIN OF RIGHT KNEE: ICD-10-CM

## 2024-01-25 DIAGNOSIS — M25.561 CHRONIC PAIN OF RIGHT KNEE: ICD-10-CM

## 2024-01-25 DIAGNOSIS — M54.16 LUMBAR RADICULOPATHY: Chronic | ICD-10-CM

## 2024-01-25 DIAGNOSIS — M47.812 SPONDYLOSIS OF CERVICAL JOINT WITHOUT MYELOPATHY: Primary | Chronic | ICD-10-CM

## 2024-01-25 DIAGNOSIS — M54.14 THORACIC RADICULOPATHY: Chronic | ICD-10-CM

## 2024-01-25 PROCEDURE — 1125F AMNT PAIN NOTED PAIN PRSNT: CPT | Mod: CPTII,,, | Performed by: PHYSICIAN ASSISTANT

## 2024-01-25 PROCEDURE — 3288F FALL RISK ASSESSMENT DOCD: CPT | Mod: CPTII,,, | Performed by: PHYSICIAN ASSISTANT

## 2024-01-25 PROCEDURE — 3074F SYST BP LT 130 MM HG: CPT | Mod: CPTII,,, | Performed by: PHYSICIAN ASSISTANT

## 2024-01-25 PROCEDURE — 1159F MED LIST DOCD IN RCRD: CPT | Mod: CPTII,,, | Performed by: PHYSICIAN ASSISTANT

## 2024-01-25 PROCEDURE — 1101F PT FALLS ASSESS-DOCD LE1/YR: CPT | Mod: CPTII,,, | Performed by: PHYSICIAN ASSISTANT

## 2024-01-25 PROCEDURE — 99215 OFFICE O/P EST HI 40 MIN: CPT | Mod: PBBFAC | Performed by: PHYSICIAN ASSISTANT

## 2024-01-25 PROCEDURE — 99999PBSHW POCT URINE DRUG SCREEN PRESUMP: Mod: PBBFAC,,,

## 2024-01-25 PROCEDURE — 80305 DRUG TEST PRSMV DIR OPT OBS: CPT | Mod: PBBFAC | Performed by: PHYSICIAN ASSISTANT

## 2024-01-25 PROCEDURE — 99214 OFFICE O/P EST MOD 30 MIN: CPT | Mod: S$PBB,,, | Performed by: PHYSICIAN ASSISTANT

## 2024-01-25 PROCEDURE — 3079F DIAST BP 80-89 MM HG: CPT | Mod: CPTII,,, | Performed by: PHYSICIAN ASSISTANT

## 2024-01-25 RX ORDER — HYDROCODONE BITARTRATE AND ACETAMINOPHEN 7.5; 325 MG/1; MG/1
1 TABLET ORAL EVERY 8 HOURS PRN
Qty: 90 TABLET | Refills: 0 | Status: SHIPPED | OUTPATIENT
Start: 2024-02-17 | End: 2024-04-15 | Stop reason: SDUPTHER

## 2024-01-25 RX ORDER — GABAPENTIN 100 MG/1
100 CAPSULE ORAL EVERY 8 HOURS
Qty: 270 CAPSULE | Refills: 2 | Status: SHIPPED | OUTPATIENT
Start: 2024-01-25

## 2024-01-25 RX ORDER — HYDROCODONE BITARTRATE AND ACETAMINOPHEN 7.5; 325 MG/1; MG/1
1 TABLET ORAL EVERY 8 HOURS PRN
Qty: 90 TABLET | Refills: 0 | Status: CANCELLED | OUTPATIENT
Start: 2024-01-25

## 2024-01-25 NOTE — PATIENT INSTRUCTIONS

## 2024-01-25 NOTE — ANESTHESIA POSTPROCEDURE EVALUATION
Anesthesia Post Evaluation    Patient: Sharee Elizondo    Procedure(s) Performed: * No procedures listed *    Final Anesthesia Type: general      Patient location during evaluation: PACU  Patient participation: Yes- Able to Participate  Level of consciousness: awake and alert, oriented and awake  Post-procedure vital signs: reviewed and stable  Pain management: adequate  Airway patency: patent    PONV status at discharge: No PONV  Anesthetic complications: no      Cardiovascular status: blood pressure returned to baseline, hemodynamically stable and stable  Respiratory status: unassisted and spontaneous ventilation  Hydration status: euvolemic  Follow-up not needed.              Vitals Value Taken Time   /86 01/25/24 0806   Temp 97 01/25/24 1304   Pulse 82 01/25/24 0806   Resp 16 01/25/24 0806   SpO2 97 % 01/23/24 1129   Vitals shown include unvalidated device data.      Event Time   Out of Recovery 11:30:27         Pain/Christiano Score: No data recorded

## 2024-01-29 DIAGNOSIS — M47.812 SPONDYLOSIS OF CERVICAL JOINT WITHOUT MYELOPATHY: Chronic | ICD-10-CM

## 2024-01-29 DIAGNOSIS — M54.16 LUMBAR RADICULOPATHY: Chronic | ICD-10-CM

## 2024-01-29 DIAGNOSIS — M54.14 THORACIC RADICULOPATHY: Chronic | ICD-10-CM

## 2024-01-29 DIAGNOSIS — M89.49 OSTEOARTHROSIS MULTIPLE SITES, NOT SPECIFIED AS GENERALIZED: Chronic | ICD-10-CM

## 2024-01-29 NOTE — TELEPHONE ENCOUNTER
MAISHA SANTORO   01/29/2024   3:41 PM   Per Dr. Garcia can not hold aspirin, Patient notified will send in 2 refills on pain meds and cancel procedure

## 2024-01-30 RX ORDER — HYDROCODONE BITARTRATE AND ACETAMINOPHEN 7.5; 325 MG/1; MG/1
1 TABLET ORAL EVERY 8 HOURS PRN
Qty: 90 TABLET | Refills: 0 | Status: SHIPPED | OUTPATIENT
Start: 2024-04-17 | End: 2024-04-15 | Stop reason: SDUPTHER

## 2024-01-30 RX ORDER — HYDROCODONE BITARTRATE AND ACETAMINOPHEN 7.5; 325 MG/1; MG/1
1 TABLET ORAL EVERY 8 HOURS PRN
Qty: 90 TABLET | Refills: 0 | Status: SHIPPED | OUTPATIENT
Start: 2024-03-18 | End: 2024-04-15 | Stop reason: SDUPTHER

## 2024-04-09 DIAGNOSIS — Z71.89 COMPLEX CARE COORDINATION: ICD-10-CM

## 2024-04-15 NOTE — PROGRESS NOTES
Subjective:         Patient ID: Sharee Elizondo is a 84 y.o. female.    Chief Complaint: Neck Pain        Pain  This is a chronic problem. The current episode started more than 1 year ago. The problem occurs daily. The problem has been unchanged. Associated symptoms include arthralgias and neck pain. Pertinent negatives include no anorexia, chest pain, coughing, diaphoresis, fever, sore throat, urinary symptoms, vertigo or vomiting.     Review of Systems   Constitutional:  Negative for activity change, appetite change, diaphoresis, fever and unexpected weight change.   HENT:  Negative for drooling, ear discharge, ear pain, facial swelling, mouth dryness, nosebleeds, sore throat, trouble swallowing, voice change and goiter.    Eyes:  Negative for photophobia, pain, discharge, redness and visual disturbance.   Respiratory:  Negative for apnea, cough, choking, chest tightness, shortness of breath, wheezing and stridor.    Cardiovascular:  Negative for chest pain, palpitations and leg swelling.   Gastrointestinal:  Negative for abdominal distention, anorexia, diarrhea, rectal pain, vomiting and fecal incontinence.   Endocrine: Negative for cold intolerance, heat intolerance, polydipsia, polyphagia and polyuria.   Genitourinary:  Negative for bladder incontinence, dysuria, flank pain, frequency and hot flashes.   Musculoskeletal:  Positive for arthralgias, back pain, leg pain and neck pain.   Integumentary:  Negative for color change and pallor.   Allergic/Immunologic: Negative for immunocompromised state.   Neurological:  Negative for dizziness, vertigo, seizures, syncope, facial asymmetry, speech difficulty, light-headedness, memory loss and coordination difficulties.   Hematological:  Negative for adenopathy. Does not bruise/bleed easily.   Psychiatric/Behavioral:  Negative for agitation, behavioral problems, confusion, decreased concentration, dysphoric mood, hallucinations, self-injury and suicidal ideas. The patient is  not nervous/anxious and is not hyperactive.            Past Medical History:   Diagnosis Date    Acute superficial gastritis without hemorrhage 02/18/2022    Atrial fibrillation     Automatic implantable cardiac defibrillator in situ     Bilateral primary osteoarthritis of knee     Cervical spondylosis without myelopathy     CHF (congestive heart failure)     Chronic ischemic heart disease     Chronic kidney disease (CKD), stage III (moderate)     Chronic pain syndrome     COPD (chronic obstructive pulmonary disease)     Coronary arteriosclerosis     Depressive disorder     Esophageal dysphagia 02/18/2022    GERD (gastroesophageal reflux disease)     Heart attack     may 2023    HH (hiatus hernia) 02/18/2022    Hypothyroidism     Low back pain     Lumbar radiculopathy     Melanoma 2019    scalp    Metabolic syndrome     Myocardial ischemia     Other long term (current) drug therapy     Radiculopathy, cervical region     Type 2 diabetes mellitus      Past Surgical History:   Procedure Laterality Date    BACK SURGERY      bilateral knee injection/ aspiration of joint/ bursa-large  Bilateral 4/4/2017 3/27/2017 3/20/2017    bilateral knee orthovisc injection    CARDIAC CATHETERIZATION      CARDIAC SURGERY      EPIDURAL STEROID INJECTION INTO CERVICAL SPINE  6/19/2019 5/17/2017 4/26/2017    C6-7 LONNIEDr Farah    EPIDURAL STEROID INJECTION INTO LUMBAR SPINE  03/15/2018    L4-5 LONNIEDr Farah    HYSTERECTOMY      INJECTION OF ANESTHETIC AGENT AROUND MEDIAL BRANCH NERVES INNERVATING LUMBAR FACET JOINT Bilateral 04/14/2022    Procedure: Block-nerve-medial branch-lumbar, bilateral L4 through S1;  Surgeon: Angie Farah MD;  Location: [facility];  Service: Pain Management;  Laterality: Bilateral;  pt aware at visit to be tested  4/12 patient getting covid test today    INJECTION OF FACET JOINT Bilateral 8/17/2017 7/19/2017    Bilateral [id number] Dr Gagan MARROQUIN    INJECTION OF FACET JOINT Left 10/18/2018    left C3-7 FI,  "Dr Farah    INJECTION OF FACET JOINT Bilateral 8/22/2018 6/29/2018    bilateral L3-S1 FI, Dr Farah    RADIOFREQUENCY ABLATION Right 12/12/2018    Right C3-7 RF, Dr Farah    RADIOFREQUENCY ABLATION Left 01/9/2019 10/16/2017 10/7/2020    Left C3-7 RF, Dr Farah    RADIOFREQUENCY ABLATION Left 1/9/2019 10/16/2017    left C3-7 RF, Dr Farah    RADIOFREQUENCY THERMAL COAGULATION OF MEDIAL BRANCH OF POSTERIOR RAMUS OF CERVICAL SPINAL NERVE Left 08/05/2021    Procedure: RADIOFREQUENCY THERMAL COAGULATION, NERVE, SPINAL, CERVICAL, POSTERIOR RAMUS, MEDIAL BRANCH, Left C3-4,4-5  only 2 levels resubmitted dt denied all levels on 8-3-2021;  Surgeon: Angie Farah MD;  Location: [facility];  Service: Pain Management;  Laterality: Left;    RADIOFREQUENCY THERMAL COAGULATION OF MEDIAL BRANCH OF POSTERIOR RAMUS OF CERVICAL SPINAL NERVE Right 08/24/2021    Procedure: RADIOFREQUENCY THERMAL COAGULATION, NERVE, SPINAL, CERVICAL, POSTERIOR RAMUS, MEDIAL BRANCH RIGHT C3-C5 RFTC;  Surgeon: Angie Farah MD;  Location: [facility];  Service: Pain Management;  Laterality: Right;  HAD VAC WILL BRING CARD    TRANSFORAMINAL EPIDURAL INJECTION OF STEROID Right 5/30/2018 5/2/2018    Right L4-5 TFESI, Dr Farah     Social History     Socioeconomic History    Marital status:    Tobacco Use    Smoking status: Every Day     Current packs/day: 0.50     Average packs/day: 0.5 packs/day for 40.0 years (20.0 ttl pk-yrs)     Types: Cigarettes     Passive exposure: Current    Smokeless tobacco: Never   Substance and Sexual Activity    Alcohol use: Not Currently    Drug use: Never     Family History   Problem Relation Name Age of Onset    Hypertension Mother      Diabetes Mellitus Mother       Review of patient's allergies indicates:  No Known Allergies     Objective:  Vitals:    04/22/24 1018   BP: (!) 111/56   Pulse: 70   Resp: 20   Weight: 53.1 kg (117 lb)   Height: 5' 3" (1.6 m)   PainSc:   8             Physical " Exam  Vitals and nursing note reviewed. Exam conducted with a chaperone present.   Constitutional:       General: She is awake. She is not in acute distress.     Appearance: Normal appearance. She is not ill-appearing or toxic-appearing.   HENT:      Head: Normocephalic and atraumatic.      Nose: Nose normal.      Mouth/Throat:      Mouth: Mucous membranes are moist.      Pharynx: Oropharynx is clear.   Eyes:      Conjunctiva/sclera: Conjunctivae normal.      Pupils: Pupils are equal, round, and reactive to light.   Pulmonary:      Effort: Pulmonary effort is normal. No respiratory distress.   Abdominal:      Palpations: Abdomen is soft.      Tenderness: There is no guarding.   Musculoskeletal:         General: Normal range of motion.      Cervical back: Normal range of motion and neck supple. Tenderness present. No rigidity.      Thoracic back: Tenderness present.      Lumbar back: Tenderness present.   Skin:     General: Skin is warm and dry.      Coloration: Skin is not jaundiced or pale.   Neurological:      General: No focal deficit present.      Mental Status: She is alert and oriented to person, place, and time. Mental status is at baseline.      Cranial Nerves: No cranial nerve deficit (II-XII).      Gait: Gait abnormal.   Psychiatric:         Mood and Affect: Mood normal.         Behavior: Behavior normal. Behavior is cooperative.         Thought Content: Thought content normal.           EGD w Dilation  Narrative: Table formatting from the original result was not included.  Procedure Date  1/23/24    Impression  Overall   Impression:    Dilated in the esophagus  Erythematous mucosa in the fundus of the stomach and antrum  Small hiatal hernia  Dilated in the esophagus with Bejarano dilator  Mucosa of the esophagus is normal appearing. The esophagus was dilated   with a 48 F Bejarano dilator. A small hiatus hernia was noted and gastritis   was present. No biopsies were obtained due to Brillinta therapy. Mucosa  of   the duodenum was normal appearing.    Recommendation   Follow up with PCP     Avoid nsaids; resume PPI or H2RA; repeat esophageal dilation prn.  Discharge: disp: DC to home. Resume diet. No driving x 24 hours. Follow-up   with PCP as scheduled.  Dx: esophageal dysphagia, hiatus hernia, gastritis, s/p dilation of the   esophagus    Indication  Dysphagia    Providers  Bret Cevallos, RN Registered Nurse   Kelsey Pereira Technician   Gage, JAE Lyons CRNA, Vincent C., MD Proceduralist     Medications  Moderate sedation administered by anesthesia staff - See anesthesia   record.    Preprocedure  A history and physical has been performed, and patient medication   allergies have been reviewed. The patient's tolerance of previous   anesthesia has been reviewed. The risks and benefits of the procedure and   the sedation options and risks were discussed with the patient. All   questions were answered and informed consent obtained.    ASA Score: ASA 3 - Patient with moderate systemic disease with functional   limitations  Mallampati Airway Score: II (hard and soft palate, upper portion of   tonsils anduvula visible)    Details of the Procedure  The patient underwent monitored anesthesia care, which was administered by   an anesthesia professional. The patient's blood pressure, heart rate,   level of consciousness, oxygen, respirations, ECG and ETCO2 were monitored   throughout the procedure. The scope was introduced through the mouth and   advanced to the third part of the duodenum. Retroflexion was performed in   the cardia. Prior to the procedure, the patient's H. Pylori status was   unknown. The patient's estimated blood loss was minimal (<5 mL). The   procedure was not difficult. The patient tolerated the procedure well.   There were no apparent adverse events.     Scope: Gastroscope  Scope Serial: [id number]    Events  Procedure Events   Event Event Time     Procedure Events   Event Event Time    SCOPE IN 1/23/2024 10:49 AM   SCOPE OUT 1/23/2024 10:51 AM     Findings  Dilated in the esophagus from 48 mm starting size  Erythematous mucosa in the fundus of the stomach and antrum  Small hiatal hernia  Dilated in the esophagus with Bejarano dilator         Office Visit on 01/25/2024   Component Date Value Ref Range Status    POC Amphetamines 01/25/2024 Negative  Negative, Inconclusive Final    POC Barbiturates 01/25/2024 Negative  Negative, Inconclusive Final    POC Benzodiazepines 01/25/2024 Negative  Negative, Inconclusive Final    POC Cocaine 01/25/2024 Negative  Negative, Inconclusive Final    POC THC 01/25/2024 Negative  Negative, Inconclusive Final    POC Methadone 01/25/2024 Negative  Negative, Inconclusive Final    POC Methamphetamine 01/25/2024 Negative  Negative, Inconclusive Final    POC Opiates 01/25/2024 Presumptive Positive (A)  Negative, Inconclusive Final    POC Oxycodone 01/25/2024 Negative  Negative, Inconclusive Final    POC Phencyclidine 01/25/2024 Negative  Negative, Inconclusive Final    POC Methylenedioxymethamphetamine * 01/25/2024 Negative  Negative, Inconclusive Final    POC Tricyclic Antidepressants 01/25/2024 Negative  Negative, Inconclusive Final    POC Buprenorphine 01/25/2024 Negative   Final     Acceptable 01/25/2024 Yes   Final    POC Temperature (Urine) 01/25/2024 92   Final   Hospital Outpatient Visit on 01/23/2024   Component Date Value Ref Range Status    POC Glucose 01/23/2024 218 (A)  70 - 110 MG/DL Final         Orders Placed This Encounter   Procedures    POCT Urine Drug Screen Presump     Interpretive Information:     Negative:  No drug detected at the cut off level.   Positive:  This result represents presumptive positive for the   tested drug, other substances may yield a positive response other   than the analyte of interest. This result should be utilized for   diagnostic purpose only. Confirmation testing will be performed upon physician request  only.            Requested Prescriptions     Signed Prescriptions Disp Refills    HYDROcodone-acetaminophen (NORCO) 7.5-325 mg per tablet 90 tablet 0     Sig: Take 1 tablet by mouth every 8 (eight) hours as needed for Pain.    HYDROcodone-acetaminophen (NORCO) 7.5-325 mg per tablet 90 tablet 0     Sig: Take 1 tablet by mouth every 8 (eight) hours as needed for Pain.    HYDROcodone-acetaminophen (NORCO) 7.5-325 mg per tablet 90 tablet 0     Sig: Take 1 tablet by mouth every 8 (eight) hours as needed for Pain.       Assessment:     1. 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 Osteoarthrosis multiple sites, not specified as generalized    3. Chronic pain of right knee    4. Lumbar radiculopathy    5. Thoracic radiculopathy    6. Encounter for long-term (current) use of other medications             A's of Opioid Risk Assessment  Activity:Patient can perform ADL.   Analgesia:Patients pain is partially controlled by current medication. Patient has tried OTC medications such as Tylenol and Ibuprofen with out relief.   Adverse Effects: Patient denies constipation or sedation.  Aberrant Behavior:  reviewed with no aberrant drug seeking/taking behavior.  Overdose reversal drug naloxone discussed    Drug screen reviewed          X-ray bilateral knee [facility] September 4, 2020 degenerative changes noted no fracture noted    CT thoracic spine degenerative changes throughout the thoracic spine radiologist did find a noncalcified 5-6 mm lesion left lower lung area recommended follow-up with CT chest 6 months   Primary care provider following this    CT cervical spine [facility] April 3, 2023, multiple level degenerative changes no high-grade spinal canal narrowing neuroforaminal narrowing greatest left C5/6 severe mild-to-moderate anterior wedging C5 vertebral body             Plan:    Pharmacy closed on weekends         Narcan November 2022    History lumbar surgery 2005, Dr. Chuck Anthony  [facility]    August 2023 Cardiology Dr. Garcia can not hold aspirin anticoagulant after cardiac stent placement January 2024      Pharmacy closed on weekends       Cardiac defibrillator cannot have MRI    Anticoagulated cardiac stent placement    Canceled cervical RF TC August 2023 can not hold anticoagulant    She verbalized understanding due to can not hold aspirin can not have procedure this time    She would like to continue with conservative management current medication    Continue home exercise program as directed    Continue current medication    Follow-up 3 months    Dr. Farah November 2023    Bring original prescription medication bottles/container/box with labels to each visit

## 2024-04-22 ENCOUNTER — OFFICE VISIT (OUTPATIENT)
Dept: PAIN MEDICINE | Facility: CLINIC | Age: 84
End: 2024-04-22
Payer: MEDICARE

## 2024-04-22 VITALS
BODY MASS INDEX: 20.73 KG/M2 | WEIGHT: 117 LBS | RESPIRATION RATE: 20 BRPM | SYSTOLIC BLOOD PRESSURE: 111 MMHG | HEART RATE: 70 BPM | DIASTOLIC BLOOD PRESSURE: 56 MMHG | HEIGHT: 63 IN

## 2024-04-22 DIAGNOSIS — M54.16 LUMBAR RADICULOPATHY: Chronic | ICD-10-CM

## 2024-04-22 DIAGNOSIS — M89.49 OSTEOARTHROSIS MULTIPLE SITES, NOT SPECIFIED AS GENERALIZED: Chronic | ICD-10-CM

## 2024-04-22 DIAGNOSIS — Z79.899 ENCOUNTER FOR LONG-TERM (CURRENT) USE OF OTHER MEDICATIONS: ICD-10-CM

## 2024-04-22 DIAGNOSIS — M25.561 CHRONIC PAIN OF RIGHT KNEE: ICD-10-CM

## 2024-04-22 DIAGNOSIS — M47.812 SPONDYLOSIS OF CERVICAL JOINT WITHOUT MYELOPATHY: Primary | Chronic | ICD-10-CM

## 2024-04-22 DIAGNOSIS — G89.29 CHRONIC PAIN OF RIGHT KNEE: ICD-10-CM

## 2024-04-22 DIAGNOSIS — M54.14 THORACIC RADICULOPATHY: Chronic | ICD-10-CM

## 2024-04-22 PROCEDURE — 1101F PT FALLS ASSESS-DOCD LE1/YR: CPT | Mod: CPTII,,, | Performed by: PHYSICIAN ASSISTANT

## 2024-04-22 PROCEDURE — 3288F FALL RISK ASSESSMENT DOCD: CPT | Mod: CPTII,,, | Performed by: PHYSICIAN ASSISTANT

## 2024-04-22 PROCEDURE — 99999PBSHW POCT URINE DRUG SCREEN PRESUMP: Mod: PBBFAC,,,

## 2024-04-22 PROCEDURE — 99214 OFFICE O/P EST MOD 30 MIN: CPT | Mod: S$PBB,,, | Performed by: PHYSICIAN ASSISTANT

## 2024-04-22 PROCEDURE — 1159F MED LIST DOCD IN RCRD: CPT | Mod: CPTII,,, | Performed by: PHYSICIAN ASSISTANT

## 2024-04-22 PROCEDURE — 99215 OFFICE O/P EST HI 40 MIN: CPT | Mod: PBBFAC | Performed by: PHYSICIAN ASSISTANT

## 2024-04-22 PROCEDURE — 80305 DRUG TEST PRSMV DIR OPT OBS: CPT | Mod: PBBFAC | Performed by: PHYSICIAN ASSISTANT

## 2024-04-22 PROCEDURE — 3074F SYST BP LT 130 MM HG: CPT | Mod: CPTII,,, | Performed by: PHYSICIAN ASSISTANT

## 2024-04-22 PROCEDURE — 1125F AMNT PAIN NOTED PAIN PRSNT: CPT | Mod: CPTII,,, | Performed by: PHYSICIAN ASSISTANT

## 2024-04-22 PROCEDURE — 3078F DIAST BP <80 MM HG: CPT | Mod: CPTII,,, | Performed by: PHYSICIAN ASSISTANT

## 2024-04-22 RX ORDER — HYDROCODONE BITARTRATE AND ACETAMINOPHEN 7.5; 325 MG/1; MG/1
1 TABLET ORAL EVERY 8 HOURS PRN
Qty: 90 TABLET | Refills: 0 | Status: SHIPPED | OUTPATIENT
Start: 2024-06-21

## 2024-04-22 RX ORDER — HYDROCODONE BITARTRATE AND ACETAMINOPHEN 7.5; 325 MG/1; MG/1
1 TABLET ORAL EVERY 8 HOURS PRN
Qty: 90 TABLET | Refills: 0 | Status: SHIPPED | OUTPATIENT
Start: 2024-04-22

## 2024-04-22 RX ORDER — GABAPENTIN 100 MG/1
100 CAPSULE ORAL EVERY 8 HOURS
Qty: 270 CAPSULE | Refills: 2 | Status: CANCELLED | OUTPATIENT
Start: 2024-04-22

## 2024-04-22 RX ORDER — HYDROCODONE BITARTRATE AND ACETAMINOPHEN 7.5; 325 MG/1; MG/1
1 TABLET ORAL EVERY 8 HOURS PRN
Qty: 90 TABLET | Refills: 0 | Status: SHIPPED | OUTPATIENT
Start: 2024-05-22

## 2025-01-02 DIAGNOSIS — M54.16 LUMBAR RADICULOPATHY: Chronic | ICD-10-CM

## 2025-01-02 DIAGNOSIS — M25.561 CHRONIC PAIN OF RIGHT KNEE: ICD-10-CM

## 2025-01-02 DIAGNOSIS — M89.49 OSTEOARTHROSIS MULTIPLE SITES, NOT SPECIFIED AS GENERALIZED: Chronic | ICD-10-CM

## 2025-01-02 DIAGNOSIS — G89.29 CHRONIC PAIN OF RIGHT KNEE: ICD-10-CM

## 2025-01-02 DIAGNOSIS — M47.812 SPONDYLOSIS OF CERVICAL JOINT WITHOUT MYELOPATHY: Chronic | ICD-10-CM

## 2025-01-03 RX ORDER — GABAPENTIN 100 MG/1
CAPSULE ORAL
Qty: 270 CAPSULE | Refills: 2 | Status: SHIPPED | OUTPATIENT
Start: 2025-01-03

## (undated) DEVICE — SET IV SOL CONTIN-FLOW 10 DROP/ML (PRIMARY)

## (undated) DEVICE — TRAY NERVE BLOCK (KC) PMA

## (undated) DEVICE — CATH IV JELCO 22GX1 IN

## (undated) DEVICE — DRAPE SURGICAL 3/4 SHEET 52IN X 76IN STERILE

## (undated) DEVICE — KIT IV START 849

## (undated) DEVICE — TRAY NERVE BLOCK UNIV 10/CA

## (undated) DEVICE — SOL WATER STRL IRRIGATION 250ML

## (undated) DEVICE — KIT IV START RUSH

## (undated) DEVICE — GLOVE SURGICAL PROTEXIS PI SIZE 6.5

## (undated) DEVICE — NDL SPINAL 22GA 3.5 IN QUINCKE

## (undated) DEVICE — APPLICATOR CHLORAPREP HI-LITE TINTED ORANGE 26ML

## (undated) DEVICE — TOWEL SURGICAL BLUE COTTON 16INX26IN STERILE 4/PK

## (undated) DEVICE — NEEDLE SPINAL 22GX3.5 QUINCHE (KC)

## (undated) DEVICE — CATH IV 22G X 1 AUTOGUARD

## (undated) DEVICE — SOL CONTINU-FLO SET 2 LAV

## (undated) DEVICE — APPLICATOR CHLORAPREP ORN 26ML

## (undated) DEVICE — CANNULA STRYKER VENOM 20G, 100MM, 10MM (SHORT)

## (undated) DEVICE — GLOVE PROTEXIS PI SYN SURG 6.5

## (undated) DEVICE — TOWEL OR DISP STRL BLUE 4/PK